# Patient Record
Sex: FEMALE | Race: WHITE | NOT HISPANIC OR LATINO | Employment: OTHER | URBAN - METROPOLITAN AREA
[De-identification: names, ages, dates, MRNs, and addresses within clinical notes are randomized per-mention and may not be internally consistent; named-entity substitution may affect disease eponyms.]

---

## 2023-08-20 PROBLEM — R26.9 ABNORMALITY OF GAIT: Status: ACTIVE | Noted: 2023-08-20

## 2023-08-20 PROBLEM — K59.00 CONSTIPATION: Status: ACTIVE | Noted: 2023-08-20

## 2023-08-20 PROBLEM — R29.898 LEG WEAKNESS: Status: ACTIVE | Noted: 2023-08-20

## 2023-08-20 PROBLEM — E78.5 HLD (HYPERLIPIDEMIA): Status: ACTIVE | Noted: 2023-08-20

## 2023-08-20 PROBLEM — I10 HTN (HYPERTENSION): Status: ACTIVE | Noted: 2023-08-20

## 2023-08-20 PROBLEM — S32.010A COMPRESSION FRACTURE OF L1 VERTEBRA (MULTI): Status: ACTIVE | Noted: 2023-08-20

## 2023-08-20 PROBLEM — M54.50 LOWER BACK PAIN: Status: ACTIVE | Noted: 2023-08-20

## 2023-08-20 PROBLEM — M25.69 BACK STIFFNESS: Status: ACTIVE | Noted: 2023-08-20

## 2023-08-20 PROBLEM — G20.A1 PARKINSON DISEASE (MULTI): Status: ACTIVE | Noted: 2023-08-20

## 2023-08-20 PROBLEM — R00.2 PALPITATIONS: Status: ACTIVE | Noted: 2023-08-20

## 2023-08-20 RX ORDER — METHOCARBAMOL 500 MG/1
TABLET, FILM COATED ORAL
COMMUNITY
Start: 2022-08-26

## 2023-08-20 RX ORDER — ATORVASTATIN CALCIUM 20 MG/1
1 TABLET, FILM COATED ORAL NIGHTLY
COMMUNITY

## 2023-08-20 RX ORDER — VENLAFAXINE HYDROCHLORIDE 150 MG/1
CAPSULE, EXTENDED RELEASE ORAL
COMMUNITY

## 2023-08-20 RX ORDER — CARBIDOPA AND LEVODOPA 25; 100 MG/1; MG/1
TABLET ORAL
COMMUNITY
Start: 2017-08-08 | End: 2023-11-10 | Stop reason: SDUPTHER

## 2023-08-20 RX ORDER — VENLAFAXINE HYDROCHLORIDE 75 MG/1
75 CAPSULE, EXTENDED RELEASE ORAL
COMMUNITY
Start: 2021-11-02

## 2023-08-20 RX ORDER — LORAZEPAM 0.5 MG
1 TABLET ORAL DAILY
COMMUNITY
Start: 2021-07-27

## 2023-08-20 RX ORDER — AMLODIPINE BESYLATE 5 MG/1
1 TABLET ORAL DAILY
COMMUNITY

## 2023-08-20 RX ORDER — OXYBUTYNIN CHLORIDE 10 MG/1
1 TABLET, EXTENDED RELEASE ORAL DAILY
COMMUNITY
Start: 2021-04-27

## 2023-08-20 RX ORDER — POLYETHYLENE GLYCOL 3350 17 G/17G
POWDER, FOR SOLUTION ORAL
COMMUNITY

## 2023-08-20 RX ORDER — AMANTADINE HYDROCHLORIDE 100 MG/1
100 TABLET ORAL
COMMUNITY
Start: 2017-12-12 | End: 2024-01-10 | Stop reason: SDUPTHER

## 2023-08-20 RX ORDER — CARBIDOPA AND LEVODOPA 50; 200 MG/1; MG/1
TABLET, EXTENDED RELEASE ORAL
COMMUNITY
Start: 2022-08-03 | End: 2024-01-10 | Stop reason: SDUPTHER

## 2023-08-20 RX ORDER — HYDROCHLOROTHIAZIDE 25 MG/1
TABLET ORAL
COMMUNITY
Start: 2021-11-02

## 2023-08-20 RX ORDER — ACETAMINOPHEN, DEXTROMETHORPHAN HBR, DOXYLAMINE SUCCINATE, PHENYLEPHRINE HCL 650; 20; 12.5; 1 MG/30ML; MG/30ML; MG/30ML; MG/30ML
1 SOLUTION ORAL DAILY
COMMUNITY
Start: 2021-04-27

## 2023-10-02 ENCOUNTER — APPOINTMENT (OUTPATIENT)
Dept: NEUROLOGY | Facility: CLINIC | Age: 78
End: 2023-10-02
Payer: MEDICARE

## 2023-10-02 ENCOUNTER — TELEPHONE (OUTPATIENT)
Dept: NEUROLOGY | Facility: CLINIC | Age: 78
End: 2023-10-02
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
## 2023-10-23 ENCOUNTER — OFFICE VISIT (OUTPATIENT)
Dept: NEUROLOGY | Facility: CLINIC | Age: 78
End: 2023-10-23
Payer: MEDICARE

## 2023-10-23 VITALS
HEIGHT: 67 IN | DIASTOLIC BLOOD PRESSURE: 66 MMHG | HEART RATE: 61 BPM | WEIGHT: 180 LBS | SYSTOLIC BLOOD PRESSURE: 135 MMHG | BODY MASS INDEX: 28.25 KG/M2

## 2023-10-23 DIAGNOSIS — R26.9 ABNORMALITY OF GAIT: ICD-10-CM

## 2023-10-23 DIAGNOSIS — G20.A1 PARKINSON'S DISEASE WITHOUT DYSKINESIA OR FLUCTUATING MANIFESTATIONS (MULTI): Primary | ICD-10-CM

## 2023-10-23 DIAGNOSIS — F41.9 ANXIETY: ICD-10-CM

## 2023-10-23 DIAGNOSIS — R41.89 COGNITIVE IMPAIRMENT: ICD-10-CM

## 2023-10-23 PROCEDURE — 3075F SYST BP GE 130 - 139MM HG: CPT | Performed by: PSYCHIATRY & NEUROLOGY

## 2023-10-23 PROCEDURE — 1159F MED LIST DOCD IN RCRD: CPT | Performed by: PSYCHIATRY & NEUROLOGY

## 2023-10-23 PROCEDURE — 3078F DIAST BP <80 MM HG: CPT | Performed by: PSYCHIATRY & NEUROLOGY

## 2023-10-23 PROCEDURE — 99214 OFFICE O/P EST MOD 30 MIN: CPT | Performed by: PSYCHIATRY & NEUROLOGY

## 2023-10-23 PROCEDURE — 1160F RVW MEDS BY RX/DR IN RCRD: CPT | Performed by: PSYCHIATRY & NEUROLOGY

## 2023-10-23 PROCEDURE — 1036F TOBACCO NON-USER: CPT | Performed by: PSYCHIATRY & NEUROLOGY

## 2023-10-23 ASSESSMENT — UNIFIED PARKINSONS DISEASE RATING SCALE (UPDRS)
CHAIR_RISING_SCALE: 2
MINUTES_SINCE_LEVODOPA: 90 MINS
AMPLITUDE_LIP_JAW: 0
CONSTANCY_TREMOR_ATREST: 1
AMPLITUDE_RUE: 1
RIGIDITY_LUE: 0
LEG_AGILITY_RIGHT: 0
KINETIC_TREMOR_LEFTHAND: 0
LEVODOPA: YES
PRONATION_SUPINATION_RIGHT: 0
PARKINSONS_MEDS: YES
TOETAPPING_LEFT: 3
KINETIC_TREMOR_RIGHTHAND: 0
FINGER_TAPPING_LEFT: 2
POSTURAL_TREMOR_LEFTHAND: 0
PRONATION_SUPINATION_LEFT: 1
CLINICAL_STATE: ON
RIGIDITY_RUE: 1
RIGIDITY_NECK: 1
RIGIDITY_RLE: 0
HANDMOVEMENTS_RIGHT: 1
FREEZING_GAIT: 2
POSTURE: 2
AMPLITUDE_LLE: 1
RIGIDITY_LLE: 0
SPONTANEITY_OF_MOVEMENT: 1
POSTURAL_TREMOR_RIGHTHAND: 0
LEG_AGILITY_LEFT: 0
AMPLITUDE_RLE: 0
AMPLITUDE_LUE: 0
SPEECH: 2
POSTURAL_STABILITY: 0
FINGER_TAPPING_RIGHT: 0
DYSKINESIAS_PRESENT: NO
TOETAPPING_RIGHT: 0
FACIAL_EXPRESSION: 1

## 2023-10-23 ASSESSMENT — PATIENT HEALTH QUESTIONNAIRE - PHQ9
SUM OF ALL RESPONSES TO PHQ9 QUESTIONS 1 AND 2: 0
2. FEELING DOWN, DEPRESSED OR HOPELESS: NOT AT ALL
1. LITTLE INTEREST OR PLEASURE IN DOING THINGS: NOT AT ALL

## 2023-10-23 ASSESSMENT — ENCOUNTER SYMPTOMS
DEPRESSION: 0
LOSS OF SENSATION IN FEET: 0
OCCASIONAL FEELINGS OF UNSTEADINESS: 0

## 2023-10-23 NOTE — PATIENT INSTRUCTIONS
It was nice to see you today     Carbidopa/levodopa 25/100 two tabs four times a day  Carbidopa/levodopa 50/200 CR at bedtime  Amantadine 100 mg twice a day  Neuropsychology testing  Physical therapy for gait and balance  RTC in 6 months.    [de-identified] : Musculoskeletal:. Left knee exam shows no effusion, ROM is 0-1 20 degrees, no instability, there is a well-healed midline surgical incision without evidence of erythema drainage or discharge, there is no tenderness palpation of the proximal fibula there is full range of motion of the ankle without pain, nontender palpation over the medial lateral malleoli, no edema or ecchymosis, foot warm well perfused brisk cap refill\par 5/5 motor strength in bilateral lower extremities. Sensory: Intact in bilateral lower extremities. DTRs: Biceps, brachioradialis, triceps, patellar, ankle and plantar 2+ and symmetric bilaterally. Pulses: dorsalis pedis, posterior tibial, femoral, popliteal, and radial 2+ and symmetric bilaterally. \par Constitutional: Alert and in no acute distress, but well-appearing.  [de-identified] : 2 views of the left tib-fib obtained the office today show no acute fracture or dislocation.  There is evidence of healed left proximal fibular fracture with abundant callus formation increase from prior x-ray.\par \par 3 views left ankle obtained the office today show no acute fracture or dislocation.
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
## 2023-11-10 DIAGNOSIS — G20.A1 PARKINSON'S DISEASE WITHOUT DYSKINESIA OR FLUCTUATING MANIFESTATIONS (MULTI): Primary | ICD-10-CM

## 2023-11-10 RX ORDER — CARBIDOPA AND LEVODOPA 25; 100 MG/1; MG/1
2 TABLET ORAL 4 TIMES DAILY
Qty: 720 TABLET | Refills: 3 | Status: SHIPPED | OUTPATIENT
Start: 2023-11-10 | End: 2024-01-10 | Stop reason: SDUPTHER

## 2024-01-10 DIAGNOSIS — G20.A1 PARKINSON'S DISEASE, UNSPECIFIED WHETHER DYSKINESIA PRESENT, UNSPECIFIED WHETHER MANIFESTATIONS FLUCTUATE (MULTI): ICD-10-CM

## 2024-01-10 DIAGNOSIS — G20.A1 PARKINSON'S DISEASE WITHOUT DYSKINESIA OR FLUCTUATING MANIFESTATIONS (MULTI): ICD-10-CM

## 2024-01-11 RX ORDER — AMANTADINE HYDROCHLORIDE 100 MG/1
100 TABLET ORAL 2 TIMES DAILY
Qty: 180 TABLET | Refills: 3 | Status: SHIPPED | OUTPATIENT
Start: 2024-01-11 | End: 2025-01-10

## 2024-01-11 RX ORDER — CARBIDOPA AND LEVODOPA 25; 100 MG/1; MG/1
2 TABLET ORAL 4 TIMES DAILY
Qty: 720 TABLET | Refills: 3 | Status: SHIPPED | OUTPATIENT
Start: 2024-01-11 | End: 2025-01-10

## 2024-01-11 RX ORDER — CARBIDOPA AND LEVODOPA 50; 200 MG/1; MG/1
1 TABLET, EXTENDED RELEASE ORAL NIGHTLY
Qty: 90 TABLET | Refills: 3 | Status: SHIPPED | OUTPATIENT
Start: 2024-01-11 | End: 2025-01-10

## 2024-02-13 ENCOUNTER — OFFICE VISIT (OUTPATIENT)
Dept: PSYCHOLOGY | Facility: CLINIC | Age: 79
End: 2024-02-13
Payer: MEDICARE

## 2024-02-13 DIAGNOSIS — G20.A1 PARKINSON'S DISEASE WITHOUT DYSKINESIA OR FLUCTUATING MANIFESTATIONS (MULTI): ICD-10-CM

## 2024-02-13 DIAGNOSIS — R41.89 COGNITIVE IMPAIRMENT: ICD-10-CM

## 2024-02-13 PROCEDURE — 99211NT NEUROPYSCH TESTING PENDING FINAL BILLING: Performed by: PSYCHOLOGIST

## 2024-02-13 NOTE — PROGRESS NOTES
Neuropsychology Evaluation    Name: Jennifer Murrell  : 1945  MRN: [id number]  Referring Provider: Sandi Coppola MD    Date of Service: 2024      Reason for Visit: Ms. Murrell was referred for neuropsychological evaluation due to memory difficulty and cognitive complaints.    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 Language and learning/memory were relatively spared, demonstrating adequate consolidation and retrieval (with benefit from cues) of information. Emotionally, she denied any significant depression/anxiety on self-report measures, though she and her son described longstanding anxiety/worry that has been exacerbated by medical conditions and related difficulties.     DIAGNOSTIC IMPRESSIONS: History (which suggests a degree of cognitive impairment that has begun to interfere with the ability to perform IADLs) and present results indicate a dementia (F03.90), with deficits in attention, processing speed, and executive functioning. Etiology is likely multifactorial; this profile is consistent with frontal-subcortical dysfunction, characteristic of her history (with onset of cognitive difficulties following gait/tremor disturbance and visual disturbances) and current profile are most consistent with Parkinson's disease. While some contribution from her cerebrovascular risk factors (e.g., hypertension, hyperlipidemia), potentially uncontrolled  medical conditions due to medication non-adherence, anxiety, and unrestful are likely, these factors alone would not fully account for her current cognitive profile and serve as exacerbating factors that should be monitored more closely. Continued work up and management of Parkinson's and other contributing factors is warranted to optimize her care and well-being.     RECOMMENDATIONS & CONSIDERATIONS:    1.   Ms. Murrell should continue to follow up with Dr. Coppola for ongoing monitoring and treatment planning. Given concerns related to urinary incontinence noted during the current evaluation (which may be due to non-adherence to current medications or side-effects), the patient and her family are encouraged to discuss this with her current providers for more appropriate management.     Relatedly, close monitoring and management of cerebrovascular risk factors is important to reduce their impact on cognition over time. This includes adherence to a heart healthy diet, as medically advised.    2.   In light of cognitive impairment, increased oversight/assistance in IADLs is prudent, especially given concerns with medication noncompliance and personal hygiene. As feasible, her family may double check to ensure she has taken her medications as prescribed; alarms/reminders may also be set to aid in adherence. Checking in to ensure the patient has bathed or double checking finances may also be considered. She should not attend medical appointments alone, to ensure accurate symptom report and proper follow-up of recommendations.     Within the bounds of safety, Ms. Murrell is encouraged to continue engaging in IADLs that are low risk, including cooking light meals and light household chores to maintain her independence and cognitive activity.    3.   Ms. Murrell and her family should discuss future living arrangements that ensure the patient is safe and receives the care needed, especially if her condition progresses. They  family might benefit from a multidisciplinary consultation with our geriatric team at [facility] in [place] ([address]; [phone]), who can provide further input on treatment options, assess patient and family needs, identify resources in the community, and design a comprehensive management plan.    4.   Given the patient's report of increased anxiety (despite minimal symptoms endorsed on face-valid screeners), she may consider consultation with Psychiatry for more targeted management of mood/anxiety in the context of a concurrent neurological disorder (i.e., Parkinson's disease with dementia); Dr. Otto Brandon is highly recommended in this regard.     5.   Ms. Murrell and her family are encouraged to continue using helpful organizational strategies to compensate for cognitive changes (e.g., using reminder notes, pill packs, calendar for appointments). She may also benefit from the following suggestions:             a.  Establish a structured and consistent routine. For instance, meals and bedtime should be at a regular time each day, and frequently used items (e.g., phone, wallet/purse, keys, glasses) should always be kept in - and consistently returned to - designated locations.            b.  Keeping a single list of tasks, listed by priority, and marking them when complete.            c.  Placing reminders in places where they are easily noticed (e.g., a note on door).            d.  Using a mobile phone or electronic calendar to set reminder alerts for tasks and events (e.g., medications, appointments).            e.  Writing down important information (e.g., tasks, conversational details, list items) immediately, to strengthen encoding and for later reference. This will also help to free up cognitive resources to focus on the task at hand.    6.   Leading a physically, socially, and cognitively active lifestyle is important for healthy brain aging. We applaud Ms. Murrell  for engaging in exercise classes and she should continue to do this. She is encouraged to engage in activities she finds enjoyable and meaningful (volunteering at the [employer]), as well as exploring and pursuing new hobbies.     7.   Repeat neuropsychological evaluation in 1-2 years, or as clinically indicated, using present data as baseline, may be helpful in determining if there is further cognitive decline and to characterize Ms. Olivares's needs at that time.      Thank you for the opportunity to participate in Ms. Murrell's evaluation and care. If we can be of further assistance, please do not hesitate to contact us at [phone].      -- EXTENDED REPORT --      History of Presenting Illness: Ms. Murrell is a 78 y.o., right-handed,  female, currently followed by Dr. Coppola for neurological monitoring/management of Parkinson's disease (diagnosed in 2014). MoCA score from 4/4/2023 was 23/30 (-4 visuospatial/executive, -1 attention, -1 delayed recall) but was noted to be confounded by anxiety at that time. Records indicate difficulty attending a virtual PCP appointment on 10/2/2023, with report of cognitive/memory decline noted by the patient's sister. At her most recent follow-up visit with Dr. Coppola on 10/23/2023, episodes of confusion and forgetfulness of medications was noted, prompting referral for neuropsychological evaluation.     RELEVANT LABS/EXAMS:   - No neuroimaging available for review at the time of this evaluation.   - Labs from 10/09/2023 were within normal ranges for TSH, CBC, A1C, glucose, B12, VD, and lipid panel.     CLINICAL INTERVIEW: Ms. Murrell was accompanied by her son whom lives out in [place] and visits her monthly; other family members, including her son who is [facility] and lives nearby was not present for the current evaluation. Within this context, the patient reported forgetfulness of conversations details (need information repeated with some benefit from reminders/cues), names  "of acquaintances (no difficulties with people she knows well, confirmed by her son), and word finding difficulty (slowed response latency; eventually finds the right word)) which was notable throughout the clinical interview. She denied misplacing items and continues to keep her home organized without difficulty. Her son added Ms. Murrell has reduced cognitive processing speed and and difficulty learning new information without significant repetition/clarification. Some confusion regarding how to use her phone was noted but largely attributed to tremor-related difficulties (i.e., selecting the wrong button). Although changes in attention/distractibility, organization, or personality were denied, the patient demonstrated difficulty with impulsivity and disinhibition throughout the evaluation. Ms. Alva noted insidious onset of cognitive difficulties 2-3 years ago, which she attributed to reductions in various responsibilities and relocating 5 years ago.     Instrumental activities of daily living (IADLs):    - Bathing/self-care: Independent; noted she showers daily (though son mentioned he was uncertain if this was accurate, given her appearance/odor and the scent of urine in the patient's home).    - Medication management: Independent, with use of pill packs for the past month; there had been some concern regarding medication mismanagement (e.g., non-adherence due to forgetting; tendency to save forgotten medications \"just in case\"). During this appointment, Ms. Murrell required a reminder to take her carbidopa-levodopa as scheduled.     - Finances / bill paying: Independent, without reported problems/difficulty; she also has a .    - Scheduling / appointments: Sister has assumed responsibility for scheduling medical appointments and transportation for the past 2 years. The patient reported she was managing scheduling/tracking independently without difficulty prior to this. Her sister was also noted " "to schedule other appointments (e.g., hair, nails) which was attributed to the tendency to \"micromanage.\" Ms. Alva tracks appointments with the use of a calendar.    - Driving: Stopped driving in October 2023 following an instance of confusing the brake for the gas when leaving her home; a similar incident happened in the next few days, prompting the patient to voluntarily stop driving. Prior to this, accidents or incidents of getting lost were denied. She relies on family and transportation (typically arranged by her sister) services to attend appointments.   - Cooking / meal preparation: Independent, without reported problems/difficulty or safety concerns.  - Shopping: Independent, without reported problems/difficulty.  - Household tasks: Assistance from a cleaning service 2x/month due to physical limitations. Independent in light household chores (e.g., laundry, dishwashing) without reported problems/difficulty.    Relevant Medical Status & History: Ms. Alva was diagnosed with Parkinson's disease in 2013 following onset of right-handed tremor that progressed to bilateral hand and foot tremor. She was previously followed by Dr. Lorenzo Mcginnis or [facility] until establishing with Dr. Coppola since August 2018. Symptoms are currently well-managed with medications, including improvements in gait and balance and engagement in regular physical activity (2x/week) through [facility] exercise program. She denied difficulty ambulating without assistance or any recent falls. Ms. Murrell notes onset of visual illusions about 5 years ago (described as misperceiving a blanket over a chair for a child) typically occurring at night. Ms. Alva reported urinary incontinence (typically 2-3x/day); although she is prescribed oxybutynin, she noted she does not take this medications consistently due to feeling nauseas. She was informed to take medications with food to improve side-effects at the time of the current evaluation. " "The patient also experiences back pain; MRI of the spine from 08/29/2022 was read as showing \"advanced multilevel degenerative changes of the lumbar spine most prominent at L3-L4 and L4-L5, multilevel subarticular recess narrowing, chronic inferior endplate deformity L1 vertebral body, and diffuse osteopenia.\" She reported engaging in PT for several months last year with symptom improvement. Pain is typically 2-3/10 (with 10 being more severe); pain at the time of the current evaluation was 0/10. She denied any history of head injury/concussion, seizures, or stroke/TIA. Medical history is notable for hypertension and hyperlipidemia, managed with medications.     Sleep: Described as \"sporadic,\" noted falling asleep around 7:30pm (without difficulty initiating sleep) and waking 2-3 times per night due to nocturia. Some instances of difficulty returning back to sleep due to worry/rumination was noted. She typically wakes for her day around 4am, feeling generally well rested. She takes an hour long daytime nap. Vivid dreams, snoring, and other REM sleep behaviors were denied. She has never undergone a sleep study.   Appetite: Changed; noted she does not eat as much since living alone. She typically eats ready-made meals or chicken/salmon. Noted limited fruits and vegetables in her current diet. Noted losing 15lbs in the past two years due to eating less.   Hearing: Denied prior audiology evaluation; her reported the television is very loud in her home. Hearing was adequate during the current evaluation.   Vision: Underwent bilateral cataract surgery 2 years ago; has reading glasses but does not wear them.      Patient Active Problem List   Diagnosis    Abnormality of gait    Back stiffness    Compression fracture of L1 vertebra (CMS/HCC)    Constipation    HLD (hyperlipidemia)    HTN (hypertension)    Leg weakness    Lower back pain    Palpitations    Parkinson disease     Substance Use History: Ms. Murrell denied any " history of problematic drinking or other substance abuse. She currently drinks 1 glass of wine nearly every night (longstanding).     Psychiatric Status & History: Longstanding history of anxiety and worry, though this was noted to increase in the past few years. Specifically, she was noted worrying more about medically-related situations such as the results of her yearly mammogram or the current evaluation. She also worries about her family, daily tasks (e.g., did she turn off the stove, take her medications), and recent reductions in daily functioning (e.g., driving, relying on others). Although she noted feeling more calm with current prescribed medications (Effexor), she continues to experience worry (noted occurring 50% of her week)  which can disrupt her sleep (noted difficulty returning to sleep after awakening due to rumination/worry). The patient denied any significant depressive symptoms, suicidal/homicidal ideation, or paranoid beliefs.     Developmental/Educational/Psychosocial History:    - Early development: Reportedly normal; no known birth complications or developmental delays.   - Academic history: No history of attention difficulties, learning or behavioral problems, special services/academic accommodations, or repeated/skipped grades. Grades were generally “good” (As/Bs with an occasional C).   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 Ms. 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 She stopped engaging in these various activities 5 years ago when she relocated from [place] to [place].    - Social history: Ms. Alva is  ( passed in 2006); she has three adult children and 7  grandchildren.   Hobbies: Reading, exercise classes, cooking, watching television.   Social support: Good, strong social support through her family and classes.     Relevant Family History: Family history is notable for hemochromatosis type 1 (son, sister); otherwise, no known family history of memory or neurocognitive disorders.     Procedures/Tests:  Review of available records; Adult Neuropsychology History Form; Clinical interview with Ms. Murrell and her son conjointly;    Savage Naming Test-Second Edition (BNT-2)  Brief Visuospatial Memory Test-Revised (BVMT-R)  Jennyfer-Prater Executive Function System (D-KEFS) Verbal Fluency  Dot Counting Test (DCT)  Executive Clock Drawing Task (CLOX)  Finger Tapping Test  Generalized Anxiety Disorder-7-item Scale (ROBERT-7)  Geriatric Depression Scale (GDS)  Muñoz Verbal Learning Test-Revised (HVLT-R)  Independent Living Scales (ILS) Health & Safety  Modified Wisconsin Card Sorting Test (MWCST)  Stroop Color and Word Test (Stroop)  Trail Making Test (TMT)  Wechsler Adult Intelligence Scale-Fourth Edition (WAIS-IV) Digit Span  and Coding  subtests  Wechsler Memory Scale-Fourth Edition (WMS-IV) Logical Memory  Wechsler Test of Adult Reading (WTAR)    The purpose of this evaluation was reviewed, as were issues related to confidentiality and options for receiving feedback regarding results. All questions were answered; Ms. Murrell verbalized understanding and agreed to proceed with this evaluation.    Note: All testing was administered by a psychometrist; results were interpreted in the context of the appropriate normative data.     Behavioral Observations/Neurobehavioral Status Exam: Ms. Alva arrived on time. She presented as a fairly well-groomed  female, who appeared her stated age. She was oriented to person, place, and generally to time (off by one date). Gait was slow and unsteady. She exhibited bilateral tremors in hands and feet, which dramatically improved  following medication administration retirement through the evaluation. Hearing and vision were adequate for the evaluation. Conversational speech (normal in volume and prosody) was somewhat slow/effortful and dysfluent at times, secondary to notable word retrieval difficulty; prone to paraphasic errors. Expressed thoughts were generally logical and goal-directed, though some responses were impulsive (i.e., stated she was 69 then quickly self-corrected to her actual age during orientation screening). There was also a degree of reservation when discussing specific medical and cognitive difficulties, including urinary incontinence and medication adherence. Otherwise, there were no apparent difficulty providing details of autobiographical and clinical/medical history, concurred by her son. There was a general minimization of cognitive difficulties during the clinical exam; when the patient was not present, her son expressed greater concern for her well-being, including her incontinence and personal hygiene. Affect was generally flat with masked faces; brightening at times was noted. On testing, she demonstrated impulsive responding on tasks and error-prone, with attempts to self-correct at times. She also required significant clarification and repetition of task instructions, especially on tasks involving a greater cognitive demand (e.g., M-WCST, TMT Part B); source-memory and proactive inference was also apparent throughout. Overall, she was cooperative with evaluation procedures and appeared to give her best effort on tasks.     Results/Data:       Descriptor T-Score Standard Score Z-Score Scaled Score %ile Rank   Very Superior > 70 > 130 > 2.00  > 16 > 98   Superior 63-69 120-129 1.34 to 1.99 14-15 91-97   High Average 57-62 110-119 0.67 to 1.33 12-13 75-90   Average 43-56  -0.66 to 0.66 8-11 25-74   Low Average 37-42 80-89 -1.33 to -0.67 6-7 9-24   Borderline 33-36 75-79 -1.67 to -1.34 5 5-8   Mildly Deficient  30-32 70-74 -2.00 to -1.68 4 2-4   Moderately Deficient 25-29 62-69  -2.53 to -2.01 3 1   Severely Deficient <24 <61 <-2.54 1-2 <1      RESULTS/DATA:   Performance Validity: Stand-alone and embedded measures of performance validity indicated valid responding.     Premorbid Functioning: Based on a measure of sight-reading ability, as well as Ms. Alva educational history and demographic factors, premorbid intellectual ability was estimated to be in the high-average range (estimated FSIQ = 110).    Attention & Working Memory: Span of auditory attention/working memory, measured by repetition of digits, was low average overall, with low average digits forward and in sequence, with a relative strength (average range) on the backwards trial.     Processing Speed & Executive Abilities: Efficiency in matching and reproducing symbols associated with numbers was average. Speeded word reading and color naming were low average; color naming of incongruent color words (e.g., blue ink spelling the word “red”) was average with average range performance when speed was taken into account. Performance on a timed task that requires simultaneous visual scanning, number sequencing, and fine-motor coordination was mildly deficient, with 2 sequencing errors. When the task became more complex, requiring alternating attention to sequencing numbers and letters, she was unable to completing the task in the standardized time allotted due to difficulty maintaining cognitive-set completing 15 (out of 23) sequences; significant prompt and reminders of task instructions were necessary throughout the task.     On a concept-formation and problem-solving task that involves matching cards by shared fundamental attributes and using hypothesis testing (i.e., trial and error) to inform responses, Ms. Murrell had significant difficulty utilizing feedback to adapt her approach, stating she was unsure about the rules with her overall performance in the  moderately deficient range; she completed 2 (out of 6) sorting categories with an elevated number of perseverative errors (61%, mildly deficient).     Learning & Memory: Learning and recall of a 12-item word list over three trials was average, with modest benefit from repetition (trial scores: 7, 10, 9). After a 20-minute delay, recall was average (90% savings), with 9 list words recalled. Recognition was low average (recognized 10 of 10 list words initially learned), with 2 false positive errors.     Immediate recall of logically organized semantic information in the form of two stories was low average. After a 20-minute delay, recall was average, without needing semantic cues; however, she recalled both stories on initial and recall trials despite being told to tell one at a time. Delayed recognition of story detail was at least low average.     Immediate recall of an array of geometric designs was low average; 20-minute delayed recall was low average; recognition was at low average-average.     Language Functioning: Naming of common objects was in the average range, with benefit from phonemic cues/multiple-choice format. Letter fluency (i.e., rapid generation of words beginning with a given letter over three one-minute trials) was low average when compared to same-aged peers and borderline when also accounting for education level. Category fluency (i.e., rapid generation of words in a specific category) was low average compared to same-aged peers (moderately deficient when also accounting for education level). Category fluency while switching back and forth between two categories was moderately deficient, with severely deficient switching accuracy. Total number of set-loss errors across verbal fluency tasks was severely deficient (perseverations were within normal limits). There was also evidence of potential source-memory/proactive interference such that prior trial instructions were present on new tasks.      Visuospatial Functioning: Ability to copy a series of two-dimensional geometric designs of varying complexity was at least mildly deficient due to inattention to details. Discrimination of lines was moderately deficient and notable for difficulty maintaining task instructions across the measure. Spontaneous generation of a clock drawing was low average, with the major of clock components (e.g., circular, hands, numbers) though spacing and correct time was not accurate. Copy of a clock drawing was average.     Motor Functioning: Fine motor speed, as measured by finger tapping, was moderately deficient for her dominant (right) hand and severely deficient for the non-dominant (left) hand.       Daily Functioning: On a task assessing the patient's ability to respond appropriately everyday health and safety situations, she demonstrated understanding of basic responses to emergency situations (e.g., call 911, look both ways before crossing the street), but had difficulty understanding consequences of more complex situations. For instance, she noted she would celebrate if she unintentionally lost 10lbs. Her overall score was in the low average range.     Emotional Functioning: On face-valid self-report inventories assessing mood and emotional status, responses did not indicate clinically meaningful symptoms of anxiety or depression.       Electronically signed by Beatriz Hunt PsyD at 3/14/2024    4:09 PM      Attestation: I saw and evaluated the patient. I personally obtained the key and critical portions of the neuropsychological exam or was physically present for key and critical portions performed by the fellow. I consulted with the fellow, reviewed/revised all documentation, and agree with the diagnostic impressions and neuropsychological findings.        Time-based service: 39290 (60 minutes); 47203 (32 minutes); 10800 (60 minutes); 85304 (142 minutes); 63073 (30 minutes); 52312 (231 minutes)    No

## 2024-02-19 ENCOUNTER — APPOINTMENT (OUTPATIENT)
Dept: NEUROLOGY | Facility: CLINIC | Age: 79
End: 2024-02-19
Payer: MEDICARE

## 2024-02-22 ENCOUNTER — APPOINTMENT (OUTPATIENT)
Dept: PSYCHOLOGY | Facility: CLINIC | Age: 79
End: 2024-02-22
Payer: MEDICARE

## 2024-03-04 ENCOUNTER — TELEMEDICINE CLINICAL SUPPORT (OUTPATIENT)
Dept: PSYCHOLOGY | Facility: CLINIC | Age: 79
End: 2024-03-04
Payer: MEDICARE

## 2024-03-04 DIAGNOSIS — F41.9 ANXIETY: ICD-10-CM

## 2024-03-04 DIAGNOSIS — R41.3 MEMORY DIFFICULTY: ICD-10-CM

## 2024-03-04 DIAGNOSIS — F03.A4 MILD DEMENTIA WITH ANXIETY, UNSPECIFIED DEMENTIA TYPE (MULTI): ICD-10-CM

## 2024-03-04 DIAGNOSIS — R41.89 COGNITIVE DECLINE: Primary | ICD-10-CM

## 2024-03-04 PROCEDURE — 96132 NRPSYC TST EVAL PHYS/QHP 1ST: CPT | Performed by: PSYCHOLOGIST

## 2024-03-04 PROCEDURE — 96121 NUBHVL XM PHY/QHP EA ADDL HR: CPT | Performed by: PSYCHOLOGIST

## 2024-03-04 PROCEDURE — 96138 PSYCL/NRPSYC TECH 1ST: CPT | Performed by: PSYCHOLOGIST

## 2024-03-04 PROCEDURE — 96139 PSYCL/NRPSYC TST TECH EA: CPT | Performed by: PSYCHOLOGIST

## 2024-03-04 PROCEDURE — 96116 NUBHVL XM PHYS/QHP 1ST HR: CPT | Performed by: PSYCHOLOGIST

## 2024-03-04 PROCEDURE — 96133 NRPSYC TST EVAL PHYS/QHP EA: CPT | Performed by: PSYCHOLOGIST

## 2024-03-04 NOTE — PROGRESS NOTES
Neuropsychology Note    Name: Jennifer Murrell  : 1945  MRN: [id number]      Date of Service: 2024     Reason For Visit: Ms. Murrell underwent neuropsychological evaluation on 2024, due to memory difficulty and concern regarding cognitive decline. The full report can be found in the note for that visit.     This was a telehealth appointment for feedback regarding evaluation results, held via an interactive audio and video telecommunication system that permits real-time communications between patient (at home) and provider (in office).     Summary: Ms. Murrell was seen today to discuss findings from her neuropsychological evaluation on 2024; she was accompanied by her  daughter-in-law .    Results were reviewed and explained, with a thorough discussion of clinical impressions, including diagnosis of dementia.     Psychoeducation was provided; recommendations were discussed, and questions were addressed.      Ms. Murrell was appropriately engaged in the appointment. She and her  daughter-in-law  verbalized understanding of findings, impressions, and recommendations.          Time-based service:    - Evaluation: 75540 (60 minutes); 65754 (32 minutes); 86946 (60 minutes); 18871 (142 minutes); 01871 (30 minutes); 01900 (231 minutes)   - Feedback via telehealth: 48819 (51 minutes)

## 2024-04-15 ENCOUNTER — TELEMEDICINE (OUTPATIENT)
Dept: NEUROLOGY | Facility: CLINIC | Age: 79
End: 2024-04-15
Payer: MEDICARE

## 2024-04-15 DIAGNOSIS — R25.1 TREMOR: ICD-10-CM

## 2024-04-15 DIAGNOSIS — R41.89 COGNITIVE DECLINE: Primary | ICD-10-CM

## 2024-04-15 DIAGNOSIS — G20.A1 PARKINSON'S DISEASE WITHOUT DYSKINESIA OR FLUCTUATING MANIFESTATIONS (MULTI): ICD-10-CM

## 2024-04-15 PROCEDURE — 1036F TOBACCO NON-USER: CPT | Performed by: PSYCHIATRY & NEUROLOGY

## 2024-04-15 PROCEDURE — 99213 OFFICE O/P EST LOW 20 MIN: CPT | Performed by: PSYCHIATRY & NEUROLOGY

## 2024-04-15 PROCEDURE — 1159F MED LIST DOCD IN RCRD: CPT | Performed by: PSYCHIATRY & NEUROLOGY

## 2024-04-15 NOTE — PROGRESS NOTES
Subjective     Jennifer Murrell is a right handed  78 y.o. year old female who presents with No chief complaint on file..  Visit type: follow up visit     HPI  She is seen for virtual follow up. She feels that she is doing well.     Review of motor symptoms:  Tremor:  Location-both hands.                   With rest, and also when writing or opening small packaging. If she is anxious the tremors get worse.   Stiffness/rigidity: denies  Slowness: yes.   Trouble walking: some wobbliness when first gets up. She does shuffle at times.   Freezing of gait: this occurs w change of direction, not so much, seems manageable most of times.   Balance problems: yes. Feels that changing.   Falls: has fallen two times since last visit- one was backing up in laundry room, foot got caught on broom, one cannot recall, no injury from either, fell on bottom each times.   Changes in speech: denies  Swallowing difficulties:denies  Handwriting: shaky.   Activities of daily living (buttoning clothes, bathing, cutting food, etc): independent. Lives alone she does shopping, Stopped driving.   Gets lift from family members to grocery store. She manages her own finances Has somebody that takes care of the estate and taxes. Her sons are very attentive    Review of nonmotor symptoms:  Cognition:  Memory-feels that pretty good.          Hallucinations- no VH.          Obsessive/compulsive behaviors- feels that she is a little obsessive, has asked questions many times.   Mood: gets anxious about certain things. No depression.   Sleep disturbances including REM behavior disorder: trouble staying asleep, sometimes sees someone sitting on chair when she first wakes up. No RBD   Constipation: miralax controls it  Urinary retention or frequency: denies  Dizziness upon standing: occasionally if stands up to quickly.   Excessive saliva/drooling: denies, unless asleep.   Fatigue: denies     Goes to  [employer] classes at least 3 hours per week.  "    MoCA April 2023 was 23/30   Neuropsych testing February 2024:  \"In the context of presumed high average premorbid intellectual functioning, present results demonstrated below expected performances on measures of basic auditory attention, processing speed (motor and cognitive), visuospatial perception/organization, and nearly all aspects of executive functioning (e.g., problem-solving, set-shifting, cognitive flexibility, maintaining cognitive set); qualitative evidence of impulsivity, difficulty following complex tasks/instructions, and proactive interference evident across most tasks. Language and learning/memory were relatively spared, demonstrating adequate consolidation and retrieval (with benefit from cues) on information .\"  ...\"Taken together, the current cognitive profile, including deficits in attention, processing speed, and executive function, combined with reported declines in ability to perform IADLs (not due to physical difficulties) are consistent with a dementia level of decline (major neurocognitive disorder F03.90). Etiologically, her history (with onset of cognitive difficulties following gait/tremor disturbance and visual disturbances) and current profile are most consistent with Parkinson's disease. While some contribution from her cerebrovascular risk factors (e.g., hypertension, hyperlipidemia), \"...    Current meds:  Carbidopa-levodopa 25/100mg 2 tabs at [id number]  Carbidopa-levodopa CR 50/200 mg 1 tab qhs   Amantadine 100 mg bid 8am and noon    When she wears off, she gets a feeling of hyperventilation and she feels shaky.   Meds kick in ~20 mins,   wearing off ~ 4 hours or more.       Mild dyskinesias of the trunk.       Sometimes misses a dose,if she is out and about.                   Review of Systems  All other system have been reviewed and are negative for complaint.  Objective   Virtual appointment.     Neurological Exam    Movement disorder specific examination:  Limited " normal... virtual exam.   Face symmetric, speech fluent.   Moves all extremities symmetrically.                              Assessment/Plan   There are no diagnoses linked to this encounter.    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 She arrives for follow up. Main issues appear to be related to cognitive concerns amongst family members, as well as some FOG with turns. Had MoCA done in office in April 2023- scored 23/30, however recent neuropsych testing showed more concerning findings.     Plan:    Carbidopa/levodopa 25/100 two tabs four times a day  Carbidopa/levodopa 50/200 CR at bedtime  Amantadine 100 mg twice a day  B12, folate and TSH check  MRI brain w/o contrast given degree of cognitive concerns on Neuropsych placing her in Dementia category  RTC in 6 months.

## 2024-04-29 ENCOUNTER — APPOINTMENT (OUTPATIENT)
Dept: NEUROLOGY | Facility: CLINIC | Age: 79
End: 2024-04-29
Payer: MEDICARE

## 2024-05-14 ENCOUNTER — HOSPITAL ENCOUNTER (OUTPATIENT)
Dept: RADIOLOGY | Facility: CLINIC | Age: 79
Discharge: HOME | End: 2024-05-14
Payer: MEDICARE

## 2024-05-14 DIAGNOSIS — R41.89 COGNITIVE DECLINE: ICD-10-CM

## 2024-05-14 PROCEDURE — 70551 MRI BRAIN STEM W/O DYE: CPT | Performed by: RADIOLOGY

## 2024-05-14 PROCEDURE — 70551 MRI BRAIN STEM W/O DYE: CPT

## 2024-05-15 ENCOUNTER — TELEPHONE (OUTPATIENT)
Dept: NEUROLOGY | Facility: HOSPITAL | Age: 79
End: 2024-05-15
Payer: MEDICARE

## 2024-05-17 ENCOUNTER — TELEPHONE (OUTPATIENT)
Dept: NEUROLOGY | Facility: CLINIC | Age: 79
End: 2024-05-17
Payer: MEDICARE

## 2024-06-24 ENCOUNTER — TELEPHONE (OUTPATIENT)
Dept: NEUROLOGY | Facility: CLINIC | Age: 79
End: 2024-06-24
Payer: MEDICARE

## 2024-06-24 NOTE — TELEPHONE ENCOUNTER
Jennifer Russell's son called. They are going on vacation with mom this July and would like to request a handicapped placard. Please mail to [phone] [facility] [id number]'

## 2024-06-27 DIAGNOSIS — G20.A1 PARKINSON'S DISEASE, UNSPECIFIED WHETHER DYSKINESIA PRESENT, UNSPECIFIED WHETHER MANIFESTATIONS FLUCTUATE (MULTI): Primary | ICD-10-CM

## 2024-08-16 ENCOUNTER — TELEPHONE (OUTPATIENT)
Dept: NEUROLOGY | Facility: CLINIC | Age: 79
End: 2024-08-16
Payer: MEDICARE

## 2024-08-16 NOTE — TELEPHONE ENCOUNTER
----- Message from Wesley RIVERA sent at 8/16/2024 12:38 PM EDT -----  Son called stating mom is becoming more forgetful... Not taking medication like she should / was wondering if medication can be consolidated ... She is taking 6xs a day currently- Would like a call back   [place] [phone]

## 2024-08-16 NOTE — TELEPHONE ENCOUNTER
I spoke with patient's son    End of July was doing OK while on vacation with family and taking meds on time, fell a few times trying to get from walker to bed, was taking meds OK but now when back home she gets more confused in the evening, asking when sons are coming over though no one was supposed to come over, thinking she made a dentist apt and did not, Tupperware into oven  She lives alone, taking off life alert, cannot remember to put it back on. Easily confused. Doing her own meds.   Finding medicine in drawers or lying around. Forgetting why she called son over for a fall, then had him come and said it happened a few days ago, she did not know why he was there, the time line when things happen is off.   Does have dyskinesia/swaying.     PLAN  Rytary would be a good option (less frequent dosing, may help dyskinesia) to try, however, the swtich while alone could be dangerous, especially if confused on how to take meds    Discussed if she has sudden worsening in mental status would recommend workup with PCP for acute issues like infection/UTI    Her memory is concerning and does not sound safe for her to be alone/do meds and agree with AL and getting someone in the home in the meantime    They will f/u with me in Sept

## 2024-08-31 ENCOUNTER — APPOINTMENT (OUTPATIENT)
Dept: CARDIOLOGY | Facility: HOSPITAL | Age: 79
DRG: 698 | End: 2024-08-31
Payer: MEDICARE

## 2024-08-31 ENCOUNTER — APPOINTMENT (OUTPATIENT)
Dept: RADIOLOGY | Facility: HOSPITAL | Age: 79
DRG: 698 | End: 2024-08-31
Payer: MEDICARE

## 2024-08-31 ENCOUNTER — HOSPITAL ENCOUNTER (INPATIENT)
Facility: HOSPITAL | Age: 79
DRG: 698 | End: 2024-08-31
Attending: EMERGENCY MEDICINE | Admitting: INTERNAL MEDICINE
Payer: MEDICARE

## 2024-08-31 DIAGNOSIS — T83.511A URINARY TRACT INFECTION ASSOCIATED WITH CATHETERIZATION OF URINARY TRACT, UNSPECIFIED INDWELLING URINARY CATHETER TYPE, INITIAL ENCOUNTER (CMS-HCC): Primary | ICD-10-CM

## 2024-08-31 DIAGNOSIS — N39.0 URINARY TRACT INFECTION ASSOCIATED WITH CATHETERIZATION OF URINARY TRACT, UNSPECIFIED INDWELLING URINARY CATHETER TYPE, INITIAL ENCOUNTER (CMS-HCC): Primary | ICD-10-CM

## 2024-08-31 DIAGNOSIS — N17.9 AKI (ACUTE KIDNEY INJURY) (CMS-HCC): ICD-10-CM

## 2024-08-31 DIAGNOSIS — E86.0 DEHYDRATION: ICD-10-CM

## 2024-08-31 DIAGNOSIS — G93.41 ACUTE METABOLIC ENCEPHALOPATHY: ICD-10-CM

## 2024-08-31 LAB
ALBUMIN SERPL BCP-MCNC: 4 G/DL (ref 3.4–5)
ALP SERPL-CCNC: 76 U/L (ref 33–136)
ALT SERPL W P-5'-P-CCNC: <3 U/L (ref 7–45)
ANION GAP SERPL CALC-SCNC: 17 MMOL/L (ref 10–20)
AST SERPL W P-5'-P-CCNC: 5 U/L (ref 9–39)
BASOPHILS # BLD AUTO: 0.05 X10*3/UL (ref 0–0.1)
BASOPHILS NFR BLD AUTO: 0.7 %
BILIRUB SERPL-MCNC: 0.7 MG/DL (ref 0–1.2)
BUN SERPL-MCNC: 28 MG/DL (ref 6–23)
CALCIUM SERPL-MCNC: 9.3 MG/DL (ref 8.6–10.3)
CHLORIDE SERPL-SCNC: 103 MMOL/L (ref 98–107)
CO2 SERPL-SCNC: 19 MMOL/L (ref 21–32)
CREAT SERPL-MCNC: 1.36 MG/DL (ref 0.5–1.05)
EGFRCR SERPLBLD CKD-EPI 2021: 40 ML/MIN/1.73M*2
EOSINOPHIL # BLD AUTO: 0.03 X10*3/UL (ref 0–0.4)
EOSINOPHIL NFR BLD AUTO: 0.4 %
ERYTHROCYTE [DISTWIDTH] IN BLOOD BY AUTOMATED COUNT: 11.9 % (ref 11.5–14.5)
GLUCOSE BLD MANUAL STRIP-MCNC: 118 MG/DL (ref 74–99)
GLUCOSE SERPL-MCNC: 113 MG/DL (ref 74–99)
HCT VFR BLD AUTO: 36.4 % (ref 36–46)
HGB BLD-MCNC: 12.3 G/DL (ref 12–16)
IMM GRANULOCYTES # BLD AUTO: 0.03 X10*3/UL (ref 0–0.5)
IMM GRANULOCYTES NFR BLD AUTO: 0.4 % (ref 0–0.9)
LYMPHOCYTES # BLD AUTO: 1.17 X10*3/UL (ref 0.8–3)
LYMPHOCYTES NFR BLD AUTO: 16 %
MCH RBC QN AUTO: 31.7 PG (ref 26–34)
MCHC RBC AUTO-ENTMCNC: 33.8 G/DL (ref 32–36)
MCV RBC AUTO: 94 FL (ref 80–100)
MONOCYTES # BLD AUTO: 0.64 X10*3/UL (ref 0.05–0.8)
MONOCYTES NFR BLD AUTO: 8.7 %
NEUTROPHILS # BLD AUTO: 5.41 X10*3/UL (ref 1.6–5.5)
NEUTROPHILS NFR BLD AUTO: 73.8 %
NRBC BLD-RTO: 0 /100 WBCS (ref 0–0)
PLATELET # BLD AUTO: 216 X10*3/UL (ref 150–450)
POTASSIUM SERPL-SCNC: 3.5 MMOL/L (ref 3.5–5.3)
PROT SERPL-MCNC: 6.5 G/DL (ref 6.4–8.2)
RBC # BLD AUTO: 3.88 X10*6/UL (ref 4–5.2)
SODIUM SERPL-SCNC: 135 MMOL/L (ref 136–145)
WBC # BLD AUTO: 7.3 X10*3/UL (ref 4.4–11.3)

## 2024-08-31 PROCEDURE — 99285 EMERGENCY DEPT VISIT HI MDM: CPT

## 2024-08-31 PROCEDURE — 80053 COMPREHEN METABOLIC PANEL: CPT | Performed by: EMERGENCY MEDICINE

## 2024-08-31 PROCEDURE — 82947 ASSAY GLUCOSE BLOOD QUANT: CPT

## 2024-08-31 PROCEDURE — 71045 X-RAY EXAM CHEST 1 VIEW: CPT | Performed by: STUDENT IN AN ORGANIZED HEALTH CARE EDUCATION/TRAINING PROGRAM

## 2024-08-31 PROCEDURE — 93005 ELECTROCARDIOGRAM TRACING: CPT

## 2024-08-31 PROCEDURE — 36415 COLL VENOUS BLD VENIPUNCTURE: CPT | Performed by: EMERGENCY MEDICINE

## 2024-08-31 PROCEDURE — 85025 COMPLETE CBC W/AUTO DIFF WBC: CPT | Performed by: EMERGENCY MEDICINE

## 2024-08-31 PROCEDURE — 99285 EMERGENCY DEPT VISIT HI MDM: CPT | Performed by: EMERGENCY MEDICINE

## 2024-08-31 PROCEDURE — 71045 X-RAY EXAM CHEST 1 VIEW: CPT

## 2024-08-31 ASSESSMENT — COLUMBIA-SUICIDE SEVERITY RATING SCALE - C-SSRS
2. HAVE YOU ACTUALLY HAD ANY THOUGHTS OF KILLING YOURSELF?: NO
6. HAVE YOU EVER DONE ANYTHING, STARTED TO DO ANYTHING, OR PREPARED TO DO ANYTHING TO END YOUR LIFE?: NO
1. IN THE PAST MONTH, HAVE YOU WISHED YOU WERE DEAD OR WISHED YOU COULD GO TO SLEEP AND NOT WAKE UP?: NO

## 2024-08-31 ASSESSMENT — LIFESTYLE VARIABLES
HAVE PEOPLE ANNOYED YOU BY CRITICIZING YOUR DRINKING: NO
EVER FELT BAD OR GUILTY ABOUT YOUR DRINKING: NO
TOTAL SCORE: 0
HAVE YOU EVER FELT YOU SHOULD CUT DOWN ON YOUR DRINKING: NO
EVER HAD A DRINK FIRST THING IN THE MORNING TO STEADY YOUR NERVES TO GET RID OF A HANGOVER: NO

## 2024-08-31 ASSESSMENT — PAIN SCALES - GENERAL: PAINLEVEL_OUTOF10: 0 - NO PAIN

## 2024-08-31 ASSESSMENT — PAIN - FUNCTIONAL ASSESSMENT: PAIN_FUNCTIONAL_ASSESSMENT: 0-10

## 2024-09-01 ENCOUNTER — APPOINTMENT (OUTPATIENT)
Dept: RADIOLOGY | Facility: HOSPITAL | Age: 79
DRG: 698 | End: 2024-09-01
Payer: MEDICARE

## 2024-09-01 VITALS
RESPIRATION RATE: 22 BRPM | WEIGHT: 149.1 LBS | HEIGHT: 67 IN | OXYGEN SATURATION: 98 % | HEART RATE: 60 BPM | SYSTOLIC BLOOD PRESSURE: 145 MMHG | DIASTOLIC BLOOD PRESSURE: 75 MMHG | BODY MASS INDEX: 23.4 KG/M2 | TEMPERATURE: 97.5 F

## 2024-09-01 PROBLEM — G93.41 ACUTE METABOLIC ENCEPHALOPATHY: Status: ACTIVE | Noted: 2024-09-01

## 2024-09-01 LAB
ALBUMIN SERPL BCP-MCNC: 3.6 G/DL (ref 3.4–5)
ALP SERPL-CCNC: 69 U/L (ref 33–136)
ALT SERPL W P-5'-P-CCNC: <3 U/L (ref 7–45)
ANION GAP SERPL CALC-SCNC: 12 MMOL/L (ref 10–20)
APPEARANCE UR: ABNORMAL
AST SERPL W P-5'-P-CCNC: 5 U/L (ref 9–39)
BACTERIA #/AREA URNS AUTO: ABNORMAL /HPF
BILIRUB SERPL-MCNC: 0.5 MG/DL (ref 0–1.2)
BILIRUB UR STRIP.AUTO-MCNC: NEGATIVE MG/DL
BUN SERPL-MCNC: 24 MG/DL (ref 6–23)
CALCIUM SERPL-MCNC: 8.6 MG/DL (ref 8.6–10.3)
CHLORIDE SERPL-SCNC: 107 MMOL/L (ref 98–107)
CO2 SERPL-SCNC: 25 MMOL/L (ref 21–32)
COLOR UR: YELLOW
CREAT SERPL-MCNC: 1.07 MG/DL (ref 0.5–1.05)
EGFRCR SERPLBLD CKD-EPI 2021: 54 ML/MIN/1.73M*2
ERYTHROCYTE [DISTWIDTH] IN BLOOD BY AUTOMATED COUNT: 11.9 % (ref 11.5–14.5)
GLUCOSE SERPL-MCNC: 94 MG/DL (ref 74–99)
GLUCOSE UR STRIP.AUTO-MCNC: NORMAL MG/DL
HCT VFR BLD AUTO: 34.5 % (ref 36–46)
HGB BLD-MCNC: 11.2 G/DL (ref 12–16)
HOLD SPECIMEN: NORMAL
HYALINE CASTS #/AREA URNS AUTO: ABNORMAL /LPF
KETONES UR STRIP.AUTO-MCNC: ABNORMAL MG/DL
LEUKOCYTE ESTERASE UR QL STRIP.AUTO: ABNORMAL
MCH RBC QN AUTO: 31.3 PG (ref 26–34)
MCHC RBC AUTO-ENTMCNC: 32.5 G/DL (ref 32–36)
MCV RBC AUTO: 96 FL (ref 80–100)
MUCOUS THREADS #/AREA URNS AUTO: ABNORMAL /LPF
NITRITE UR QL STRIP.AUTO: NEGATIVE
NRBC BLD-RTO: 0 /100 WBCS (ref 0–0)
PH UR STRIP.AUTO: 6.5 [PH]
PLATELET # BLD AUTO: 183 X10*3/UL (ref 150–450)
POTASSIUM SERPL-SCNC: 3.4 MMOL/L (ref 3.5–5.3)
PROT SERPL-MCNC: 5.8 G/DL (ref 6.4–8.2)
PROT UR STRIP.AUTO-MCNC: ABNORMAL MG/DL
RBC # BLD AUTO: 3.58 X10*6/UL (ref 4–5.2)
RBC # UR STRIP.AUTO: NEGATIVE /UL
RBC #/AREA URNS AUTO: ABNORMAL /HPF
SODIUM SERPL-SCNC: 141 MMOL/L (ref 136–145)
SP GR UR STRIP.AUTO: 1.01
SQUAMOUS #/AREA URNS AUTO: ABNORMAL /HPF
UROBILINOGEN UR STRIP.AUTO-MCNC: NORMAL MG/DL
WBC # BLD AUTO: 6.7 X10*3/UL (ref 4.4–11.3)
WBC #/AREA URNS AUTO: ABNORMAL /HPF

## 2024-09-01 PROCEDURE — 99232 SBSQ HOSP IP/OBS MODERATE 35: CPT | Performed by: INTERNAL MEDICINE

## 2024-09-01 PROCEDURE — 99222 1ST HOSP IP/OBS MODERATE 55: CPT | Performed by: PSYCHIATRY & NEUROLOGY

## 2024-09-01 PROCEDURE — 2500000004 HC RX 250 GENERAL PHARMACY W/ HCPCS (ALT 636 FOR OP/ED): Performed by: EMERGENCY MEDICINE

## 2024-09-01 PROCEDURE — 85027 COMPLETE CBC AUTOMATED: CPT | Performed by: INTERNAL MEDICINE

## 2024-09-01 PROCEDURE — 2500000002 HC RX 250 W HCPCS SELF ADMINISTERED DRUGS (ALT 637 FOR MEDICARE OP, ALT 636 FOR OP/ED): Performed by: PSYCHIATRY & NEUROLOGY

## 2024-09-01 PROCEDURE — 2500000001 HC RX 250 WO HCPCS SELF ADMINISTERED DRUGS (ALT 637 FOR MEDICARE OP): Performed by: INTERNAL MEDICINE

## 2024-09-01 PROCEDURE — 2500000004 HC RX 250 GENERAL PHARMACY W/ HCPCS (ALT 636 FOR OP/ED): Performed by: INTERNAL MEDICINE

## 2024-09-01 PROCEDURE — 81003 URINALYSIS AUTO W/O SCOPE: CPT | Performed by: EMERGENCY MEDICINE

## 2024-09-01 PROCEDURE — 70450 CT HEAD/BRAIN W/O DYE: CPT

## 2024-09-01 PROCEDURE — 80053 COMPREHEN METABOLIC PANEL: CPT | Performed by: INTERNAL MEDICINE

## 2024-09-01 PROCEDURE — 96361 HYDRATE IV INFUSION ADD-ON: CPT

## 2024-09-01 PROCEDURE — 1100000001 HC PRIVATE ROOM DAILY

## 2024-09-01 PROCEDURE — 87086 URINE CULTURE/COLONY COUNT: CPT | Mod: STJLAB | Performed by: EMERGENCY MEDICINE

## 2024-09-01 PROCEDURE — 99223 1ST HOSP IP/OBS HIGH 75: CPT | Performed by: INTERNAL MEDICINE

## 2024-09-01 PROCEDURE — 36415 COLL VENOUS BLD VENIPUNCTURE: CPT | Performed by: INTERNAL MEDICINE

## 2024-09-01 PROCEDURE — 70450 CT HEAD/BRAIN W/O DYE: CPT | Performed by: RADIOLOGY

## 2024-09-01 PROCEDURE — 96365 THER/PROPH/DIAG IV INF INIT: CPT

## 2024-09-01 RX ORDER — TALC
3 POWDER (GRAM) TOPICAL NIGHTLY PRN
Status: DISCONTINUED | OUTPATIENT
Start: 2024-09-01 | End: 2024-09-04 | Stop reason: HOSPADM

## 2024-09-01 RX ORDER — POTASSIUM CHLORIDE 1.5 G/1.58G
20 POWDER, FOR SOLUTION ORAL ONCE
Status: COMPLETED | OUTPATIENT
Start: 2024-09-01 | End: 2024-09-01

## 2024-09-01 RX ORDER — ONDANSETRON HYDROCHLORIDE 2 MG/ML
4 INJECTION, SOLUTION INTRAVENOUS EVERY 8 HOURS PRN
Status: DISCONTINUED | OUTPATIENT
Start: 2024-09-01 | End: 2024-09-04 | Stop reason: HOSPADM

## 2024-09-01 RX ORDER — PANTOPRAZOLE SODIUM 40 MG/10ML
40 INJECTION, POWDER, LYOPHILIZED, FOR SOLUTION INTRAVENOUS
Status: DISCONTINUED | OUTPATIENT
Start: 2024-09-01 | End: 2024-09-04 | Stop reason: HOSPADM

## 2024-09-01 RX ORDER — VENLAFAXINE HYDROCHLORIDE 75 MG/1
150 CAPSULE, EXTENDED RELEASE ORAL
Status: DISCONTINUED | OUTPATIENT
Start: 2024-09-02 | End: 2024-09-04 | Stop reason: HOSPADM

## 2024-09-01 RX ORDER — CARBIDOPA AND LEVODOPA 25; 100 MG/1; MG/1
2 TABLET ORAL 4 TIMES DAILY
Status: DISCONTINUED | OUTPATIENT
Start: 2024-09-01 | End: 2024-09-04 | Stop reason: HOSPADM

## 2024-09-01 RX ORDER — POLYETHYLENE GLYCOL 3350 17 G/17G
17 POWDER, FOR SOLUTION ORAL DAILY
Status: DISCONTINUED | OUTPATIENT
Start: 2024-09-01 | End: 2024-09-04 | Stop reason: HOSPADM

## 2024-09-01 RX ORDER — ACETAMINOPHEN 160 MG/5ML
650 SOLUTION ORAL EVERY 4 HOURS PRN
Status: DISCONTINUED | OUTPATIENT
Start: 2024-09-01 | End: 2024-09-01

## 2024-09-01 RX ORDER — QUETIAPINE FUMARATE 25 MG/1
25 TABLET, FILM COATED ORAL 2 TIMES DAILY PRN
Status: DISCONTINUED | OUTPATIENT
Start: 2024-09-01 | End: 2024-09-04 | Stop reason: HOSPADM

## 2024-09-01 RX ORDER — QUETIAPINE FUMARATE 25 MG/1
25 TABLET, FILM COATED ORAL NIGHTLY
Status: DISCONTINUED | OUTPATIENT
Start: 2024-09-01 | End: 2024-09-04 | Stop reason: HOSPADM

## 2024-09-01 RX ORDER — CARBIDOPA AND LEVODOPA 25; 100 MG/1; MG/1
2 TABLET, EXTENDED RELEASE ORAL NIGHTLY
Status: DISCONTINUED | OUTPATIENT
Start: 2024-09-01 | End: 2024-09-04 | Stop reason: HOSPADM

## 2024-09-01 RX ORDER — ENOXAPARIN SODIUM 100 MG/ML
40 INJECTION SUBCUTANEOUS EVERY 24 HOURS
Status: DISCONTINUED | OUTPATIENT
Start: 2024-09-01 | End: 2024-09-04 | Stop reason: HOSPADM

## 2024-09-01 RX ORDER — ONDANSETRON 4 MG/1
4 TABLET, ORALLY DISINTEGRATING ORAL EVERY 8 HOURS PRN
Status: DISCONTINUED | OUTPATIENT
Start: 2024-09-01 | End: 2024-09-01

## 2024-09-01 RX ORDER — ACETAMINOPHEN 650 MG/1
650 SUPPOSITORY RECTAL EVERY 4 HOURS PRN
Status: DISCONTINUED | OUTPATIENT
Start: 2024-09-01 | End: 2024-09-01

## 2024-09-01 RX ORDER — CEFTRIAXONE 1 G/50ML
1 INJECTION, SOLUTION INTRAVENOUS ONCE
Status: DISCONTINUED | OUTPATIENT
Start: 2024-09-01 | End: 2024-09-01

## 2024-09-01 RX ORDER — AMANTADINE HYDROCHLORIDE 100 MG/1
100 CAPSULE, GELATIN COATED ORAL 2 TIMES DAILY
Status: DISCONTINUED | OUTPATIENT
Start: 2024-09-01 | End: 2024-09-04 | Stop reason: HOSPADM

## 2024-09-01 RX ORDER — POLYETHYLENE GLYCOL 3350 17 G/17G
17 POWDER, FOR SOLUTION ORAL DAILY PRN
Status: DISCONTINUED | OUTPATIENT
Start: 2024-09-01 | End: 2024-09-04 | Stop reason: HOSPADM

## 2024-09-01 RX ORDER — PANTOPRAZOLE SODIUM 40 MG/1
40 TABLET, DELAYED RELEASE ORAL
Status: DISCONTINUED | OUTPATIENT
Start: 2024-09-01 | End: 2024-09-04 | Stop reason: HOSPADM

## 2024-09-01 RX ORDER — CEFTRIAXONE 1 G/50ML
1 INJECTION, SOLUTION INTRAVENOUS ONCE
Status: COMPLETED | OUTPATIENT
Start: 2024-09-01 | End: 2024-09-01

## 2024-09-01 RX ORDER — CEFTRIAXONE 1 G/50ML
1 INJECTION, SOLUTION INTRAVENOUS EVERY 24 HOURS
Status: DISCONTINUED | OUTPATIENT
Start: 2024-09-02 | End: 2024-09-04 | Stop reason: HOSPADM

## 2024-09-01 RX ORDER — ACETAMINOPHEN 325 MG/1
650 TABLET ORAL EVERY 4 HOURS PRN
Status: DISCONTINUED | OUTPATIENT
Start: 2024-09-01 | End: 2024-09-01

## 2024-09-01 RX ORDER — SODIUM CHLORIDE 9 MG/ML
75 INJECTION, SOLUTION INTRAVENOUS CONTINUOUS
Status: ACTIVE | OUTPATIENT
Start: 2024-09-01 | End: 2024-09-01

## 2024-09-01 RX ADMIN — ENOXAPARIN SODIUM 40 MG: 40 INJECTION SUBCUTANEOUS at 12:12

## 2024-09-01 RX ADMIN — POTASSIUM CHLORIDE 20 MEQ: 1.5 POWDER, FOR SOLUTION ORAL at 08:46

## 2024-09-01 RX ADMIN — CARBIDOPA AND LEVODOPA 2 TABLET: 25; 100 TABLET ORAL at 14:17

## 2024-09-01 RX ADMIN — CARBIDOPA AND LEVODOPA 2 TABLET: 25; 100 TABLET ORAL at 17:13

## 2024-09-01 RX ADMIN — SODIUM CHLORIDE 75 ML/HR: 9 INJECTION, SOLUTION INTRAVENOUS at 04:32

## 2024-09-01 RX ADMIN — QUETIAPINE FUMARATE 25 MG: 25 TABLET ORAL at 20:57

## 2024-09-01 RX ADMIN — SODIUM CHLORIDE, POTASSIUM CHLORIDE, SODIUM LACTATE AND CALCIUM CHLORIDE 1000 ML: 600; 310; 30; 20 INJECTION, SOLUTION INTRAVENOUS at 00:40

## 2024-09-01 RX ADMIN — CARBIDOPA AND LEVODOPA 2 TABLET: 25; 100 TABLET, EXTENDED RELEASE ORAL at 20:58

## 2024-09-01 RX ADMIN — AMANTADINE 100 MG: 100 CAPSULE ORAL at 20:58

## 2024-09-01 RX ADMIN — PANTOPRAZOLE SODIUM 40 MG: 40 INJECTION, POWDER, FOR SOLUTION INTRAVENOUS at 06:11

## 2024-09-01 RX ADMIN — CEFTRIAXONE SODIUM 1 G: 1 INJECTION, SOLUTION INTRAVENOUS at 01:46

## 2024-09-01 RX ADMIN — POLYETHYLENE GLYCOL 3350 17 G: 17 POWDER, FOR SOLUTION ORAL at 14:17

## 2024-09-01 SDOH — SOCIAL STABILITY: SOCIAL INSECURITY: HAS ANYONE EVER THREATENED TO HURT YOUR FAMILY OR YOUR PETS?: NO

## 2024-09-01 SDOH — SOCIAL STABILITY: SOCIAL INSECURITY: DOES ANYONE TRY TO KEEP YOU FROM HAVING/CONTACTING OTHER FRIENDS OR DOING THINGS OUTSIDE YOUR HOME?: NO

## 2024-09-01 SDOH — SOCIAL STABILITY: SOCIAL INSECURITY: HAVE YOU HAD THOUGHTS OF HARMING ANYONE ELSE?: NO

## 2024-09-01 SDOH — SOCIAL STABILITY: SOCIAL INSECURITY: ABUSE: ADULT

## 2024-09-01 SDOH — SOCIAL STABILITY: SOCIAL INSECURITY: DO YOU FEEL UNSAFE GOING BACK TO THE PLACE WHERE YOU ARE LIVING?: NO

## 2024-09-01 SDOH — SOCIAL STABILITY: SOCIAL INSECURITY: ARE YOU OR HAVE YOU BEEN THREATENED OR ABUSED PHYSICALLY, EMOTIONALLY, OR SEXUALLY BY ANYONE?: NO

## 2024-09-01 SDOH — SOCIAL STABILITY: SOCIAL INSECURITY: ARE THERE ANY APPARENT SIGNS OF INJURIES/BEHAVIORS THAT COULD BE RELATED TO ABUSE/NEGLECT?: NO

## 2024-09-01 SDOH — SOCIAL STABILITY: SOCIAL INSECURITY: DO YOU FEEL ANYONE HAS EXPLOITED OR TAKEN ADVANTAGE OF YOU FINANCIALLY OR OF YOUR PERSONAL PROPERTY?: NO

## 2024-09-01 SDOH — SOCIAL STABILITY: SOCIAL INSECURITY: HAVE YOU HAD ANY THOUGHTS OF HARMING ANYONE ELSE?: NO

## 2024-09-01 SDOH — SOCIAL STABILITY: SOCIAL INSECURITY: WERE YOU ABLE TO COMPLETE ALL THE BEHAVIORAL HEALTH SCREENINGS?: YES

## 2024-09-01 ASSESSMENT — COGNITIVE AND FUNCTIONAL STATUS - GENERAL
STANDING UP FROM CHAIR USING ARMS: A LITTLE
DAILY ACTIVITIY SCORE: 21
DAILY ACTIVITIY SCORE: 20
MOVING TO AND FROM BED TO CHAIR: A LITTLE
MOBILITY SCORE: 17
MOBILITY SCORE: 18
TURNING FROM BACK TO SIDE WHILE IN FLAT BAD: A LITTLE
HELP NEEDED FOR BATHING: A LITTLE
CLIMB 3 TO 5 STEPS WITH RAILING: A LITTLE
STANDING UP FROM CHAIR USING ARMS: A LITTLE
WALKING IN HOSPITAL ROOM: A LITTLE
MOBILITY SCORE: 17
PATIENT BASELINE BEDBOUND: NO
MOVING FROM LYING ON BACK TO SITTING ON SIDE OF FLAT BED WITH BEDRAILS: A LITTLE
MOVING FROM LYING ON BACK TO SITTING ON SIDE OF FLAT BED WITH BEDRAILS: A LITTLE
MOVING TO AND FROM BED TO CHAIR: A LITTLE
DRESSING REGULAR LOWER BODY CLOTHING: A LITTLE
WALKING IN HOSPITAL ROOM: A LITTLE
TOILETING: A LITTLE
MOVING FROM LYING ON BACK TO SITTING ON SIDE OF FLAT BED WITH BEDRAILS: A LITTLE
TURNING FROM BACK TO SIDE WHILE IN FLAT BAD: A LITTLE
HELP NEEDED FOR BATHING: A LITTLE
WALKING IN HOSPITAL ROOM: A LITTLE
DAILY ACTIVITIY SCORE: 21
CLIMB 3 TO 5 STEPS WITH RAILING: A LOT
MOVING TO AND FROM BED TO CHAIR: A LITTLE
DRESSING REGULAR UPPER BODY CLOTHING: A LITTLE
STANDING UP FROM CHAIR USING ARMS: A LITTLE
DRESSING REGULAR LOWER BODY CLOTHING: A LITTLE
DRESSING REGULAR LOWER BODY CLOTHING: A LITTLE
CLIMB 3 TO 5 STEPS WITH RAILING: A LOT
TOILETING: A LITTLE
HELP NEEDED FOR BATHING: A LITTLE
TURNING FROM BACK TO SIDE WHILE IN FLAT BAD: A LITTLE
TOILETING: A LITTLE

## 2024-09-01 ASSESSMENT — ACTIVITIES OF DAILY LIVING (ADL)
WALKS IN HOME: NEEDS ASSISTANCE
TOILETING: NEEDS ASSISTANCE
PATIENT'S MEMORY ADEQUATE TO SAFELY COMPLETE DAILY ACTIVITIES?: NO
GROOMING: NEEDS ASSISTANCE
FEEDING YOURSELF: NEEDS ASSISTANCE
BATHING: NEEDS ASSISTANCE
HEARING - RIGHT EAR: FUNCTIONAL
HEARING - LEFT EAR: FUNCTIONAL
ADEQUATE_TO_COMPLETE_ADL: YES
DRESSING YOURSELF: NEEDS ASSISTANCE
JUDGMENT_ADEQUATE_SAFELY_COMPLETE_DAILY_ACTIVITIES: NO
LACK_OF_TRANSPORTATION: NO

## 2024-09-01 ASSESSMENT — PATIENT HEALTH QUESTIONNAIRE - PHQ9
1. LITTLE INTEREST OR PLEASURE IN DOING THINGS: NOT AT ALL
2. FEELING DOWN, DEPRESSED OR HOPELESS: NOT AT ALL
SUM OF ALL RESPONSES TO PHQ9 QUESTIONS 1 & 2: 0

## 2024-09-01 ASSESSMENT — PAIN - FUNCTIONAL ASSESSMENT
PAIN_FUNCTIONAL_ASSESSMENT: 0-10
PAIN_FUNCTIONAL_ASSESSMENT: 0-10

## 2024-09-01 ASSESSMENT — PAIN SCALES - GENERAL
PAINLEVEL_OUTOF10: 0 - NO PAIN

## 2024-09-01 ASSESSMENT — LIFESTYLE VARIABLES
AUDIT-C TOTAL SCORE: 1
SKIP TO QUESTIONS 9-10: 1
HOW OFTEN DO YOU HAVE 6 OR MORE DRINKS ON ONE OCCASION: NEVER
HOW OFTEN DO YOU HAVE A DRINK CONTAINING ALCOHOL: MONTHLY OR LESS
HOW MANY STANDARD DRINKS CONTAINING ALCOHOL DO YOU HAVE ON A TYPICAL DAY: 1 OR 2
AUDIT-C TOTAL SCORE: 1

## 2024-09-01 NOTE — PROGRESS NOTES
"Jennifer Murrell is a 78 y.o. female on day 0 of admission presenting with Acute metabolic encephalopathy.    Subjective   Patient admitted overnight, this morning, alert and oriented to self and place, understands she was admitted for likely UTI, feeling better today.  No fevers or chills reported.  Did endorse having ongoing hallucinations throughout the past few weeks, did not endorse having any at the time of interview and examination.    Objective     Physical Exam  Vitals reviewed.   Constitutional:       Appearance: Normal appearance.   HENT:      Head: Normocephalic and atraumatic.      Nose: Nose normal.      Mouth/Throat:      Mouth: Mucous membranes are moist.   Eyes:      Extraocular Movements: Extraocular movements intact.      Conjunctiva/sclera: Conjunctivae normal.      Pupils: Pupils are equal, round, and reactive to light.   Cardiovascular:      Rate and Rhythm: Normal rate and regular rhythm.      Pulses: Normal pulses.      Heart sounds: Normal heart sounds.   Pulmonary:      Effort: Pulmonary effort is normal.      Breath sounds: Normal breath sounds.   Abdominal:      General: Bowel sounds are normal.      Palpations: Abdomen is soft.   Musculoskeletal:         General: Normal range of motion.      Cervical back: Normal range of motion and neck supple.   Skin:     General: Skin is warm and dry.   Neurological:      General: No focal deficit present. Pill orlling tremor noted in      Mental Status: She is alert. Mental status is at baseline.   Psychiatric:         Mood and Affect: Mood normal.         Behavior: Behavior normal.     Last Recorded Vitals  Blood pressure 172/90, pulse 61, temperature 36.4 °C (97.5 °F), resp. rate 18, height 1.702 m (5' 7\"), weight 67.6 kg (149 lb 1.6 oz), SpO2 99%.  Intake/Output last 3 Shifts:  No intake/output data recorded.    Relevant Results  Scheduled medications  [START ON 9/2/2024] cefTRIAXone, 1 g, intravenous, q24h  enoxaparin, 40 mg, subcutaneous, " q24h  pantoprazole, 40 mg, oral, Daily before breakfast   Or  pantoprazole, 40 mg, intravenous, Daily before breakfast  QUEtiapine, 25 mg, oral, Nightly      Continuous medications  sodium chloride 0.9%, 75 mL/hr, Last Rate: 75 mL/hr (09/01/24 0432)      PRN medications  PRN medications: melatonin, [DISCONTINUED] ondansetron ODT **OR** ondansetron, polyethylene glycol, QUEtiapine    Results for orders placed or performed during the hospital encounter of 08/31/24 (from the past 24 hour(s))   CBC with Differential   Result Value Ref Range    WBC 7.3 4.4 - 11.3 x10*3/uL    nRBC 0.0 0.0 - 0.0 /100 WBCs    RBC 3.88 (L) 4.00 - 5.20 x10*6/uL    Hemoglobin 12.3 12.0 - 16.0 g/dL    Hematocrit 36.4 36.0 - 46.0 %    MCV 94 80 - 100 fL    MCH 31.7 26.0 - 34.0 pg    MCHC 33.8 32.0 - 36.0 g/dL    RDW 11.9 11.5 - 14.5 %    Platelets 216 150 - 450 x10*3/uL    Neutrophils % 73.8 40.0 - 80.0 %    Immature Granulocytes %, Automated 0.4 0.0 - 0.9 %    Lymphocytes % 16.0 13.0 - 44.0 %    Monocytes % 8.7 2.0 - 10.0 %    Eosinophils % 0.4 0.0 - 6.0 %    Basophils % 0.7 0.0 - 2.0 %    Neutrophils Absolute 5.41 1.60 - 5.50 x10*3/uL    Immature Granulocytes Absolute, Automated 0.03 0.00 - 0.50 x10*3/uL    Lymphocytes Absolute 1.17 0.80 - 3.00 x10*3/uL    Monocytes Absolute 0.64 0.05 - 0.80 x10*3/uL    Eosinophils Absolute 0.03 0.00 - 0.40 x10*3/uL    Basophils Absolute 0.05 0.00 - 0.10 x10*3/uL   Comprehensive Metabolic Panel   Result Value Ref Range    Glucose 113 (H) 74 - 99 mg/dL    Sodium 135 (L) 136 - 145 mmol/L    Potassium 3.5 3.5 - 5.3 mmol/L    Chloride 103 98 - 107 mmol/L    Bicarbonate 19 (L) 21 - 32 mmol/L    Anion Gap 17 10 - 20 mmol/L    Urea Nitrogen 28 (H) 6 - 23 mg/dL    Creatinine 1.36 (H) 0.50 - 1.05 mg/dL    eGFR 40 (L) >60 mL/min/1.73m*2    Calcium 9.3 8.6 - 10.3 mg/dL    Albumin 4.0 3.4 - 5.0 g/dL    Alkaline Phosphatase 76 33 - 136 U/L    Total Protein 6.5 6.4 - 8.2 g/dL    AST 5 (L) 9 - 39 U/L    Bilirubin, Total 0.7  0.0 - 1.2 mg/dL    ALT <3 (L) 7 - 45 U/L   POCT GLUCOSE   Result Value Ref Range    POCT Glucose 118 (H) 74 - 99 mg/dL   Urinalysis with Reflex Culture and Microscopic   Result Value Ref Range    Color, Urine Yellow Light-Yellow, Yellow, Dark-Yellow    Appearance, Urine Turbid (N) Clear    Specific Gravity, Urine 1.009 1.005 - 1.035    pH, Urine 6.5 5.0, 5.5, 6.0, 6.5, 7.0, 7.5, 8.0    Protein, Urine 20 (TRACE) NEGATIVE, 10 (TRACE), 20 (TRACE) mg/dL    Glucose, Urine Normal Normal mg/dL    Blood, Urine NEGATIVE NEGATIVE    Ketones, Urine TRACE (A) NEGATIVE mg/dL    Bilirubin, Urine NEGATIVE NEGATIVE    Urobilinogen, Urine Normal Normal mg/dL    Nitrite, Urine NEGATIVE NEGATIVE    Leukocyte Esterase, Urine 250 Mohan/µL (A) NEGATIVE   Extra Urine Gray Tube   Result Value Ref Range    Extra Tube Hold for add-ons.    Microscopic Only, Urine   Result Value Ref Range    WBC, Urine 6-10 (A) 1-5, NONE /HPF    RBC, Urine 1-2 NONE, 1-2, 3-5 /HPF    Squamous Epithelial Cells, Urine 10-25 (FEW) Reference range not established. /HPF    Bacteria, Urine 1+ (A) NONE SEEN /HPF    Mucus, Urine FEW Reference range not established. /LPF    Hyaline Casts, Urine 1+ (A) NONE /LPF   CBC   Result Value Ref Range    WBC 6.7 4.4 - 11.3 x10*3/uL    nRBC 0.0 0.0 - 0.0 /100 WBCs    RBC 3.58 (L) 4.00 - 5.20 x10*6/uL    Hemoglobin 11.2 (L) 12.0 - 16.0 g/dL    Hematocrit 34.5 (L) 36.0 - 46.0 %    MCV 96 80 - 100 fL    MCH 31.3 26.0 - 34.0 pg    MCHC 32.5 32.0 - 36.0 g/dL    RDW 11.9 11.5 - 14.5 %    Platelets 183 150 - 450 x10*3/uL   Comprehensive metabolic panel   Result Value Ref Range    Glucose 94 74 - 99 mg/dL    Sodium 141 136 - 145 mmol/L    Potassium 3.4 (L) 3.5 - 5.3 mmol/L    Chloride 107 98 - 107 mmol/L    Bicarbonate 25 21 - 32 mmol/L    Anion Gap 12 10 - 20 mmol/L    Urea Nitrogen 24 (H) 6 - 23 mg/dL    Creatinine 1.07 (H) 0.50 - 1.05 mg/dL    eGFR 54 (L) >60 mL/min/1.73m*2    Calcium 8.6 8.6 - 10.3 mg/dL    Albumin 3.6 3.4 - 5.0 g/dL     Alkaline Phosphatase 69 33 - 136 U/L    Total Protein 5.8 (L) 6.4 - 8.2 g/dL    AST 5 (L) 9 - 39 U/L    Bilirubin, Total 0.5 0.0 - 1.2 mg/dL    ALT <3 (L) 7 - 45 U/L     CT head wo IV contrast    Result Date: 9/1/2024  Interpreted By:  Grecia Mcqueen, STUDY: CT HEAD WO IV CONTRAST;  9/1/2024 1:06 am   INDICATION: Signs/Symptoms:increased hallucinations.   COMPARISON: None.   ACCESSION NUMBER(S): [id number]   ORDERING CLINICIAN: DEANNE MALONE   TECHNIQUE: Axial noncontrast CT images of the head.   FINDINGS: BRAIN PARENCHYMA: Mild deep and periventricular white matter hypodensities are nonspecific, but favored to represent chronic small vessel ischemic changes.  Gray-white matter interfaces are preserved. No mass effect or midline shift.   HEMORRHAGE: No acute intracranial hemorrhage. VENTRICLES and EXTRA-AXIAL SPACES: The ventricles and sulci are within normal limits in size for brain volume. No abnormal extraaxial fluid collection. EXTRACRANIAL SOFT TISSUES: Within normal limits. PARANASAL SINUSES/MASTOIDS:  The visualized paranasal sinuses and mastoid air cells are aerated. CALVARIUM: No depressed skull fracture. No destructive osseous lesion.   OTHER FINDINGS: None.       No acute intracranial abnormality.     MACRO: None   Signed by: Grecia Mcqueen 9/1/2024 1:35 AM Dictation workstation:   [id number]    XR chest 1 view    Result Date: 9/1/2024  Interpreted By:  Shin Rocha, STUDY: XR CHEST 1 VIEW;  8/31/2024 11:50 pm   INDICATION: Signs/Symptoms:ams.   COMPARISON: None.   ACCESSION NUMBER(S): [id number]   ORDERING CLINICIAN: DEANNE MALONE   FINDINGS:     The cardiomediastinal silhouette and pulmonary vasculature are within normal limits. There is mild prominence of peribronchovascular interstitium. No consolidation, pleural effusion or pneumothorax.         Mild prominence of the peribronchovascular interstitium that could relate to small airways disease such as asthma or bronchitis.   Report   MACRO:  None.   Signed by: Shin Rocha 9/1/2024 12:38 AM Dictation workstation:   [id number]         Assessment/Plan   Assessment & Plan  Acute metabolic encephalopathy    Parkinson disease (Multi)    HTN (hypertension)    HLD (hyperlipidemia)    Plan:  - stable to remain at current level of care  - continue to treat UTI, pending culture results  - appreciate psychiatry consultation  - will reconcile medications once review completed with pharmacy  - low dose seroquel at night added by psychiatry    Diet: regular  VTE ppx: enoxparin  Code: FULL           I spent 45 minutes in the professional and overall care of this patient.    Chet John MD

## 2024-09-01 NOTE — NURSING NOTE
EOS- No acute changes throughout the shift. Patient hallucinating throughout the shift and talking to people that aren't there. Family at bedside this afternoon. Patient up in the chair throughout the shift. IV fluids stopped. Safety maintained and call light within reach.

## 2024-09-01 NOTE — H&P
History Of Present Illness  Jennifer Murrell is a 78 y.o. female presenting with what was described as altered mental status.  Patient's son was initially at bedside in the emergency department but not at the time of my assessment.  He reports that patient has had increased visual hallucinations reporting seeing people that she understands are not there.  Patient understands that these are hallucinations and is able to separate these manifestations from physical reality.  She endorses decreased appetite and family states there is been overall strong evidence of worsening baseline cognition.  She does follow in the outpatient setting with neurology.  Last documented encounter noted to be 8/16/2024.  At that time, patient was described as requiring increasing support to complete basic ADLs with the idea that it may no longer be safe for her to live independently.  At the time of admission, workup to date had been overall reassuring and unremarkable.  UA shows elevated leukocyte esterase, WBCs, 1+ bacteria, but nitrate negative.  Patient was started on IV Rocephin.  Admitted for ongoing management.     Past Medical History  History reviewed. No pertinent past medical history.    Surgical History  History reviewed. No pertinent surgical history.     Social History  She reports that she has never smoked. She has never used smokeless tobacco. She reports that she does not drink alcohol and does not use drugs.    Family History  No family history on file.     Allergies  Sulfamethoxazole-trimethoprim and Sulfa (sulfonamide antibiotics)    Review of Systems   All other systems reviewed and are negative.       Physical Exam  Vitals reviewed.   Constitutional:       Appearance: Normal appearance.   HENT:      Head: Normocephalic and atraumatic.      Nose: Nose normal.      Mouth/Throat:      Mouth: Mucous membranes are moist.   Eyes:      Extraocular Movements: Extraocular movements intact.      Conjunctiva/sclera:  "Conjunctivae normal.      Pupils: Pupils are equal, round, and reactive to light.   Cardiovascular:      Rate and Rhythm: Normal rate and regular rhythm.      Pulses: Normal pulses.      Heart sounds: Normal heart sounds.   Pulmonary:      Effort: Pulmonary effort is normal.      Breath sounds: Normal breath sounds.   Abdominal:      General: Bowel sounds are normal.      Palpations: Abdomen is soft.   Musculoskeletal:         General: Normal range of motion.      Cervical back: Normal range of motion and neck supple.   Skin:     General: Skin is warm and dry.   Neurological:      General: No focal deficit present.      Mental Status: She is alert. Mental status is at baseline.   Psychiatric:         Mood and Affect: Mood normal.         Behavior: Behavior normal.          Last Recorded Vitals  Blood pressure 152/70, pulse 60, temperature 36.9 °C (98.4 °F), temperature source Temporal, resp. rate 20, height 1.702 m (5' 7\"), weight 67.1 kg (148 lb), SpO2 96%.    Relevant Results  Scheduled medications  pantoprazole, 40 mg, oral, Daily before breakfast   Or  pantoprazole, 40 mg, intravenous, Daily before breakfast      Continuous medications  sodium chloride 0.9%, 75 mL/hr, Last Rate: 75 mL/hr (09/01/24 0432)      PRN medications  PRN medications: acetaminophen **OR** acetaminophen **OR** acetaminophen, acetaminophen **OR** acetaminophen **OR** acetaminophen, melatonin, ondansetron ODT **OR** ondansetron, polyethylene glycol  CT head wo IV contrast    Result Date: 9/1/2024  Interpreted By:  Grecia Mcqueen, STUDY: CT HEAD WO IV CONTRAST;  9/1/2024 1:06 am   INDICATION: Signs/Symptoms:increased hallucinations.   COMPARISON: None.   ACCESSION NUMBER(S): [id number]   ORDERING CLINICIAN: DEANNE MALONE   TECHNIQUE: Axial noncontrast CT images of the head.   FINDINGS: BRAIN PARENCHYMA: Mild deep and periventricular white matter hypodensities are nonspecific, but favored to represent chronic small vessel ischemic changes.  " Gray-white matter interfaces are preserved. No mass effect or midline shift.   HEMORRHAGE: No acute intracranial hemorrhage. VENTRICLES and EXTRA-AXIAL SPACES: The ventricles and sulci are within normal limits in size for brain volume. No abnormal extraaxial fluid collection. EXTRACRANIAL SOFT TISSUES: Within normal limits. PARANASAL SINUSES/MASTOIDS:  The visualized paranasal sinuses and mastoid air cells are aerated. CALVARIUM: No depressed skull fracture. No destructive osseous lesion.   OTHER FINDINGS: None.       No acute intracranial abnormality.     MACRO: None   Signed by: Grecia Mcqueen 9/1/2024 1:35 AM Dictation workstation:   [id number]    XR chest 1 view    Result Date: 9/1/2024  Interpreted By:  Shin Rocha, STUDY: XR CHEST 1 VIEW;  8/31/2024 11:50 pm   INDICATION: Signs/Symptoms:ams.   COMPARISON: None.   ACCESSION NUMBER(S): [id number]   ORDERING CLINICIAN: DEANNE MALONE   FINDINGS:     The cardiomediastinal silhouette and pulmonary vasculature are within normal limits. There is mild prominence of peribronchovascular interstitium. No consolidation, pleural effusion or pneumothorax.         Mild prominence of the peribronchovascular interstitium that could relate to small airways disease such as asthma or bronchitis.   Report   MACRO: None.   Signed by: Shin Rocha 9/1/2024 12:38 AM Dictation workstation:   [id number]   Results for orders placed or performed during the hospital encounter of 08/31/24 (from the past 24 hour(s))   CBC with Differential   Result Value Ref Range    WBC 7.3 4.4 - 11.3 x10*3/uL    nRBC 0.0 0.0 - 0.0 /100 WBCs    RBC 3.88 (L) 4.00 - 5.20 x10*6/uL    Hemoglobin 12.3 12.0 - 16.0 g/dL    Hematocrit 36.4 36.0 - 46.0 %    MCV 94 80 - 100 fL    MCH 31.7 26.0 - 34.0 pg    MCHC 33.8 32.0 - 36.0 g/dL    RDW 11.9 11.5 - 14.5 %    Platelets 216 150 - 450 x10*3/uL    Neutrophils % 73.8 40.0 - 80.0 %    Immature Granulocytes %, Automated 0.4 0.0 - 0.9 %    Lymphocytes % 16.0  13.0 - 44.0 %    Monocytes % 8.7 2.0 - 10.0 %    Eosinophils % 0.4 0.0 - 6.0 %    Basophils % 0.7 0.0 - 2.0 %    Neutrophils Absolute 5.41 1.60 - 5.50 x10*3/uL    Immature Granulocytes Absolute, Automated 0.03 0.00 - 0.50 x10*3/uL    Lymphocytes Absolute 1.17 0.80 - 3.00 x10*3/uL    Monocytes Absolute 0.64 0.05 - 0.80 x10*3/uL    Eosinophils Absolute 0.03 0.00 - 0.40 x10*3/uL    Basophils Absolute 0.05 0.00 - 0.10 x10*3/uL   Comprehensive Metabolic Panel   Result Value Ref Range    Glucose 113 (H) 74 - 99 mg/dL    Sodium 135 (L) 136 - 145 mmol/L    Potassium 3.5 3.5 - 5.3 mmol/L    Chloride 103 98 - 107 mmol/L    Bicarbonate 19 (L) 21 - 32 mmol/L    Anion Gap 17 10 - 20 mmol/L    Urea Nitrogen 28 (H) 6 - 23 mg/dL    Creatinine 1.36 (H) 0.50 - 1.05 mg/dL    eGFR 40 (L) >60 mL/min/1.73m*2    Calcium 9.3 8.6 - 10.3 mg/dL    Albumin 4.0 3.4 - 5.0 g/dL    Alkaline Phosphatase 76 33 - 136 U/L    Total Protein 6.5 6.4 - 8.2 g/dL    AST 5 (L) 9 - 39 U/L    Bilirubin, Total 0.7 0.0 - 1.2 mg/dL    ALT <3 (L) 7 - 45 U/L   POCT GLUCOSE   Result Value Ref Range    POCT Glucose 118 (H) 74 - 99 mg/dL   Urinalysis with Reflex Culture and Microscopic   Result Value Ref Range    Color, Urine Yellow Light-Yellow, Yellow, Dark-Yellow    Appearance, Urine Turbid (N) Clear    Specific Gravity, Urine 1.009 1.005 - 1.035    pH, Urine 6.5 5.0, 5.5, 6.0, 6.5, 7.0, 7.5, 8.0    Protein, Urine 20 (TRACE) NEGATIVE, 10 (TRACE), 20 (TRACE) mg/dL    Glucose, Urine Normal Normal mg/dL    Blood, Urine NEGATIVE NEGATIVE    Ketones, Urine TRACE (A) NEGATIVE mg/dL    Bilirubin, Urine NEGATIVE NEGATIVE    Urobilinogen, Urine Normal Normal mg/dL    Nitrite, Urine NEGATIVE NEGATIVE    Leukocyte Esterase, Urine 250 Mohan/µL (A) NEGATIVE   Microscopic Only, Urine   Result Value Ref Range    WBC, Urine 6-10 (A) 1-5, NONE /HPF    RBC, Urine 1-2 NONE, 1-2, 3-5 /HPF    Squamous Epithelial Cells, Urine 10-25 (FEW) Reference range not established. /HPF    Bacteria,  Urine 1+ (A) NONE SEEN /HPF    Mucus, Urine FEW Reference range not established. /LPF    Hyaline Casts, Urine 1+ (A) NONE /LPF       Assessment/Plan   Assessment & Plan  Acute metabolic encephalopathy  Patient is resting comfortably and in no acute distress.  At the time of my assessment she is ANO x 3 but does demonstrate periods of confusion.  She endorses hallucinations which are variable but states that she is aware that she should not be seeing these things and does not necessarily find them concerning.  No recent medication changes.  No new medications.  She does follow closely with neurology in the outpatient setting.  Dr. Sandi Coppola is her primary neurologist.  Previous documentation notes instances of psychomotor agitation however symptoms have been well-controlled for some time.  Will place consultation for psychiatry service for formal recommendations and inpatient management.  Parkinson disease (Multi)  Home medication regimen to be reviewed and resumed.  Fall precautions to be maintained.  PT/OT assessment  HTN (hypertension)  Hypertensive upon arrival to the floor.  Home antihypertensive regimen to be resumed.  As needed hydralazine for systolic blood pressures >180 x 1 dose.  HLD (hyperlipidemia)  Continue home statin    Diet: Cardiac  Fluids: Normal saline at 75 cc/h x 1 L  DVT prophylaxis: SCDs.  High fall risk  Telemetry: Not Indicated    Home Medications: Pending review     I spent >75 minutes in the professional and overall care of this patient.      Farooq Brand,

## 2024-09-01 NOTE — NURSING NOTE
0350 Patient arrived on unit from [facility] in a wheelchair, patient is A&Ox3 but forgetful with history of dementia. Patient is up with SBA. Patient safety maintained, call light within reach

## 2024-09-01 NOTE — CONSULTS
Reason for consult:  Hallucinations    History Of Present Illness  Jennifer Murrell is a 78 y.o. female presenting with visual hallucination.  Patient lives alone and she has a history of Parkinson's disease on amantadine.  Patient was seen with her son at the bedside.    For the last 3 weeks she has been declining with worsening of her hallucinations.  She believes that her children playing at the house and she also believes that people have broken into the house.  She has called the  through the neighbors.  Patient send had noticed her getting more confused in the nighttime.  She has been rearranging things at home.  There has also been significant cognitive decline with impairment of short-term memory.  Patient is able to give a coherent account of all the hallucinatory phenomena.  He went during the interview I saw her talking to the visual images asking the person to leave the room.    Patient never had symptoms like this before.  She denies any depression hopeless or worthless feeling.  She denies any suicidal or homicidal thoughts.  She denies any paranoid thoughts.    No alcohol or drug use    Past Medical History  Parkinson's disease patient seen neurologist    Surgical History  She has no past surgical history on file.     Social History  She reports that she has never smoked. She has never used smokeless tobacco. She reports that she does not drink alcohol and does not use drugs.     Allergies  Sulfamethoxazole-trimethoprim and Sulfa (sulfonamide antibiotics)        Mental Status Exam  Alert and oriented x 3 cooperative maintain eye contact normal psychomotor activity.  Speech is normal in rate and rhythm coherent.  Patient described her mood to be anxious but not depressed.  She does not have any formal thought disorder.  Patient has concrete thinking.  She has visual hallucinations.  No auditory hallucination.  Cognition is impaired.  Judgment and insight is impaired.    Psychiatric Risk  "Assessment  High risk of acting out on the visual hallucinations    Last Recorded Vitals  Blood pressure 172/90, pulse 61, temperature 36.4 °C (97.5 °F), resp. rate 18, height 1.702 m (5' 7\"), weight 67.6 kg (149 lb 1.6 oz), SpO2 99%.    Relevant Results  Results for orders placed or performed during the hospital encounter of 08/31/24 (from the past 96 hour(s))   CBC with Differential   Result Value Ref Range    WBC 7.3 4.4 - 11.3 x10*3/uL    nRBC 0.0 0.0 - 0.0 /100 WBCs    RBC 3.88 (L) 4.00 - 5.20 x10*6/uL    Hemoglobin 12.3 12.0 - 16.0 g/dL    Hematocrit 36.4 36.0 - 46.0 %    MCV 94 80 - 100 fL    MCH 31.7 26.0 - 34.0 pg    MCHC 33.8 32.0 - 36.0 g/dL    RDW 11.9 11.5 - 14.5 %    Platelets 216 150 - 450 x10*3/uL    Neutrophils % 73.8 40.0 - 80.0 %    Immature Granulocytes %, Automated 0.4 0.0 - 0.9 %    Lymphocytes % 16.0 13.0 - 44.0 %    Monocytes % 8.7 2.0 - 10.0 %    Eosinophils % 0.4 0.0 - 6.0 %    Basophils % 0.7 0.0 - 2.0 %    Neutrophils Absolute 5.41 1.60 - 5.50 x10*3/uL    Immature Granulocytes Absolute, Automated 0.03 0.00 - 0.50 x10*3/uL    Lymphocytes Absolute 1.17 0.80 - 3.00 x10*3/uL    Monocytes Absolute 0.64 0.05 - 0.80 x10*3/uL    Eosinophils Absolute 0.03 0.00 - 0.40 x10*3/uL    Basophils Absolute 0.05 0.00 - 0.10 x10*3/uL   Comprehensive Metabolic Panel   Result Value Ref Range    Glucose 113 (H) 74 - 99 mg/dL    Sodium 135 (L) 136 - 145 mmol/L    Potassium 3.5 3.5 - 5.3 mmol/L    Chloride 103 98 - 107 mmol/L    Bicarbonate 19 (L) 21 - 32 mmol/L    Anion Gap 17 10 - 20 mmol/L    Urea Nitrogen 28 (H) 6 - 23 mg/dL    Creatinine 1.36 (H) 0.50 - 1.05 mg/dL    eGFR 40 (L) >60 mL/min/1.73m*2    Calcium 9.3 8.6 - 10.3 mg/dL    Albumin 4.0 3.4 - 5.0 g/dL    Alkaline Phosphatase 76 33 - 136 U/L    Total Protein 6.5 6.4 - 8.2 g/dL    AST 5 (L) 9 - 39 U/L    Bilirubin, Total 0.7 0.0 - 1.2 mg/dL    ALT <3 (L) 7 - 45 U/L   POCT GLUCOSE   Result Value Ref Range    POCT Glucose 118 (H) 74 - 99 mg/dL "   Urinalysis with Reflex Culture and Microscopic   Result Value Ref Range    Color, Urine Yellow Light-Yellow, Yellow, Dark-Yellow    Appearance, Urine Turbid (N) Clear    Specific Gravity, Urine 1.009 1.005 - 1.035    pH, Urine 6.5 5.0, 5.5, 6.0, 6.5, 7.0, 7.5, 8.0    Protein, Urine 20 (TRACE) NEGATIVE, 10 (TRACE), 20 (TRACE) mg/dL    Glucose, Urine Normal Normal mg/dL    Blood, Urine NEGATIVE NEGATIVE    Ketones, Urine TRACE (A) NEGATIVE mg/dL    Bilirubin, Urine NEGATIVE NEGATIVE    Urobilinogen, Urine Normal Normal mg/dL    Nitrite, Urine NEGATIVE NEGATIVE    Leukocyte Esterase, Urine 250 Mohan/µL (A) NEGATIVE   Microscopic Only, Urine   Result Value Ref Range    WBC, Urine 6-10 (A) 1-5, NONE /HPF    RBC, Urine 1-2 NONE, 1-2, 3-5 /HPF    Squamous Epithelial Cells, Urine 10-25 (FEW) Reference range not established. /HPF    Bacteria, Urine 1+ (A) NONE SEEN /HPF    Mucus, Urine FEW Reference range not established. /LPF    Hyaline Casts, Urine 1+ (A) NONE /LPF   CBC   Result Value Ref Range    WBC 6.7 4.4 - 11.3 x10*3/uL    nRBC 0.0 0.0 - 0.0 /100 WBCs    RBC 3.58 (L) 4.00 - 5.20 x10*6/uL    Hemoglobin 11.2 (L) 12.0 - 16.0 g/dL    Hematocrit 34.5 (L) 36.0 - 46.0 %    MCV 96 80 - 100 fL    MCH 31.3 26.0 - 34.0 pg    MCHC 32.5 32.0 - 36.0 g/dL    RDW 11.9 11.5 - 14.5 %    Platelets 183 150 - 450 x10*3/uL   Comprehensive metabolic panel   Result Value Ref Range    Glucose 94 74 - 99 mg/dL    Sodium 141 136 - 145 mmol/L    Potassium 3.4 (L) 3.5 - 5.3 mmol/L    Chloride 107 98 - 107 mmol/L    Bicarbonate 25 21 - 32 mmol/L    Anion Gap 12 10 - 20 mmol/L    Urea Nitrogen 24 (H) 6 - 23 mg/dL    Creatinine 1.07 (H) 0.50 - 1.05 mg/dL    eGFR 54 (L) >60 mL/min/1.73m*2    Calcium 8.6 8.6 - 10.3 mg/dL    Albumin 3.6 3.4 - 5.0 g/dL    Alkaline Phosphatase 69 33 - 136 U/L    Total Protein 5.8 (L) 6.4 - 8.2 g/dL    AST 5 (L) 9 - 39 U/L    Bilirubin, Total 0.5 0.0 - 1.2 mg/dL    ALT <3 (L) 7 - 45 U/L     CT head wo IV  contrast    Result Date: 9/1/2024  Interpreted By:  Grecia Mcqueen, STUDY: CT HEAD WO IV CONTRAST;  9/1/2024 1:06 am   INDICATION: Signs/Symptoms:increased hallucinations.   COMPARISON: None.   ACCESSION NUMBER(S): [id number]   ORDERING CLINICIAN: DEANNE MALONE   TECHNIQUE: Axial noncontrast CT images of the head.   FINDINGS: BRAIN PARENCHYMA: Mild deep and periventricular white matter hypodensities are nonspecific, but favored to represent chronic small vessel ischemic changes.  Gray-white matter interfaces are preserved. No mass effect or midline shift.   HEMORRHAGE: No acute intracranial hemorrhage. VENTRICLES and EXTRA-AXIAL SPACES: The ventricles and sulci are within normal limits in size for brain volume. No abnormal extraaxial fluid collection. EXTRACRANIAL SOFT TISSUES: Within normal limits. PARANASAL SINUSES/MASTOIDS:  The visualized paranasal sinuses and mastoid air cells are aerated. CALVARIUM: No depressed skull fracture. No destructive osseous lesion.   OTHER FINDINGS: None.       No acute intracranial abnormality.     MACRO: None   Signed by: Grecia Mcqueen 9/1/2024 1:35 AM Dictation workstation:   [id number]    XR chest 1 view    Result Date: 9/1/2024  Interpreted By:  Shin Rocha, STUDY: XR CHEST 1 VIEW;  8/31/2024 11:50 pm   INDICATION: Signs/Symptoms:ams.   COMPARISON: None.   ACCESSION NUMBER(S): [id number]   ORDERING CLINICIAN: DEANNE MALONE   FINDINGS:     The cardiomediastinal silhouette and pulmonary vasculature are within normal limits. There is mild prominence of peribronchovascular interstitium. No consolidation, pleural effusion or pneumothorax.         Mild prominence of the peribronchovascular interstitium that could relate to small airways disease such as asthma or bronchitis.   Report   MACRO: None.   Signed by: Shin Rocha 9/1/2024 12:38 AM Dictation workstation:   [id number]       Assessment/Plan   Assessment & Plan  Acute metabolic encephalopathy    HLD  (hyperlipidemia)    HTN (hypertension)    Parkinson disease (Multi)      Parkinson psychosis    Please rule out any organic cause of confusion which could cause hallucinatory phenomena  Parkinson per se can cause visual hallucination.  Moreover amantadine that patient has been taking can also cause hallucinations  I would recommend a low-dose of Seroquel to be started in the nighttime so that she sleeps better and it works for Parkinson's hallucinations  Discussed with the family about the treatment plan and is agreeable  continue to monitor           Ba Moscoso MD

## 2024-09-01 NOTE — ASSESSMENT & PLAN NOTE
Home medication regimen to be reviewed and resumed.  Fall precautions to be maintained.  PT/OT assessment

## 2024-09-01 NOTE — ASSESSMENT & PLAN NOTE
Patient is resting comfortably and in no acute distress.  At the time of my assessment she is ANO x 3 but does demonstrate periods of confusion.  She endorses hallucinations which are variable but states that she is aware that she should not be seeing these things and does not necessarily find them concerning.  No recent medication changes.  No new medications.  She does follow closely with neurology in the outpatient setting.  Dr. Sandi Coppola is her primary neurologist.  Previous documentation notes instances of psychomotor agitation however symptoms have been well-controlled for some time.  Will place consultation for psychiatry service for formal recommendations and inpatient management.

## 2024-09-01 NOTE — ASSESSMENT & PLAN NOTE
Hypertensive upon arrival to the floor.  Home antihypertensive regimen to be resumed.  As needed hydralazine for systolic blood pressures >180 x 1 dose.

## 2024-09-01 NOTE — ED PROVIDER NOTES
"EMERGENCY DEPARTMENT ENCOUNTER      Pt Name: Jennifer Murrell  MRN: [id number]  Birthdate 1945  Date of evaluation: 8/31/2024  Provider: Cristal Henning DO    CHIEF COMPLAINT       Chief Complaint   Patient presents with    Altered Mental Status     Per pt's son, pt has a hx of dementia and parkingson's and has been acting different today. Pt's son states that she is hallucinating and he is concerned that she has a uti. Pt denies any complaints or concerns.          HISTORY OF PRESENT ILLNESS    78-year-old female with past medical history of Parkinson's disease and dementia, who presented with altered mental status.  Per son at bedside, the patient experiences increased visual hallucinations (she is seeing \"ugly people\"), increased confusion, and increased forgetfulness over the past 2 weeks.  Patient states that she has loss of appetite and she has not been drinking a lot of water lately.  She says she felt lightheaded when she stands up too quickly.  She also feel constipated.  She denies any fever, chills, headaches, nausea, vomiting, diarrhea, difficulty urinating, dysuria, or hematuria.  Son notes that she has fallen a few x 2 weeks ago on her knees. No recent head injury upon the fall reported.      History provided by:  Patient and relative  History limited by:  Dementia and mental status change   used: No      Nursing Notes were reviewed.    PAST MEDICAL HISTORY   History reviewed. No pertinent past medical history.      SURGICAL HISTORY     History reviewed. No pertinent surgical history.      CURRENT MEDICATIONS       Previous Medications    AMANTADINE (SYMMETREL) 100 MG TABLET    Take 1 tablet (100 mg) by mouth 2 times a day. Twice a day at 8a and noon    AMLODIPINE (NORVASC) 5 MG TABLET    Take 1 tablet (5 mg) by mouth once daily.    ATORVASTATIN (LIPITOR) 20 MG TABLET    Take 1 tablet (20 mg) by mouth once daily at bedtime.    CARBIDOPA-LEVODOPA (SINEMET CR)  MG ER TABLET   "  Take 1 tablet by mouth once daily at bedtime.    CARBIDOPA-LEVODOPA (SINEMET)  MG TABLET    Take 2 tablets by mouth 4 times a day. At 8a, 11a, 2p & 5p    CYANOCOBALAMIN, VITAMIN B-12, (VITAMIN B-12) 1,000 MCG TABLET EXTENDED RELEASE    Take 1 tablet (1,000 mcg) by mouth once daily.    HYDROCHLOROTHIAZIDE (HYDRODIURIL) 25 MG TABLET    Take by mouth.    METHOCARBAMOL (ROBAXIN) 500 MG TABLET    Take by mouth.    OXYBUTYNIN XL (DITROPAN-XL) 10 MG 24 HR TABLET    Take 1 tablet (10 mg) by mouth once daily.    POLYETHYLENE GLYCOL (GLYCOLAX, MIRALAX) 17 GRAM/DOSE POWDER    Take by mouth once daily.    VENLAFAXINE XR (EFFEXOR XR) 150 MG 24 HR CAPSULE    Take by mouth once daily.    VENLAFAXINE XR (EFFEXOR-XR) 75 MG 24 HR CAPSULE    Take 1 capsule (75 mg) by mouth.    VIT C-S.CHERRY-CELERY-GRAPE SD (TART CHERRY) -13-75-20 MG CAPSULE    Take 1 capsule by mouth once daily.       ALLERGIES     Sulfamethoxazole-trimethoprim and Sulfa (sulfonamide antibiotics)    FAMILY HISTORY     No family history on file.       SOCIAL HISTORY       Social History     Socioeconomic History    Marital status:    Tobacco Use    Smoking status: Never    Smokeless tobacco: Never   Substance and Sexual Activity    Alcohol use: Never    Drug use: Never     Social Determinants of Health     Financial Resource Strain: Low Risk  (8/21/2024)    Received from [facility]    Overall Financial Resource Strain (CARDIA)     Difficulty of Paying Living Expenses: Not hard at all   Food Insecurity: No Food Insecurity (8/21/2024)    Received from [facility]    Hunger Vital Sign     Worried About Running Out of Food in the Last Year: Never true     Ran Out of Food in the Last Year: Never true   Transportation Needs: No Transportation Needs (8/21/2024)    Received from [facility]    PRAPARE - Transportation     Lack of Transportation (Medical): No     Lack of Transportation (Non-Medical): No   Physical Activity: Inactive (8/21/2024)    Received  from [facility]    Exercise Vital Sign     Days of Exercise per Week: 1 day     Minutes of Exercise per Session: 0 min   Stress: Stress Concern Present (8/21/2024)    Received from [facility]    [employer] - Occupational Stress Questionnaire     Feeling of Stress : To some extent   Social Connections: Moderately Integrated (8/21/2024)    Received from [facility]    Social Connection and Isolation Panel [NHANES]     Frequency of Communication with Friends and Family: More than three times a week     Frequency of Social Gatherings with Friends and Family: More than three times a week     Attends [employer] Services: 1 to 4 times per year     Active Member of Clubs or Organizations: Yes     Attends Club or Organization Meetings: More than 4 times per year     Marital Status:    Intimate Partner Violence: Not At Risk (8/21/2024)    Received from [facility]    Humiliation, Afraid, Rape, and Kick questionnaire     Fear of Current or Ex-Partner: No     Emotionally Abused: No     Physically Abused: No     Sexually Abused: No   Housing Stability: Unknown (8/21/2024)    Received from [facility]    Housing Stability Vital Sign     Unable to Pay for Housing in the Last Year: No     Homeless in the Last Year: No         PHYSICAL EXAM    (up to 7 for level 4, 8 or more for level 5)     ED Triage Vitals [08/31/24 2302]   Temperature Heart Rate Respirations BP   36.9 °C (98.4 °F) 74 18 (!) 135/94      Pulse Ox Temp Source Heart Rate Source Patient Position   98 % Temporal Monitor Sitting      BP Location FiO2 (%)     Right arm --       Physical Exam  Vitals and nursing note reviewed.   Constitutional:       General: She is not in acute distress.     Appearance: She is ill-appearing (chronically-ill).   HENT:      Head: Normocephalic and atraumatic.      Mouth/Throat:      Mouth: Mucous membranes are dry.   Eyes:      General: No scleral icterus.     Extraocular Movements: Extraocular  movements intact.      Conjunctiva/sclera: Conjunctivae normal.   Cardiovascular:      Rate and Rhythm: Normal rate and regular rhythm.      Pulses: Normal pulses.      Heart sounds: Normal heart sounds. No murmur heard.  Pulmonary:      Effort: Pulmonary effort is normal. No respiratory distress.      Breath sounds: Normal breath sounds. No wheezing, rhonchi or rales.   Abdominal:      General: Bowel sounds are normal. There is no distension.      Palpations: Abdomen is soft.      Tenderness: There is no abdominal tenderness. There is no right CVA tenderness or left CVA tenderness.   Musculoskeletal:         General: No tenderness.      Cervical back: Neck supple.      Right lower leg: No edema.      Left lower leg: No edema.   Skin:     General: Skin is warm and dry.   Neurological:      Mental Status: She is alert and oriented to person, place, and time.      Cranial Nerves: Cranial nerves 2-12 are intact.      Sensory: Sensation is intact.      Motor: Motor function is intact.      Coordination: Finger-Nose-Finger Test and Heel to Shin Test normal.      Comments: NIHSS 0   Psychiatric:         Mood and Affect: Mood normal.         Behavior: Behavior normal.        DIAGNOSTIC RESULTS     LABS:  Labs Reviewed   CBC WITH AUTO DIFFERENTIAL - Abnormal       Result Value    WBC 7.3      nRBC 0.0      RBC 3.88 (*)     Hemoglobin 12.3      Hematocrit 36.4      MCV 94      MCH 31.7      MCHC 33.8      RDW 11.9      Platelets 216      Neutrophils % 73.8      Immature Granulocytes %, Automated 0.4      Lymphocytes % 16.0      Monocytes % 8.7      Eosinophils % 0.4      Basophils % 0.7      Neutrophils Absolute 5.41      Immature Granulocytes Absolute, Automated 0.03      Lymphocytes Absolute 1.17      Monocytes Absolute 0.64      Eosinophils Absolute 0.03      Basophils Absolute 0.05     COMPREHENSIVE METABOLIC PANEL - Abnormal    Glucose 113 (*)     Sodium 135 (*)     Potassium 3.5      Chloride 103      Bicarbonate 19  (*)     Anion Gap 17      Urea Nitrogen 28 (*)     Creatinine 1.36 (*)     eGFR 40 (*)     Calcium 9.3      Albumin 4.0      Alkaline Phosphatase 76      Total Protein 6.5      AST 5 (*)     Bilirubin, Total 0.7      ALT <3 (*)    URINALYSIS WITH REFLEX CULTURE AND MICROSCOPIC - Abnormal    Color, Urine Yellow      Appearance, Urine Turbid (*)     Specific Gravity, Urine 1.009      pH, Urine 6.5      Protein, Urine 20 (TRACE)      Glucose, Urine Normal      Blood, Urine NEGATIVE      Ketones, Urine TRACE (*)     Bilirubin, Urine NEGATIVE      Urobilinogen, Urine Normal      Nitrite, Urine NEGATIVE      Leukocyte Esterase, Urine 250 Mohan/µL (*)    MICROSCOPIC ONLY, URINE - Abnormal    WBC, Urine 6-10 (*)     RBC, Urine 1-2      Squamous Epithelial Cells, Urine 10-25 (FEW)      Bacteria, Urine 1+ (*)     Mucus, Urine FEW      Hyaline Casts, Urine 1+ (*)    POCT GLUCOSE - Abnormal    POCT Glucose 118 (*)    URINE CULTURE   URINALYSIS WITH REFLEX CULTURE AND MICROSCOPIC    Narrative:     The following orders were created for panel order Urinalysis with Reflex Culture and Microscopic.  Procedure                               Abnormality         Status                     ---------                               -----------         ------                     Urinalysis with Reflex C...[[id number]]  Abnormal            Final result               Extra Urine Gray Tube[[id number]]                            In process                   Please view results for these tests on the individual orders.   EXTRA URINE GRAY TUBE   POCT GLUCOSE METER       All other labs were within normal range or not returned as of this dictation.    Imaging  CT head wo IV contrast   Final Result   No acute intracranial abnormality.             MACRO:   None        Signed by: Grecia Mcqueen 9/1/2024 1:35 AM   Dictation workstation:   [id number]      XR chest 1 view   Final Result   Mild prominence of the peribronchovascular interstitium that could    relate to small airways disease such as asthma or bronchitis.        Report        MACRO:   None.        Signed by: Shin Rocha 9/1/2024 12:38 AM   Dictation workstation:   [id number]           Procedures  Procedures     EMERGENCY DEPARTMENT COURSE/MDM:     Diagnoses as of 09/01/24 0250   Acute metabolic encephalopathy   Urinary tract infection associated with catheterization of urinary tract, unspecified indwelling urinary catheter type, initial encounter (CMS-HCC)   EULALIA (acute kidney injury) (CMS-HCC)   Dehydration        Medical Decision Making  This is a 78-year-old female with past medical history of Parkinson's disease and dementia, who presented with 2 weeks of increased visual hallucinations, confusion, and forgetfulness.  On exam, she has dry mucous membranes.  Patient states that she has been having loss of appetite and has not been drinking a lot of water lately.  She, however, has no urinary symptoms.  NIHSS 0.  Abdomen is benign on exam. CBC showed no leukocytosis and anemia.  CMP demonstrated elevated BUN 28 and creatinine 1.36 (baseline Cr ~0.80) concerning for an EULALIA likely prerenal given her appearing dehydrated on my exam.  We started her on 1 L of lactated Ringer's.  Noncontrast CT head did not show any acute intracranial abnormality.  Chest x-ray revealed no evidence of pneumonia.  Urinalysis was positive for leukocyte esterase 250 and bacteria 1+.  Urine culture is pending.  She likely has a metabolic encephalopathy secondary to a UTI and dehydration causing prerenal EULALIA. We started her empirically on ceftriaxone 1 g IV.    Patient is handed off to night team provider Dr. Victoria, who who will admit the patient to general medicine for metabolic encephalopathy secondary to UTI and EULALIA secondary to dehydration.    Amount and/or Complexity of Data Reviewed  Labs: ordered.  Radiology: ordered.      Patient and or family in agreement and understanding of treatment plan.  All questions answered.       I reviewed the case with the attending ED physician. The attending ED physician agrees with the plan. Patient and/or patient´s representative was counseled regarding labs, imaging, likely diagnosis, and plan. All questions were answered.    ED Medications administered this visit:    Medications   lactated Ringer's bolus 1,000 mL (0 mL intravenous Stopped 9/1/24 0249)   cefTRIAXone (Rocephin) 1 g in dextrose (iso) IV 50 mL (0 g intravenous Stopped 9/1/24 0249)       New Prescriptions from this visit:    New Prescriptions    No medications on file       Follow-up:  No follow-up provider specified.      Final Impression:   1. Acute metabolic encephalopathy    2. Urinary tract infection associated with catheterization of urinary tract, unspecified indwelling urinary catheter type, initial encounter (CMS-[facility])    3. EULALIA (acute kidney injury) (CMS-[facility])    4. Dehydration          (Please note that portions of this note were completed with a voice recognition program.  Efforts were made to edit the dictations but occasionally words are mis-transcribed.)        Cristal Henning, DO  Resident  09/01/24 0255

## 2024-09-02 LAB
ANION GAP SERPL CALC-SCNC: 8 MMOL/L (ref 10–20)
BACTERIA UR CULT: NORMAL
BUN SERPL-MCNC: 20 MG/DL (ref 6–23)
CALCIUM SERPL-MCNC: 8.4 MG/DL (ref 8.6–10.3)
CHLORIDE SERPL-SCNC: 109 MMOL/L (ref 98–107)
CO2 SERPL-SCNC: 29 MMOL/L (ref 21–32)
CREAT SERPL-MCNC: 0.89 MG/DL (ref 0.5–1.05)
EGFRCR SERPLBLD CKD-EPI 2021: 67 ML/MIN/1.73M*2
ERYTHROCYTE [DISTWIDTH] IN BLOOD BY AUTOMATED COUNT: 12.1 % (ref 11.5–14.5)
GLUCOSE SERPL-MCNC: 79 MG/DL (ref 74–99)
HCT VFR BLD AUTO: 34.6 % (ref 36–46)
HGB BLD-MCNC: 11 G/DL (ref 12–16)
MAGNESIUM SERPL-MCNC: 2.12 MG/DL (ref 1.6–2.4)
MCH RBC QN AUTO: 31.2 PG (ref 26–34)
MCHC RBC AUTO-ENTMCNC: 31.8 G/DL (ref 32–36)
MCV RBC AUTO: 98 FL (ref 80–100)
NRBC BLD-RTO: 0 /100 WBCS (ref 0–0)
PLATELET # BLD AUTO: 178 X10*3/UL (ref 150–450)
POTASSIUM SERPL-SCNC: 3.4 MMOL/L (ref 3.5–5.3)
RBC # BLD AUTO: 3.53 X10*6/UL (ref 4–5.2)
SODIUM SERPL-SCNC: 143 MMOL/L (ref 136–145)
WBC # BLD AUTO: 4.7 X10*3/UL (ref 4.4–11.3)

## 2024-09-02 PROCEDURE — 2500000002 HC RX 250 W HCPCS SELF ADMINISTERED DRUGS (ALT 637 FOR MEDICARE OP, ALT 636 FOR OP/ED): Performed by: PSYCHIATRY & NEUROLOGY

## 2024-09-02 PROCEDURE — 85027 COMPLETE CBC AUTOMATED: CPT | Performed by: INTERNAL MEDICINE

## 2024-09-02 PROCEDURE — 1100000001 HC PRIVATE ROOM DAILY

## 2024-09-02 PROCEDURE — 80048 BASIC METABOLIC PNL TOTAL CA: CPT | Performed by: INTERNAL MEDICINE

## 2024-09-02 PROCEDURE — 2500000004 HC RX 250 GENERAL PHARMACY W/ HCPCS (ALT 636 FOR OP/ED): Performed by: INTERNAL MEDICINE

## 2024-09-02 PROCEDURE — 2500000001 HC RX 250 WO HCPCS SELF ADMINISTERED DRUGS (ALT 637 FOR MEDICARE OP): Performed by: INTERNAL MEDICINE

## 2024-09-02 PROCEDURE — 2500000002 HC RX 250 W HCPCS SELF ADMINISTERED DRUGS (ALT 637 FOR MEDICARE OP, ALT 636 FOR OP/ED): Performed by: INTERNAL MEDICINE

## 2024-09-02 PROCEDURE — 99232 SBSQ HOSP IP/OBS MODERATE 35: CPT | Performed by: INTERNAL MEDICINE

## 2024-09-02 PROCEDURE — 36415 COLL VENOUS BLD VENIPUNCTURE: CPT | Performed by: INTERNAL MEDICINE

## 2024-09-02 PROCEDURE — 99231 SBSQ HOSP IP/OBS SF/LOW 25: CPT | Performed by: PSYCHIATRY & NEUROLOGY

## 2024-09-02 PROCEDURE — 83735 ASSAY OF MAGNESIUM: CPT | Performed by: INTERNAL MEDICINE

## 2024-09-02 RX ORDER — POTASSIUM CHLORIDE 750 MG/1
10 TABLET, FILM COATED, EXTENDED RELEASE ORAL ONCE
Status: COMPLETED | OUTPATIENT
Start: 2024-09-02 | End: 2024-09-02

## 2024-09-02 RX ADMIN — PANTOPRAZOLE SODIUM 40 MG: 40 TABLET, DELAYED RELEASE ORAL at 06:39

## 2024-09-02 RX ADMIN — AMANTADINE 100 MG: 100 CAPSULE ORAL at 08:46

## 2024-09-02 RX ADMIN — AMANTADINE 100 MG: 100 CAPSULE ORAL at 20:55

## 2024-09-02 RX ADMIN — CARBIDOPA AND LEVODOPA 2 TABLET: 25; 100 TABLET ORAL at 08:46

## 2024-09-02 RX ADMIN — CEFTRIAXONE SODIUM 1 G: 1 INJECTION, SOLUTION INTRAVENOUS at 06:39

## 2024-09-02 RX ADMIN — POTASSIUM CHLORIDE 10 MEQ: 750 TABLET, EXTENDED RELEASE ORAL at 08:45

## 2024-09-02 RX ADMIN — VENLAFAXINE HYDROCHLORIDE 150 MG: 75 CAPSULE, EXTENDED RELEASE ORAL at 06:39

## 2024-09-02 RX ADMIN — CARBIDOPA AND LEVODOPA 2 TABLET: 25; 100 TABLET, EXTENDED RELEASE ORAL at 20:55

## 2024-09-02 RX ADMIN — ENOXAPARIN SODIUM 40 MG: 40 INJECTION SUBCUTANEOUS at 11:02

## 2024-09-02 RX ADMIN — QUETIAPINE FUMARATE 25 MG: 25 TABLET ORAL at 20:55

## 2024-09-02 RX ADMIN — CARBIDOPA AND LEVODOPA 2 TABLET: 25; 100 TABLET ORAL at 13:56

## 2024-09-02 RX ADMIN — CARBIDOPA AND LEVODOPA 2 TABLET: 25; 100 TABLET ORAL at 11:02

## 2024-09-02 RX ADMIN — CARBIDOPA AND LEVODOPA 2 TABLET: 25; 100 TABLET ORAL at 17:00

## 2024-09-02 RX ADMIN — POLYETHYLENE GLYCOL 3350 17 G: 17 POWDER, FOR SOLUTION ORAL at 08:46

## 2024-09-02 ASSESSMENT — COGNITIVE AND FUNCTIONAL STATUS - GENERAL
DRESSING REGULAR LOWER BODY CLOTHING: A LITTLE
WALKING IN HOSPITAL ROOM: A LITTLE
CLIMB 3 TO 5 STEPS WITH RAILING: A LOT
HELP NEEDED FOR BATHING: A LITTLE
TOILETING: A LITTLE
MOVING FROM LYING ON BACK TO SITTING ON SIDE OF FLAT BED WITH BEDRAILS: A LITTLE
DAILY ACTIVITIY SCORE: 21
STANDING UP FROM CHAIR USING ARMS: A LITTLE
DAILY ACTIVITIY SCORE: 21
TURNING FROM BACK TO SIDE WHILE IN FLAT BAD: A LITTLE
MOBILITY SCORE: 20
MOBILITY SCORE: 17
WALKING IN HOSPITAL ROOM: A LITTLE
STANDING UP FROM CHAIR USING ARMS: A LITTLE
TOILETING: A LITTLE
DRESSING REGULAR LOWER BODY CLOTHING: A LITTLE
CLIMB 3 TO 5 STEPS WITH RAILING: A LOT
MOVING TO AND FROM BED TO CHAIR: A LITTLE
HELP NEEDED FOR BATHING: A LITTLE

## 2024-09-02 ASSESSMENT — PAIN SCALES - GENERAL
PAINLEVEL_OUTOF10: 0 - NO PAIN
PAINLEVEL_OUTOF10: 0 - NO PAIN

## 2024-09-02 NOTE — PROGRESS NOTES
"Jennifer Murrell is a 78 y.o. female on day 1 of admission presenting with Acute metabolic encephalopathy.    Subjective       Objective     Physical Exam  Vitals reviewed.   Constitutional:       Appearance: Normal appearance.   HENT:      Head: Normocephalic and atraumatic.      Nose: Nose normal.      Mouth/Throat:      Mouth: Mucous membranes are moist.   Eyes:      Extraocular Movements: Extraocular movements intact.      Conjunctiva/sclera: Conjunctivae normal.      Pupils: Pupils are equal, round, and reactive to light.   Cardiovascular:      Rate and Rhythm: Normal rate and regular rhythm.      Pulses: Normal pulses.      Heart sounds: Normal heart sounds.   Pulmonary:      Effort: Pulmonary effort is normal.      Breath sounds: Normal breath sounds.   Abdominal:      General: Bowel sounds are normal.      Palpations: Abdomen is soft.   Musculoskeletal:         General: Normal range of motion.      Cervical back: Normal range of motion and neck supple.   Skin:     General: Skin is warm and dry.   Neurological:      General: No focal deficit present. Pill orlling tremor noted in      Mental Status: She is alert. Mental status is at baseline.   Psychiatric:         Mood and Affect: Mood normal.         Behavior: Behavior normal.     Last Recorded Vitals  Blood pressure 101/60, pulse 65, temperature 36.1 °C (97 °F), temperature source Temporal, resp. rate 16, height 1.702 m (5' 7\"), weight 67.6 kg (149 lb 1.6 oz), SpO2 97%.  Intake/Output last 3 Shifts:  I/O last 3 completed shifts:  In: 1572.5 (23.3 mL/kg) [P.O.:600; I.V.:972.5 (14.4 mL/kg)]  Out: - (0 mL/kg)   Weight: 67.6 kg     Relevant Results  Scheduled medications  amantadine, 100 mg, oral, BID  carbidopa-levodopa, 2 tablet, oral, Nightly  carbidopa-levodopa, 2 tablet, oral, 4x daily  cefTRIAXone, 1 g, intravenous, q24h  enoxaparin, 40 mg, subcutaneous, q24h  pantoprazole, 40 mg, oral, Daily before breakfast   Or  pantoprazole, 40 mg, intravenous, Daily " before breakfast  polyethylene glycol, 17 g, oral, Daily  QUEtiapine, 25 mg, oral, Nightly  venlafaxine XR, 150 mg, oral, Daily      Continuous medications     PRN medications  PRN medications: melatonin, [DISCONTINUED] ondansetron ODT **OR** ondansetron, polyethylene glycol, QUEtiapine    Results for orders placed or performed during the hospital encounter of 08/31/24 (from the past 24 hour(s))   CBC   Result Value Ref Range    WBC 4.7 4.4 - 11.3 x10*3/uL    nRBC 0.0 0.0 - 0.0 /100 WBCs    RBC 3.53 (L) 4.00 - 5.20 x10*6/uL    Hemoglobin 11.0 (L) 12.0 - 16.0 g/dL    Hematocrit 34.6 (L) 36.0 - 46.0 %    MCV 98 80 - 100 fL    MCH 31.2 26.0 - 34.0 pg    MCHC 31.8 (L) 32.0 - 36.0 g/dL    RDW 12.1 11.5 - 14.5 %    Platelets 178 150 - 450 x10*3/uL   Magnesium   Result Value Ref Range    Magnesium 2.12 1.60 - 2.40 mg/dL   Basic Metabolic Panel   Result Value Ref Range    Glucose 79 74 - 99 mg/dL    Sodium 143 136 - 145 mmol/L    Potassium 3.4 (L) 3.5 - 5.3 mmol/L    Chloride 109 (H) 98 - 107 mmol/L    Bicarbonate 29 21 - 32 mmol/L    Anion Gap 8 (L) 10 - 20 mmol/L    Urea Nitrogen 20 6 - 23 mg/dL    Creatinine 0.89 0.50 - 1.05 mg/dL    eGFR 67 >60 mL/min/1.73m*2    Calcium 8.4 (L) 8.6 - 10.3 mg/dL     ECG 12 lead    Result Date: 9/2/2024  Normal sinus rhythm Nonspecific ST abnormality Abnormal ECG When compared with ECG of 27-APR-2021 12:29, Nonspecific T wave abnormality now evident in Lateral leads    CT head wo IV contrast    Result Date: 9/1/2024  Interpreted By:  Grecia Mcqueen, STUDY: CT HEAD WO IV CONTRAST;  9/1/2024 1:06 am   INDICATION: Signs/Symptoms:increased hallucinations.   COMPARISON: None.   ACCESSION NUMBER(S): [id number]   ORDERING CLINICIAN: DEANNE MALONE   TECHNIQUE: Axial noncontrast CT images of the head.   FINDINGS: BRAIN PARENCHYMA: Mild deep and periventricular white matter hypodensities are nonspecific, but favored to represent chronic small vessel ischemic changes.  Gray-white matter interfaces  are preserved. No mass effect or midline shift.   HEMORRHAGE: No acute intracranial hemorrhage. VENTRICLES and EXTRA-AXIAL SPACES: The ventricles and sulci are within normal limits in size for brain volume. No abnormal extraaxial fluid collection. EXTRACRANIAL SOFT TISSUES: Within normal limits. PARANASAL SINUSES/MASTOIDS:  The visualized paranasal sinuses and mastoid air cells are aerated. CALVARIUM: No depressed skull fracture. No destructive osseous lesion.   OTHER FINDINGS: None.       No acute intracranial abnormality.     MACRO: None   Signed by: Grecia Mcqueen 9/1/2024 1:35 AM Dictation workstation:   [id number]    XR chest 1 view    Result Date: 9/1/2024  Interpreted By:  Shin Rocha, STUDY: XR CHEST 1 VIEW;  8/31/2024 11:50 pm   INDICATION: Signs/Symptoms:ams.   COMPARISON: None.   ACCESSION NUMBER(S): [id number]   ORDERING CLINICIAN: DEANNE MALONE   FINDINGS:     The cardiomediastinal silhouette and pulmonary vasculature are within normal limits. There is mild prominence of peribronchovascular interstitium. No consolidation, pleural effusion or pneumothorax.         Mild prominence of the peribronchovascular interstitium that could relate to small airways disease such as asthma or bronchitis.   Report   MACRO: None.   Signed by: Shin Rocha 9/1/2024 12:38 AM Dictation workstation:   [id number]         Assessment/Plan   Assessment & Plan  Acute metabolic encephalopathy    Parkinson disease (Multi)    HTN (hypertension)    HLD (hyperlipidemia)      Plan:  - stable to remain at current level of care  - continue to treat UTI will have last day of ceftriaxone today, can change to keflex  - appreciate psychiatry consultation  - low dose seroquel at night added by psychiatry  - patient agreeable to SNF on DC, anticipate dc tomorrow AM  - family updated at bedside today     Diet: regular  VTE ppx: enoxparin  Code: FULL         I spent 45 minutes in the professional and overall care of this  patient.    Chet John MD

## 2024-09-02 NOTE — NURSING NOTE
EOS:  Slept well through the night.  Continues to have hallucinations at times but is aware that she is hallucinating.  Using call light appropriately when needing to go to the bathroom, weak but steady with standby assist.  Tolerating p.o. well.  VSS.

## 2024-09-02 NOTE — NURSING NOTE
Patient stable this shift. Continues to be a&ox2, patient forgetful at time. No hallucinations this shift. Patient continues on abx for uti, no adverse effects noted. Patient up with x 1 assist. Currently up in chair with chair alarm on and call light with in reach.

## 2024-09-02 NOTE — PROGRESS NOTES
"Jennifer Murrell is a 78 y.o. female on day 1 of admission presenting with Acute metabolic encephalopathy.    Subjective   Patient had visual hallucinations symptoms last night but she described it more like a dream of seeing children in the room.  This morning she denies any visual hallucinations.  She has been acting talking with her son and other family members.  Patient denies any depressive symptoms or suicidal thoughts.  No paranoid thoughts.  Patient is tolerating the Seroquel well       Objective     Physical Exam    Last Recorded Vitals  Blood pressure 144/75, pulse 54, temperature 35.7 °C (96.3 °F), temperature source Temporal, resp. rate 16, height 1.702 m (5' 7\"), weight 67.6 kg (149 lb 1.6 oz), SpO2 98%.  Intake/Output last 3 Shifts:  I/O last 3 completed shifts:  In: 1572.5 (23.3 mL/kg) [P.O.:600; I.V.:972.5 (14.4 mL/kg)]  Out: - (0 mL/kg)   Weight: 67.6 kg     Relevant Results                              Assessment/Plan   Assessment & Plan  Acute metabolic encephalopathy    HLD (hyperlipidemia)    HTN (hypertension)    Parkinson disease (Multi)    Continue Seroquel as ordered as needed for any breakthrough symptoms  Will continue to follow         Ba Moscoso MD      "

## 2024-09-02 NOTE — CARE PLAN
Problem: Pain - Adult  Goal: Verbalizes/displays adequate comfort level or baseline comfort level  Outcome: Met     Problem: Safety - Adult  Goal: Free from fall injury  Outcome: Met     Problem: Fall/Injury  Goal: Not fall by end of shift  Outcome: Met  Goal: Be free from injury by end of the shift  Outcome: Met  Goal: Use assistive devices by end of the shift  Outcome: Met   The patient's goals for the shift include      The clinical goals for the shift include careplan    Over the shift, the patient did make progress toward the following goals.

## 2024-09-03 ENCOUNTER — TELEPHONE (OUTPATIENT)
Dept: NEUROLOGY | Facility: CLINIC | Age: 79
End: 2024-09-03
Payer: MEDICARE

## 2024-09-03 LAB
ANION GAP SERPL CALC-SCNC: 10 MMOL/L (ref 10–20)
BUN SERPL-MCNC: 23 MG/DL (ref 6–23)
CALCIUM SERPL-MCNC: 8.4 MG/DL (ref 8.6–10.3)
CHLORIDE SERPL-SCNC: 108 MMOL/L (ref 98–107)
CO2 SERPL-SCNC: 26 MMOL/L (ref 21–32)
CREAT SERPL-MCNC: 0.84 MG/DL (ref 0.5–1.05)
EGFRCR SERPLBLD CKD-EPI 2021: 72 ML/MIN/1.73M*2
ERYTHROCYTE [DISTWIDTH] IN BLOOD BY AUTOMATED COUNT: 12.2 % (ref 11.5–14.5)
GLUCOSE SERPL-MCNC: 87 MG/DL (ref 74–99)
HCT VFR BLD AUTO: 35.1 % (ref 36–46)
HGB BLD-MCNC: 11 G/DL (ref 12–16)
MAGNESIUM SERPL-MCNC: 2.1 MG/DL (ref 1.6–2.4)
MCH RBC QN AUTO: 31.3 PG (ref 26–34)
MCHC RBC AUTO-ENTMCNC: 31.3 G/DL (ref 32–36)
MCV RBC AUTO: 100 FL (ref 80–100)
NRBC BLD-RTO: 0 /100 WBCS (ref 0–0)
PLATELET # BLD AUTO: 182 X10*3/UL (ref 150–450)
POTASSIUM SERPL-SCNC: 3.8 MMOL/L (ref 3.5–5.3)
RBC # BLD AUTO: 3.51 X10*6/UL (ref 4–5.2)
SODIUM SERPL-SCNC: 140 MMOL/L (ref 136–145)
WBC # BLD AUTO: 4.7 X10*3/UL (ref 4.4–11.3)

## 2024-09-03 PROCEDURE — 85027 COMPLETE CBC AUTOMATED: CPT | Performed by: INTERNAL MEDICINE

## 2024-09-03 PROCEDURE — 2500000001 HC RX 250 WO HCPCS SELF ADMINISTERED DRUGS (ALT 637 FOR MEDICARE OP): Performed by: INTERNAL MEDICINE

## 2024-09-03 PROCEDURE — 2500000004 HC RX 250 GENERAL PHARMACY W/ HCPCS (ALT 636 FOR OP/ED): Performed by: INTERNAL MEDICINE

## 2024-09-03 PROCEDURE — 2500000002 HC RX 250 W HCPCS SELF ADMINISTERED DRUGS (ALT 637 FOR MEDICARE OP, ALT 636 FOR OP/ED): Performed by: INTERNAL MEDICINE

## 2024-09-03 PROCEDURE — 97161 PT EVAL LOW COMPLEX 20 MIN: CPT | Mod: GP

## 2024-09-03 PROCEDURE — 80048 BASIC METABOLIC PNL TOTAL CA: CPT | Performed by: INTERNAL MEDICINE

## 2024-09-03 PROCEDURE — 99239 HOSP IP/OBS DSCHRG MGMT >30: CPT | Performed by: INTERNAL MEDICINE

## 2024-09-03 PROCEDURE — 83735 ASSAY OF MAGNESIUM: CPT | Performed by: INTERNAL MEDICINE

## 2024-09-03 PROCEDURE — 1100000001 HC PRIVATE ROOM DAILY

## 2024-09-03 PROCEDURE — 36415 COLL VENOUS BLD VENIPUNCTURE: CPT | Performed by: INTERNAL MEDICINE

## 2024-09-03 PROCEDURE — 97165 OT EVAL LOW COMPLEX 30 MIN: CPT | Mod: GO | Performed by: OCCUPATIONAL THERAPIST

## 2024-09-03 PROCEDURE — 2500000002 HC RX 250 W HCPCS SELF ADMINISTERED DRUGS (ALT 637 FOR MEDICARE OP, ALT 636 FOR OP/ED): Performed by: PSYCHIATRY & NEUROLOGY

## 2024-09-03 RX ORDER — QUETIAPINE FUMARATE 25 MG/1
25 TABLET, FILM COATED ORAL NIGHTLY
Status: ON HOLD
Start: 2024-09-03 | End: 2024-10-03

## 2024-09-03 RX ORDER — CEPHALEXIN 250 MG/1
500 CAPSULE ORAL 3 TIMES DAILY
Status: ON HOLD
Start: 2024-09-03 | End: 2024-09-07

## 2024-09-03 RX ADMIN — AMANTADINE 100 MG: 100 CAPSULE ORAL at 22:07

## 2024-09-03 RX ADMIN — CARBIDOPA AND LEVODOPA 2 TABLET: 25; 100 TABLET, EXTENDED RELEASE ORAL at 22:07

## 2024-09-03 RX ADMIN — CARBIDOPA AND LEVODOPA 2 TABLET: 25; 100 TABLET ORAL at 17:30

## 2024-09-03 RX ADMIN — POLYETHYLENE GLYCOL 3350 17 G: 17 POWDER, FOR SOLUTION ORAL at 08:25

## 2024-09-03 RX ADMIN — VENLAFAXINE HYDROCHLORIDE 150 MG: 75 CAPSULE, EXTENDED RELEASE ORAL at 06:49

## 2024-09-03 RX ADMIN — AMANTADINE 100 MG: 100 CAPSULE ORAL at 08:25

## 2024-09-03 RX ADMIN — ENOXAPARIN SODIUM 40 MG: 40 INJECTION SUBCUTANEOUS at 12:03

## 2024-09-03 RX ADMIN — PANTOPRAZOLE SODIUM 40 MG: 40 TABLET, DELAYED RELEASE ORAL at 06:49

## 2024-09-03 RX ADMIN — CEFTRIAXONE SODIUM 1 G: 1 INJECTION, SOLUTION INTRAVENOUS at 06:49

## 2024-09-03 RX ADMIN — QUETIAPINE FUMARATE 25 MG: 25 TABLET ORAL at 22:07

## 2024-09-03 RX ADMIN — CARBIDOPA AND LEVODOPA 2 TABLET: 25; 100 TABLET ORAL at 14:47

## 2024-09-03 RX ADMIN — CARBIDOPA AND LEVODOPA 2 TABLET: 25; 100 TABLET ORAL at 12:03

## 2024-09-03 RX ADMIN — CARBIDOPA AND LEVODOPA 2 TABLET: 25; 100 TABLET ORAL at 08:25

## 2024-09-03 ASSESSMENT — COGNITIVE AND FUNCTIONAL STATUS - GENERAL
MOBILITY SCORE: 16
WALKING IN HOSPITAL ROOM: A LITTLE
HELP NEEDED FOR BATHING: A LITTLE
WALKING IN HOSPITAL ROOM: A LITTLE
DRESSING REGULAR LOWER BODY CLOTHING: A LITTLE
DAILY ACTIVITIY SCORE: 21
MOVING TO AND FROM BED TO CHAIR: A LITTLE
TOILETING: A LITTLE
STANDING UP FROM CHAIR USING ARMS: A LITTLE
TURNING FROM BACK TO SIDE WHILE IN FLAT BAD: A LITTLE
MOBILITY SCORE: 20
CLIMB 3 TO 5 STEPS WITH RAILING: TOTAL
CLIMB 3 TO 5 STEPS WITH RAILING: A LOT
STANDING UP FROM CHAIR USING ARMS: A LITTLE
DAILY ACTIVITIY SCORE: 21
HELP NEEDED FOR BATHING: A LITTLE
MOVING FROM LYING ON BACK TO SITTING ON SIDE OF FLAT BED WITH BEDRAILS: A LITTLE
DRESSING REGULAR LOWER BODY CLOTHING: A LITTLE
TOILETING: A LITTLE

## 2024-09-03 ASSESSMENT — ACTIVITIES OF DAILY LIVING (ADL): ADL_ASSISTANCE: INDEPENDENT

## 2024-09-03 ASSESSMENT — PAIN SCALES - GENERAL
PAINLEVEL_OUTOF10: 0 - NO PAIN

## 2024-09-03 ASSESSMENT — PAIN - FUNCTIONAL ASSESSMENT
PAIN_FUNCTIONAL_ASSESSMENT: 0-10

## 2024-09-03 NOTE — DISCHARGE SUMMARY
Discharge Diagnosis  Acute metabolic encephalopathy    Issues Requiring Follow-Up  It was a pleasure to meet you in the hospital  You were diagnosed as having possible urinary tract infection  This was likely affecting the underlying visual hallucinations that you were reportedly have been having  You were seen while you are in the hospital by the psychiatry service who recommended a low-dose of a medication called Seroquel to be taken at night  This seems to be helping relieve some of these visual hallucinations  Despite the potential small despite the small risk of your medication amantadine, that you are taking for your Parkinson's, being a cause of hallucinations, it was not determined that this was a likely cause and this medication was continued without change  You should keep your follow-up appointment with Dr. Coppola, your Parkinson's doctor, for later on this week    Test Results Pending At Discharge  Pending Labs       No current pending labs.            Hospital Course  Jennifer Murrell is a very pleasant 78 year old lady with known Parkinson's Disease presenting with what was described as altered mental status.  Patient was ultimately diagnosed with likely UTI given UA positive for leukocyte estrace, negative nitrites, culture was negative, but she did improve.  She was also seen in conjunction with psychiatry with plan to start Seroquel 25 mg nightly; she had apparently been experiencing visual hallucinations for some time; she will be discharged to finish 4 days of keflex, maintain on nightly Seroquel, will go to SNF and follow-up with neurology as mentioned above.    Greater than 30 minutes was spent facilitating this patients discharge from the hospital which included examining the patient, reconciling medications, and making arrangements for future care.    Chet John MD  [facility]  Internal Medicine    This document was generated in whole or in part using the [employer]  voice recognition software and there may be some incorrect words/wording, spelling, or punctuation errors that were not corrected prior to finalization in the medical record.    Pertinent Physical Exam At Time of Discharge  Physical Exam  Vitals reviewed.   Constitutional:       Appearance: Normal appearance.   HENT:      Head: Normocephalic and atraumatic.      Nose: Nose normal.      Mouth/Throat:      Mouth: Mucous membranes are moist.   Eyes:      Extraocular Movements: Extraocular movements intact.      Conjunctiva/sclera: Conjunctivae normal.      Pupils: Pupils are equal, round, and reactive to light.   Cardiovascular:      Rate and Rhythm: Normal rate and regular rhythm.      Pulses: Normal pulses.      Heart sounds: Normal heart sounds.   Pulmonary:      Effort: Pulmonary effort is normal.      Breath sounds: Normal breath sounds.   Abdominal:      General: Bowel sounds are normal.      Palpations: Abdomen is soft.   Musculoskeletal:         General: Normal range of motion.      Cervical back: Normal range of motion and neck supple.   Skin:     General: Skin is warm and dry.   Neurological:      General: No focal deficit present. Pill orlling tremor noted in      Mental Status: She is alert. Mental status is at baseline.   Psychiatric:         Mood and Affect: Mood normal.         Behavior: Behavior normal.     Home Medications     Medication List      ASK your doctor about these medications     amantadine 100 mg tablet; Commonly known as: Symmetrel; Take 1 tablet   (100 mg) by mouth 2 times a day. Twice a day at 8a and noon   * carbidopa-levodopa  mg ER tablet; Commonly known as: Sinemet CR;   Take 1 tablet by mouth once daily at bedtime.   * carbidopa-levodopa  mg tablet; Commonly known as: Sinemet; Take   2 tablets by mouth 4 times a day. At 8a, 11a, 2p & 5p   cyanocobalamin (vitamin B-12) 1,000 mcg tablet extended release;   Commonly known as: Vitamin B-12   * Effexor  mg 24 hr  capsule; Generic drug: venlafaxine XR   * venlafaxine XR 75 mg 24 hr capsule; Commonly known as: Effexor-XR   oxybutynin XL 10 mg 24 hr tablet; Commonly known as: Ditropan-XL   polyethylene glycol 17 gram/dose powder; Commonly known as: Glycolax,   Miralax  * This list has 4 medication(s) that are the same as other medications   prescribed for you. Read the directions carefully, and ask your doctor or   other care provider to review them with you.       Outpatient Follow-Up  Future Appointments   Date Time Provider Department Center   9/9/2024  3:00 PM GARETH [facility] [id number] [facility]       Chet John MD    Addendum 9/4/24: No changes to discharge plan as mentioned above, patient will physically leave hospital on today's date.

## 2024-09-03 NOTE — PROGRESS NOTES
09/03/24 1800   Discharge Planning   Home or Post Acute Services Post acute facilities (Rehab/SNF/etc)   Type of Post Acute Facility Services Skilled nursing   Expected Discharge Disposition SNF     1600 Met briefly with the pt and advised her that [facility] accepted, [place] does not have a bed and the [place] has not responded.

## 2024-09-03 NOTE — CARE PLAN
The patient's goals for the shift include      The clinical goals for the shift include see care plan      Problem: Discharge Planning  Goal: Discharge to home or other facility with appropriate resources  Outcome: Progressing     Problem: Chronic Conditions and Co-morbidities  Goal: Patient's chronic conditions and co-morbidity symptoms are monitored and maintained or improved  Outcome: Progressing     Problem: Fall/Injury  Goal: Verbalize understanding of personal risk factors for fall in the hospital  Outcome: Progressing  Goal: Verbalize understanding of risk factor reduction measures to prevent injury from fall in the home  Outcome: Progressing  Goal: Pace activities to prevent fatigue by end of the shift  Outcome: Progressing     EOS note: Pt oriented x4 and very pleasant this shift. Mild hallucination reported this shift but patient was very redirectable. Ambulated to bathroom using the walker. VSS. Worked with therapy and ambulated in hallway. Dr. John had a long discussion with family at bedside. Awaiting acceptance for SNF's. Took all meds whole. Skin intact.    Pt resting at this time. Bed is in lowest position and locked with alarm on. Call light and personal possesions are within reach.

## 2024-09-03 NOTE — PROGRESS NOTES
Occupational Therapy  Evaluation    Patient Name: Jennifer Murrell  MRN: [id number]  Today's Date: 9/3/2024  Time Calculation  Start Time: 1052  Stop Time: 1105  Time Calculation (min): 13 min  3023/3023-A    Assessment  IP OT Assessment  OT Assessment: Patient demonstrates decreased independence with self care and mobility and decreased balance.  Patient will benefit from skilled OT to address deficits.  Anticipate patient will be safe to discharge to prior level of living with low intensity therapy and continued assist from family.  Prognosis: Good  Barriers to Discharge: None  Evaluation/Treatment Tolerance: Patient tolerated treatment well  Medical Staff Made Aware: Yes  End of Session Communication: Bedside nurse  End of Session Patient Position: Up in chair, Alarm on    Plan:  Treatment Interventions: ADL retraining, Functional transfer training, UE strengthening/ROM, Endurance training, Cognitive reorientation, Patient/family training  OT Frequency: 4 times per week  OT Discharge Recommendations: Low intensity level of continued care  Equipment Recommended upon Discharge: Wheeled walker  OT Recommended Transfer Status: Stand by assist, Minimal assist, Assist of 1  OT - OK to Discharge: Yes (to next level of care when medically cleared by physician)    Subjective     Current Problem:  1. Acute metabolic encephalopathy        2. Urinary tract infection associated with catheterization of urinary tract, unspecified indwelling urinary catheter type, initial encounter (CMS-HCC)        3. EULALIA (acute kidney injury) (CMS-HCC)        4. Dehydration            General:  General  Reason for Referral: impaired self care  Referred By: Dr. John  Past Medical History Relevant to Rehab: Parkinsons, dementia,HLD, HTN  Co-Treatment: PT  Co-Treatment Reason: discharge planning  Prior to Session Communication: Bedside nurse  Patient Position Received: Up in chair, Alarm on  Preferred Learning Style: verbal, visual  General  Comment: Patient agreeable to therapy.    Precautions:  Medical Precautions: Fall precautions      Pain:  Pain Assessment  Pain Assessment: 0-10  0-10 (Numeric) Pain Score: 0 - No pain    Objective     Cognition:  Orientation Level: Oriented X4        Home Living:  Type of Home: House  Lives With: Alone  Home Adaptive Equipment: Walker rolling or standard  Home Layout: Able to live on main level with bedroom/bathroom  Home Access: Stairs to enter with rails  Entrance Stairs-Number of Steps: 3-4  Bathroom Shower/Tub: Walk-in shower     Prior Function:  Level of [place]: Independent with ADLs and functional transfers  Receives Help From: Family (as needed)  Prior Function Comments: patient uses a 4WW, but reports furniture walking at home      ADL:  Toileting Assistance with Device: Stand by    Activity Tolerance:  Endurance: Tolerates 10 - 20 min exercise with multiple rests    Bed Mobility/Transfers:   Bed Mobility  Bed Mobility: No  Transfers  Transfer: Yes (sit to stand with SBA, transferred out of chair and back to chair with SBA with cues for safety with FWW.)    Ambulation/Gait Training:  Functional Mobility  Functional Mobility Performed: Yes (Patient ambulated in hallway with FWW with SBA. Cues for safety with walker)         Extremities: RUE   RUE : Within Functional Limits and LUE   LUE: Within Functional Limits    Outcome Measures: [facility] Daily Activity  Putting on and taking off regular lower body clothing: A little  Bathing (including washing, rinsing, drying): A little  Putting on and taking off regular upper body clothing: None  Toileting, which includes using toilet, bedpan or urinal: A little  Taking care of personal grooming such as brushing teeth: None  Eating Meals: None  Daily Activity - Total Score: 21           EDUCATION:  Education  Individual(s) Educated: Patient  Education Provided: Fall precautons, Risk and benefits of OT discussed with patient or other  Patient/Caregiver Demonstrated  Understanding: yes  Plan of Care Discussed and Agreed Upon: yes  Patient Response to Education: Patient/Caregiver Verbalized Understanding of Information      Goals:   Encounter Problems       Encounter Problems (Active)       OT Goals       Patient will complete functional transfers with mod I. (Progressing)       Start:  09/03/24    Expected End:  09/17/24            Patient will complete toileting with mod I. (Progressing)       Start:  09/03/24    Expected End:  09/17/24            Patient will complete LE dressing with mod I. (Progressing)       Start:  09/03/24    Expected End:  09/17/24

## 2024-09-03 NOTE — PROGRESS NOTES
Physical Therapy    Physical Therapy Evaluation    Patient Name: Jennifer Murrell  MRN: [id number]  Today's Date: 9/3/2024   Time Calculation  Start Time: 1052  Stop Time: 1102  Time Calculation (min): 10 min    Assessment/Plan   PT Assessment  PT Assessment Results: Decreased strength, Decreased range of motion, Decreased endurance, Impaired balance, Decreased mobility, Decreased safety awareness, Decreased cognition  Rehab Prognosis: Good  Evaluation/Treatment Tolerance: Patient tolerated treatment well  Medical Staff Made Aware: Yes  End of Session Communication: Bedside nurse  Assessment Comment: Pt is a 79 y/o female admitted for AMS and visual hallucinations. Pt presents with decreased strength, endurance and balance. Pt able to tolerate transfers and gait training this date, although will benefit from further strength, gait and stair training during stay in this facility. Upon discharge pt will benefit from low intensity therapy with 24 hr assist for continued improvement in functional mobility.     End of Session Patient Position: Up in chair, Alarm on (call light within reach and family present)  IP OR SWING BED PT PLAN  Inpatient or Swing Bed: Inpatient  PT Plan  Treatment/Interventions: Bed mobility, Transfer training, Gait training, Stair training, Balance training, Neuromuscular re-education, Endurance training, Strengthening, Range of motion, Therapeutic exercise, Therapeutic activity, Home exercise program, Positioning, Postural re-education  PT Plan: Ongoing PT  PT Frequency: 4 times per week  PT Discharge Recommendations: Low intensity level of continued care (with 24 hr assist)  Equipment Recommended upon Discharge: Wheeled walker  PT Recommended Transfer Status: Assist x1, Assistive device, Contact guard  PT - OK to Discharge: Yes (Once medically cleared)    Subjective   General Visit Information:  General  Reason for Referral: P/w AMS. Per pt's son she has had increased visual hallucinations  "reporting seeing people that she understands are not there.  Referred By: Dr. John  Past Medical History Relevant to Rehab: Dementia, Parkinson's disease on amantadine, HTN, HLD  Family/Caregiver Present: Yes  Caregiver Feedback: Family present who were helpful and supportive.  Co-Treatment: OT  Co-Treatment Reason: To maximize pt safety with functional mobility and discharge planning  Prior to Session Communication: Bedside nurse  Patient Position Received: Up in chair, Alarm on  Preferred Learning Style: verbal  General Comment: Pt pleasant and agreeable to work with therapy.    Home Living:  Home Living  Type of Home: House  Lives With: Alone  Home Adaptive Equipment: Walker rolling or standard (RW)  Home Layout: One level  Home Access: Stairs to enter with rails  Entrance Stairs-Number of Steps: ~3-4  Bathroom Shower/Tub: Walk-in shower  Bathroom Equipment: None  Home Living Comments: Pt reports that her three kids live in the area and help her out as needed    Prior Level of Function:  Prior Function Per Pt/Caregiver Report  Level of [place]: Independent with ADLs and functional transfers, Independent with homemaking with ambulation  Receives Help From: Family  ADL Assistance: Independent  Homemaking Assistance: Independent  Ambulatory Assistance: Independent (Mod I with RW which pt reports she only uses \"when her kids are around\" pt also furniture walks in home)  Leisure: Yoga  Prior Function Comments: (-) drives, family provides transporation, pt participates in exercise class held by  for patients with parkinson's that she completes 2-3x/week, pt reports x1 fall within the last 3 months    Precautions:  Precautions  Medical Precautions: Fall precautions    Objective   Pain:  Pain Assessment  Pain Assessment: 0-10  0-10 (Numeric) Pain Score: 0 - No pain    Cognition:  Cognition  Overall Cognitive Status: Within Functional Limits  Orientation Level: Oriented X4    General Assessments:    Activity " Tolerance  Endurance: Tolerates 10 - 20 min exercise with multiple rests    Sensation  Light Touch: No apparent deficits  Sensation Comment: Pt denies any n/t.    Static Sitting Balance  Static Sitting-Balance Support: Bilateral upper extremity supported, Feet supported  Static Sitting-Level of Assistance: Contact guard    Static Standing Balance  Static Standing-Balance Support: Bilateral upper extremity supported  Static Standing-Level of Assistance: Contact guard  Dynamic Standing Balance  Dynamic Standing-Balance Support: Bilateral upper extremity supported  Dynamic Standing-Level of Assistance: Contact guard    Functional Assessments:     Bed Mobility  Bed Mobility: No (Pt received and returned to chair)    Transfers  Transfer: Yes  Transfer 1  Transfer From 1: Stand to, Chair with arms to  Transfer to 1: Chair with arms, Stand  Technique 1: Sit to stand, Stand to sit  Transfer Device 1: Walker, Gait belt  Transfer Level of Assistance 1: Contact guard, Moderate verbal cues  Trials/Comments 1: VCs for proper hand placement, body mechanics and safety awareness with FWW.    Ambulation/Gait Training  Ambulation/Gait Training Performed: Yes  Ambulation/Gait Training 1  Surface 1: Level tile  Device 1: Rolling walker  Gait Support Devices: Gait belt  Assistance 1: Contact guard, Minimal verbal cues  Quality of Gait 1: Narrow base of support, Diminished heel strike, Inconsistent stride length, Decreased step length, Forward flexed posture, Antalgic, Scissoring (decreased jose, significant NBOS with pt almost scissoring feet at times, ER feet BL R > L, demos some instability 2/2 baseline PD)  Comments/Distance (ft) 1: ~250 ft with reciprocal gait pattern (VCs for safety awareness and gait mechanics.)    Stairs  Stairs: No (Pt will benefit from stair training.)    Extremity/Trunk Assessments:     RUE   RUE : Within Functional Limits  LUE   LUE: Within Functional Limits     Outcome Measures:  [facility] Basic  Mobility  Turning from your back to your side while in a flat bed without using bedrails: A little  Moving from lying on your back to sitting on the side of a flat bed without using bedrails: A little  Moving to and from bed to chair (including a wheelchair): A little  Standing up from a chair using your arms (e.g. wheelchair or bedside chair): A little  To walk in hospital room: A little  Climbing 3-5 steps with railing: Total  Basic Mobility - Total Score: 16    Encounter Problems       Encounter Problems (Active)       Balance       Pt will demonstrate static/dynamic standing balance for >/= 5 min SBA without evidence of instability or LOB with functional mobility tasks.         Start:  09/03/24    Expected End:  09/17/24               Mobility       Pt will be able to ambulate >/= 250 ft with LRD SBA with good safety awareness.        Start:  09/03/24    Expected End:  09/17/24            Pt will be able to negotiate ascending/descending 4 MAXWELL with use of unilateral HR SBA with sufficient foot clearance and good safety awareness for safe home going.         Start:  09/03/24    Expected End:  09/17/24            Pt will complete supine, seated and standing exercises to maintain/improve overall strength with minimal verbal cues.         Start:  09/03/24    Expected End:  09/17/24               PT Transfers       Pt will be able to complete all bed mobility tasks independently from flat HOB.        Start:  09/03/24    Expected End:  09/17/24            Pt will be able to complete all transfers with LRD mod I demonstrating good safety awareness and proper body mechanics.        Start:  09/03/24    Expected End:  09/17/24                 Education Documentation  Precautions, taught by Falguni Vides, PT at 9/3/2024  3:02 PM.  Learner: Family, Patient  Readiness: Acceptance  Method: Explanation  Response: Verbalizes Understanding, Demonstrated Understanding, Needs Reinforcement    Body Mechanics, taught by  Falguni Vides PT at 9/3/2024  3:02 PM.  Learner: Family, Patient  Readiness: Acceptance  Method: Explanation  Response: Verbalizes Understanding, Demonstrated Understanding, Needs Reinforcement    Home Exercise Program, taught by Falguni Vides PT at 9/3/2024  3:02 PM.  Learner: Family, Patient  Readiness: Acceptance  Method: Explanation  Response: Verbalizes Understanding, Demonstrated Understanding, Needs Reinforcement    Mobility Training, taught by Falguni Vides PT at 9/3/2024  3:02 PM.  Learner: Family, Patient  Readiness: Acceptance  Method: Explanation  Response: Verbalizes Understanding, Demonstrated Understanding, Needs Reinforcement    Education Comments  No comments found.

## 2024-09-03 NOTE — TELEPHONE ENCOUNTER
I called the son, reviewed his mother's admission and they expect she will be discharged to a rehab facility soon. Discussed importance of recovering from UTI, electrolyte imbalance, dehydration, constipation and decreased appetitite. Recomended FUV in person w our NP Denisa and FUV w PCP in a few weeks to follow up on UTI and electrolytes. Patient son would like to get her in to see DINESH Crawley this week if Sep 5 is still available. I let him know I'd checkwith the  or he could call [employer] to arrange.

## 2024-09-03 NOTE — CARE PLAN
The patient's goals for the shift include      The clinical goals for the shift include careplan      Problem: Discharge Planning  Goal: Discharge to home or other facility with appropriate resources  Outcome: Progressing     Problem: Chronic Conditions and Co-morbidities  Goal: Patient's chronic conditions and co-morbidity symptoms are monitored and maintained or improved  Outcome: Progressing     Problem: Fall/Injury  Goal: Verbalize understanding of personal risk factors for fall in the hospital  Outcome: Progressing  Goal: Verbalize understanding of risk factor reduction measures to prevent injury from fall in the home  Outcome: Progressing  Goal: Pace activities to prevent fatigue by end of the shift  Outcome: Progressing

## 2024-09-03 NOTE — PROGRESS NOTES
09/03/24 1645   Discharge Planning   Living Arrangements Alone   Support Systems Family members;Friends/neighbors  (lives by herself. [address]   Assistance Needed family drives her to appointments and provide her graceries. Getsprepackaged medications.   Who is requesting discharge planning? Provider   Expected Discharge Disposition SNF   Patient Choice   Provider Choice list and CMS website (https://medicare.gov/care-compare#search) for post-acute Quality and Resource Measure Data were provided and reviewed with: Patient     Introduced myself to patient . She has large amount of family support Nothing in place for 24 hours assistance.  Private hire list given to her.  SNF list reviewed with her. FOC is 1. [facility]  2 [facility] 3. [facility]. DSC to send referrals.  Discussed need for additional help in the future.

## 2024-09-03 NOTE — NURSING NOTE
No  hallucinations this shift. She has her bed alarm on for safety because she does forget to call for assist oob. She has a slightly unsteady gait,using a walker with a standby assist. She can be forgetful but reorients easily. Her call bell is in reach and safety is maintained.

## 2024-09-03 NOTE — TELEPHONE ENCOUNTER
Dallas (son) called. Suzi was admitted to hospital with UTI. Hallucinations, confusion are the major concerns. Police were called to intervene prior to hospitalization. I asked Dallas to call the office when she is discharged & UTI is resolved, we will have her see Prosper Crawley CNP for follow up.

## 2024-09-03 NOTE — TELEPHONE ENCOUNTER
Son Olegario called again. He would like to speak to you about her hospitalization. He'd like to know if she will need rehab, Assisted Living, medication changes. He would also like to know who they can call at [facility] that is a Parkinson's specialist to communicate Dr Downs medication change recommendations. I have already discussed this with him at length but he doesn't want to hear from the . She was admitted Sunday with a UTI. Please call [phone]

## 2024-09-04 VITALS
HEART RATE: 64 BPM | RESPIRATION RATE: 20 BRPM | OXYGEN SATURATION: 98 % | SYSTOLIC BLOOD PRESSURE: 140 MMHG | TEMPERATURE: 97.5 F | DIASTOLIC BLOOD PRESSURE: 86 MMHG | HEIGHT: 67 IN | WEIGHT: 149.1 LBS | BODY MASS INDEX: 23.4 KG/M2

## 2024-09-04 LAB
ANION GAP SERPL CALC-SCNC: 9 MMOL/L (ref 10–20)
BUN SERPL-MCNC: 20 MG/DL (ref 6–23)
CALCIUM SERPL-MCNC: 8.7 MG/DL (ref 8.6–10.3)
CHLORIDE SERPL-SCNC: 108 MMOL/L (ref 98–107)
CO2 SERPL-SCNC: 28 MMOL/L (ref 21–32)
CREAT SERPL-MCNC: 0.93 MG/DL (ref 0.5–1.05)
EGFRCR SERPLBLD CKD-EPI 2021: 64 ML/MIN/1.73M*2
ERYTHROCYTE [DISTWIDTH] IN BLOOD BY AUTOMATED COUNT: 12 % (ref 11.5–14.5)
GLUCOSE SERPL-MCNC: 87 MG/DL (ref 74–99)
HCT VFR BLD AUTO: 36 % (ref 36–46)
HGB BLD-MCNC: 11.2 G/DL (ref 12–16)
MAGNESIUM SERPL-MCNC: 2.03 MG/DL (ref 1.6–2.4)
MCH RBC QN AUTO: 30.7 PG (ref 26–34)
MCHC RBC AUTO-ENTMCNC: 31.1 G/DL (ref 32–36)
MCV RBC AUTO: 99 FL (ref 80–100)
NRBC BLD-RTO: 0 /100 WBCS (ref 0–0)
PLATELET # BLD AUTO: 190 X10*3/UL (ref 150–450)
POTASSIUM SERPL-SCNC: 3.5 MMOL/L (ref 3.5–5.3)
RBC # BLD AUTO: 3.65 X10*6/UL (ref 4–5.2)
SODIUM SERPL-SCNC: 141 MMOL/L (ref 136–145)
WBC # BLD AUTO: 5.3 X10*3/UL (ref 4.4–11.3)

## 2024-09-04 PROCEDURE — 97116 GAIT TRAINING THERAPY: CPT | Mod: GP,CQ

## 2024-09-04 PROCEDURE — 36415 COLL VENOUS BLD VENIPUNCTURE: CPT | Performed by: INTERNAL MEDICINE

## 2024-09-04 PROCEDURE — 2500000004 HC RX 250 GENERAL PHARMACY W/ HCPCS (ALT 636 FOR OP/ED): Performed by: INTERNAL MEDICINE

## 2024-09-04 PROCEDURE — 97110 THERAPEUTIC EXERCISES: CPT | Mod: GP,CQ

## 2024-09-04 PROCEDURE — 80048 BASIC METABOLIC PNL TOTAL CA: CPT | Performed by: INTERNAL MEDICINE

## 2024-09-04 PROCEDURE — 85027 COMPLETE CBC AUTOMATED: CPT | Performed by: INTERNAL MEDICINE

## 2024-09-04 PROCEDURE — 83735 ASSAY OF MAGNESIUM: CPT | Performed by: INTERNAL MEDICINE

## 2024-09-04 PROCEDURE — 2500000001 HC RX 250 WO HCPCS SELF ADMINISTERED DRUGS (ALT 637 FOR MEDICARE OP): Performed by: INTERNAL MEDICINE

## 2024-09-04 PROCEDURE — 2500000002 HC RX 250 W HCPCS SELF ADMINISTERED DRUGS (ALT 637 FOR MEDICARE OP, ALT 636 FOR OP/ED): Performed by: INTERNAL MEDICINE

## 2024-09-04 PROCEDURE — 97535 SELF CARE MNGMENT TRAINING: CPT | Mod: GO,CO

## 2024-09-04 RX ADMIN — CARBIDOPA AND LEVODOPA 2 TABLET: 25; 100 TABLET ORAL at 09:06

## 2024-09-04 RX ADMIN — ENOXAPARIN SODIUM 40 MG: 40 INJECTION SUBCUTANEOUS at 11:15

## 2024-09-04 RX ADMIN — CEFTRIAXONE SODIUM 1 G: 1 INJECTION, SOLUTION INTRAVENOUS at 06:17

## 2024-09-04 RX ADMIN — CARBIDOPA AND LEVODOPA 2 TABLET: 25; 100 TABLET ORAL at 11:15

## 2024-09-04 RX ADMIN — AMANTADINE 100 MG: 100 CAPSULE ORAL at 09:06

## 2024-09-04 RX ADMIN — PANTOPRAZOLE SODIUM 40 MG: 40 INJECTION, POWDER, FOR SOLUTION INTRAVENOUS at 05:25

## 2024-09-04 RX ADMIN — VENLAFAXINE HYDROCHLORIDE 150 MG: 75 CAPSULE, EXTENDED RELEASE ORAL at 06:17

## 2024-09-04 ASSESSMENT — COGNITIVE AND FUNCTIONAL STATUS - GENERAL
MOVING TO AND FROM BED TO CHAIR: A LITTLE
STANDING UP FROM CHAIR USING ARMS: A LITTLE
TURNING FROM BACK TO SIDE WHILE IN FLAT BAD: A LITTLE
CLIMB 3 TO 5 STEPS WITH RAILING: A LOT
DRESSING REGULAR LOWER BODY CLOTHING: A LITTLE
DRESSING REGULAR UPPER BODY CLOTHING: A LITTLE
PERSONAL GROOMING: A LITTLE
DAILY ACTIVITIY SCORE: 19
MOBILITY SCORE: 17
MOVING FROM LYING ON BACK TO SITTING ON SIDE OF FLAT BED WITH BEDRAILS: A LITTLE
HELP NEEDED FOR BATHING: A LITTLE
TOILETING: A LITTLE
WALKING IN HOSPITAL ROOM: A LITTLE

## 2024-09-04 ASSESSMENT — ACTIVITIES OF DAILY LIVING (ADL): HOME_MANAGEMENT_TIME_ENTRY: 27

## 2024-09-04 ASSESSMENT — PAIN - FUNCTIONAL ASSESSMENT
PAIN_FUNCTIONAL_ASSESSMENT: 0-10
PAIN_FUNCTIONAL_ASSESSMENT: 0-10

## 2024-09-04 ASSESSMENT — PAIN SCALES - GENERAL
PAINLEVEL_OUTOF10: 0 - NO PAIN
PAINLEVEL_OUTOF10: 0 - NO PAIN

## 2024-09-04 NOTE — PROGRESS NOTES
Physical Therapy    Physical Therapy Treatment    Patient Name: Jennifer Murrell  MRN: [id number]  Today's Date: 9/4/2024  Time Calculation  Start Time: 0930  Stop Time: 1000  Time Calculation (min): 30 min     3023/3023-A       09/04/24 0930   PT  Visit   PT Received On 09/04/24   Response to Previous Treatment Patient with no complaints from previous session.   General   Reason for Referral impaired self care   Referred By Dr. John   Past Medical History Relevant to Rehab Parkinsons, dementia,HLD, HTN   Prior to Session Communication Bedside nurse   Patient Position Received Bed, 3 rail up;Alarm on   Preferred Learning Style verbal;visual   Precautions   Medical Precautions Fall precautions   Pain Assessment   Pain Assessment 0-10   0-10 (Numeric) Pain Score 0 - No pain   Cognition   Overall Cognitive Status Impaired   Coordination   Movements are Fluid and Coordinated No   Postural Control   Postural Control WFL   Therapeutic Exercise   Therapeutic Exercise Performed Yes   Therapeutic Exercise Activity 1 PF/DF x15   Therapeutic Exercise Activity 2 marches x15   Therapeutic Exercise Activity 3 LAQ x15   Therapeutic Exercise Activity 4 pillow squeeze xx15   Therapeutic Exercise Activity 5 resisted hip ABD x15   Ambulation/Gait Training   Ambulation/Gait Training Performed Yes   Ambulation/Gait Training 1   Surface 1 Level tile   Device 1 Rolling walker   Gait Support Devices Gait belt   Assistance 1 Contact guard   Quality of Gait 1 NBOS;Decreased step length   Comments/Distance (ft) 1 40' x2,  scissoring gait, varied pace.   Transfers   Transfer Yes   Transfer 1   Transfer From 1 Chair with arms to   Transfer to 1 Stand   Technique 1 Sit to stand;Stand to sit   Transfer Device 1 Gait belt;Walker   Transfer Level of Assistance 1 Contact guard   Trials/Comments 1 cues for hand placement and walker placement   PT Assessment   PT Assessment Results Decreased strength;Decreased endurance;Impaired balance;Decreased  mobility   Rehab Prognosis Good   Evaluation/Treatment Tolerance Patient tolerated treatment well   End of Session Communication Bedside nurse   End of Session Patient Position Up in chair;Alarm on   Outpatient Education   Individual(s) Educated Patient   Education Provided Fall Risk;Body Mechanics;POC   Patient/Caregiver Demonstrated Understanding yes   Plan of Care Discussed and Agreed Upon yes   Patient Response to Education Patient/Caregiver Verbalized Understanding of Information   PT Plan   Inpatient/Swing Bed or Outpatient Inpatient   PT Plan   Treatment/Interventions Transfer training;Gait training;Therapeutic exercise   PT Plan Ongoing PT   PT Frequency 4 times per week   PT Discharge Recommendations Low intensity level of continued care   Equipment Recommended upon Discharge Wheeled walker   PT Recommended Transfer Status Contact guard         Outcome Measures:  [facility] Basic Mobility  Turning from your back to your side while in a flat bed without using bedrails: A little  Moving from lying on your back to sitting on the side of a flat bed without using bedrails: A little  Moving to and from bed to chair (including a wheelchair): A little  Standing up from a chair using your arms (e.g. wheelchair or bedside chair): A little  To walk in hospital room: A little  Climbing 3-5 steps with railing: A lot  Basic Mobility - Total Score: 17                 EDUCATION:  Outpatient Education  Individual(s) Educated: Patient  Education Provided: Fall Risk, Body Mechanics, POC  Patient/Caregiver Demonstrated Understanding: yes  Plan of Care Discussed and Agreed Upon: yes  Patient Response to Education: Patient/Caregiver Verbalized Understanding of Information      GOALS:  Encounter Problems       Encounter Problems (Active)       Balance       Pt will demonstrate static/dynamic standing balance for >/= 5 min SBA without evidence of instability or LOB with functional mobility tasks.         Start:  09/03/24    Expected  End:  09/17/24               Mobility       Pt will be able to ambulate >/= 250 ft with LRD SBA with good safety awareness.        Start:  09/03/24    Expected End:  09/17/24            Pt will be able to negotiate ascending/descending 4 MAXWELL with use of unilateral HR SBA with sufficient foot clearance and good safety awareness for safe home going.         Start:  09/03/24    Expected End:  09/17/24            Pt will complete supine, seated and standing exercises to maintain/improve overall strength with minimal verbal cues.         Start:  09/03/24    Expected End:  09/17/24               PT Transfers       Pt will be able to complete all bed mobility tasks independently from flat HOB.        Start:  09/03/24    Expected End:  09/17/24            Pt will be able to complete all transfers with LRD mod I demonstrating good safety awareness and proper body mechanics.        Start:  09/03/24    Expected End:  09/17/24               Pain - Adult

## 2024-09-04 NOTE — CARE PLAN
The patient's goals for the shift include      The clinical goals for the shift include see care plan      Problem: Chronic Conditions and Co-morbidities  Goal: Patient's chronic conditions and co-morbidity symptoms are monitored and maintained or improved  Outcome: Progressing     Problem: Fall/Injury  Goal: Verbalize understanding of personal risk factors for fall in the hospital  Outcome: Progressing  Goal: Verbalize understanding of risk factor reduction measures to prevent injury from fall in the home  Outcome: Progressing  Goal: Pace activities to prevent fatigue by end of the shift  Outcome: Progressing

## 2024-09-04 NOTE — PROGRESS NOTES
09/04/24 0914   Discharge Planning   Home or Post Acute Services Post acute facilities (Rehab/SNF/etc)   Type of Post Acute Facility Services Skilled nursing   Expected Discharge Disposition SNF   Does the patient need discharge transport arranged? Yes   RoundTrip coordination needed? Yes     Met with patient at bedside to update that both [facility] and [facility] can accept patient (both will have a bed available later this afternoon). Patient states she prefers to call her son who is a  to discuss FOC as he has been in both facilities. Care transitions team to follow up with patient for FOC and arrange transport as patient is medically ready to d/c per MD.     1034- Met with patient at bedside. She spoke with her son and confirms that her SNF FOC is [facility]. Request sent to [facility] to complete 7000, send d/c orders and arrange transport for 1pm (as they will have a bed available after 1pm).     1054- Transport confirmed for 1pm. Nursing and facility notified. Called to update patient's son Olegario per patient request. Olegario will update his brother Alek.

## 2024-09-04 NOTE — PROGRESS NOTES
Occupational Therapy    OT Treatment    Patient Name: Jennifer Murrell  MRN: [id number]  Today's Date: 9/4/2024  Time Calculation  Start Time: 1045  Stop Time: 1112  Time Calculation (min): 27 min       3023/3023-A    Assessment:  End of Session Communication: Bedside nurse  End of Session Patient Position: Up in chair, Alarm on       Plan:  OT Frequency: 4 times per week  OT Discharge Recommendations: Low intensity level of continued care     Subjective        09/04/24 1045   OT Last Visit   OT Received On 09/04/24   General   Prior to Session Communication Bedside nurse   Patient Position Received Up in chair;Alarm on   Preferred Learning Style verbal;visual   Pain Assessment   Pain Assessment 0-10   0-10 (Numeric) Pain Score 0 - No pain   Grooming   Grooming Level of Assistance Contact guard   Grooming Where Assessed Standing sinkside   Transfers   Transfer Yes   Transfer 1   Transfer From 1 Sit to   Transfer to 1 Stand   Technique 1 Sit to stand;Stand to sit   Transfer Device 1 Gait belt;Walker   Transfer Level of Assistance 1 Contact guard   Trials/Comments 1 FM performed within household distances CGA.   Dynamic Standing Balance   Dynamic Standing-Level of Assistance Contact guard   Dynamic Standing-Balance Reaching for objects;Reaching across midline   IP OT Assessment   End of Session Communication Bedside nurse   End of Session Patient Position Up in chair;Alarm on   Inpatient Plan   OT Frequency 4 times per week   OT Discharge Recommendations Low intensity level of continued care       Outcome Measures:[facility] Daily Activity  Putting on and taking off regular lower body clothing: A little  Bathing (including washing, rinsing, drying): A little  Putting on and taking off regular upper body clothing: A little  Toileting, which includes using toilet, bedpan or urinal: A little  Taking care of personal grooming such as brushing teeth: A little  Eating Meals: None  Daily Activity - Total Score: 19  Education  Documentation  Body Mechanics, taught by LEANDRO Madrid at 9/4/2024  4:07 PM.  Learner: Patient  Readiness: Acceptance  Method: Explanation, Demonstration  Response: Verbalizes Understanding, Demonstrated Understanding, Needs Reinforcement    Precautions, taught by LEANDRO Madrid at 9/4/2024  4:07 PM.  Learner: Patient  Readiness: Acceptance  Method: Explanation, Demonstration  Response: Verbalizes Understanding, Demonstrated Understanding, Needs Reinforcement    ADL Training, taught by LEANDRO Madrdi at 9/4/2024  4:07 PM.  Learner: Patient  Readiness: Acceptance  Method: Explanation, Demonstration  Response: Verbalizes Understanding, Demonstrated Understanding, Needs Reinforcement    Education Comments  No comments found.            Goals:  Encounter Problems       Encounter Problems (Resolved)       OT Goals       Patient will complete functional transfers with mod I. (Adequate for Discharge)       Start:  09/03/24    Expected End:  09/17/24    Resolved:  09/04/24         Patient will complete toileting with mod I. (Adequate for Discharge)       Start:  09/03/24    Expected End:  09/17/24    Resolved:  09/04/24         Patient will complete LE dressing with mod I. (Adequate for Discharge)       Start:  09/03/24    Expected End:  09/17/24    Resolved:  09/04/24

## 2024-09-05 ENCOUNTER — NURSING HOME VISIT (OUTPATIENT)
Dept: POST ACUTE CARE | Facility: EXTERNAL LOCATION | Age: 79
End: 2024-09-05
Payer: MEDICARE

## 2024-09-05 ENCOUNTER — APPOINTMENT (OUTPATIENT)
Dept: NEUROLOGY | Facility: HOSPITAL | Age: 79
End: 2024-09-05
Payer: MEDICARE

## 2024-09-05 VITALS
OXYGEN SATURATION: 97 % | RESPIRATION RATE: 20 BRPM | HEART RATE: 66 BPM | BODY MASS INDEX: 23.49 KG/M2 | SYSTOLIC BLOOD PRESSURE: 125 MMHG | TEMPERATURE: 97.3 F | DIASTOLIC BLOOD PRESSURE: 80 MMHG | WEIGHT: 150 LBS

## 2024-09-05 DIAGNOSIS — R26.9 ABNORMALITY OF GAIT: ICD-10-CM

## 2024-09-05 DIAGNOSIS — G20.A1 PARKINSON'S DISEASE, UNSPECIFIED WHETHER DYSKINESIA PRESENT, UNSPECIFIED WHETHER MANIFESTATIONS FLUCTUATE (MULTI): ICD-10-CM

## 2024-09-05 DIAGNOSIS — N30.00 ACUTE CYSTITIS WITHOUT HEMATURIA: Primary | ICD-10-CM

## 2024-09-05 DIAGNOSIS — R44.3 HALLUCINATION: ICD-10-CM

## 2024-09-05 DIAGNOSIS — I10 PRIMARY HYPERTENSION: ICD-10-CM

## 2024-09-05 LAB
ATRIAL RATE: 72 BPM
P AXIS: 79 DEGREES
P OFFSET: 186 MS
P ONSET: 150 MS
PR INTERVAL: 144 MS
Q ONSET: 222 MS
QRS COUNT: 12 BEATS
QRS DURATION: 80 MS
QT INTERVAL: 402 MS
QTC CALCULATION(BAZETT): 440 MS
QTC FREDERICIA: 427 MS
R AXIS: 71 DEGREES
T AXIS: 83 DEGREES
T OFFSET: 423 MS
VENTRICULAR RATE: 72 BPM

## 2024-09-05 PROCEDURE — 99310 SBSQ NF CARE HIGH MDM 45: CPT | Performed by: NURSE PRACTITIONER

## 2024-09-05 ASSESSMENT — ENCOUNTER SYMPTOMS
FEVER: 0
ABDOMINAL PAIN: 0
NAUSEA: 0
CHILLS: 0
DIARRHEA: 0
DIFFICULTY URINATING: 0
COUGH: 0
VOMITING: 0
PALPITATIONS: 0
CONSTIPATION: 0
SHORTNESS OF BREATH: 0
FATIGUE: 0

## 2024-09-05 NOTE — ASSESSMENT & PLAN NOTE
Urine culture negative for growth,  she presented with change in mental status and was started on atb and seroquel, she clinically improved with meds, she denies any urinary sx when asked.   Staff to monitor and notify for any recurrence of symptoms.

## 2024-09-05 NOTE — ASSESSMENT & PLAN NOTE
"Started on seroquel, was having hallucinations, she states she has had no hallucinations in the last few days, states \"I feel better than I have in a long time\".   She follows with Dr Coppola for PD.   "

## 2024-09-05 NOTE — PROGRESS NOTES
Subjective   Jennifer Murrell is a 78 y.o. female Here for skilled admission following hospitalization due to mental status changes.   HPI   She developed increased hallucinations, forgetfulness and confusion noted by family and brought in for evaluation.  She was treated for UTI, urine culture negative for growth, denies any urinary sx when asked.  She was evaluated by psychiatry and started on seroquel, hallucinations have resolved.  She states she is feeling better than she has in years.  Past medical hx includes PD follows with Dr Coppola, HTN not on meds, HLD.  She resides in a ranch style home alone, family lives nearby and assists with shopping and driving.  She is alert and oriented times 3, per notes family has noted short term memory issues.    She denies any complaints when asked.  She is a retired .      Labs:  9/4  WBC: 5.3  Hgb: 11.2  Hct: 36.0  Platelet: 190    Na: 141  K: 3.5  Cl: 108  Co2: 28  BUN: 20  Creatinine: 0.93  GFR: 64    Magnesium 2.03    MEDS:  Amantadine  Sinemet  Keflex through 9/8  B12  Effexor  Oxybutynin  Miralax  Seroquel (new)      Review of Systems   Constitutional:  Negative for chills, fatigue and fever.   Respiratory:  Negative for cough and shortness of breath.    Cardiovascular:  Negative for chest pain and palpitations.   Gastrointestinal:  Negative for abdominal pain, constipation, diarrhea, nausea and vomiting.   Genitourinary:  Negative for difficulty urinating.       Objective   /80   Pulse 66   Temp 36.3 °C (97.3 °F)   Resp 20   Wt 68 kg (150 lb)   SpO2 97%   BMI 23.49 kg/m²     Physical Exam  Constitutional:       General: She is not in acute distress.     Comments: Bradykinesia, slight masked facies.    HENT:      Head: Normocephalic and atraumatic.   Eyes:      Conjunctiva/sclera: Conjunctivae normal.   Cardiovascular:      Rate and Rhythm: Normal rate and regular rhythm.   Pulmonary:      Effort: Pulmonary effort is normal. No respiratory  "distress.      Breath sounds: Normal breath sounds.   Abdominal:      General: Bowel sounds are normal. There is no distension.      Palpations: Abdomen is soft.      Tenderness: There is no abdominal tenderness.   Musculoskeletal:         General: Normal range of motion.      Right lower leg: No edema.      Left lower leg: No edema.   Skin:     General: Skin is warm and dry.   Neurological:      General: No focal deficit present.      Mental Status: She is alert and oriented to person, place, and time.   Psychiatric:         Mood and Affect: Mood normal.         Behavior: Behavior normal.         Assessment/Plan   Problem List Items Addressed This Visit       Abnormality of gait     Will intermittently use walker,  she resides in 1 story ran home.   continue with therapy as able.           HTN (hypertension)     Not currently on meds, BP was elevated on admission, repeat normal.  Continue to monitor and intiate meds based on clinical course.          Parkinson disease (Multi)     On sinemet, amantadine.  She follows with Dr Coppola.           Acute cystitis without hematuria - Primary     Urine culture negative for growth,  she presented with change in mental status and was started on atb and seroquel, she clinically improved with meds, she denies any urinary sx when asked.   Staff to monitor and notify for any recurrence of symptoms.           Hallucination     Started on seroquel, was having hallucinations, she states she has had no hallucinations in the last few days, states \"I feel better than I have in a long time\".   She follows with Dr Coppola for PD.         labs/meds/orders reviewed  staff to monitor and notify for any changes.  Hospital records reviewed.  Follow up with neurology as scheduled  Staff to monitor and notify for any recurrence of hallucinations  Ss for discharge planning, she resides alone in a 1 story home, family has concerns regarding some short term memory issues, A&O x3 today.   Time " for coordination of care was greater than 35 minutes with hospital chart review, visit and exam, discussion of treatment plan with patient and also discussion of case with staff.

## 2024-09-05 NOTE — LETTER
Patient: Jennifer Murrell  : 1945    Encounter Date: 2024    Subjective  Jennifer Murrell is a 78 y.o. female Here for skilled admission following hospitalization due to mental status changes.   HPI   She developed increased hallucinations, forgetfulness and confusion noted by family and brought in for evaluation.  She was treated for UTI, urine culture negative for growth, denies any urinary sx when asked.  She was evaluated by psychiatry and started on seroquel, hallucinations have resolved.  She states she is feeling better than she has in years.  Past medical hx includes PD follows with Dr Coppola, HTN not on meds, HLD.  She resides in a ranch style home alone, family lives nearby and assists with shopping and driving.  She is alert and oriented times 3, per notes family has noted short term memory issues.    She denies any complaints when asked.  She is a retired .      Labs:    WBC: 5.3  Hgb: 11.2  Hct: 36.0  Platelet: 190    Na: 141  K: 3.5  Cl: 108  Co2: 28  BUN: 20  Creatinine: 0.93  GFR: 64    Magnesium 2.03    MEDS:  Amantadine  Sinemet  Keflex through   B12  Effexor  Oxybutynin  Miralax  Seroquel (new)      Review of Systems   Constitutional:  Negative for chills, fatigue and fever.   Respiratory:  Negative for cough and shortness of breath.    Cardiovascular:  Negative for chest pain and palpitations.   Gastrointestinal:  Negative for abdominal pain, constipation, diarrhea, nausea and vomiting.   Genitourinary:  Negative for difficulty urinating.       Objective  /80   Pulse 66   Temp 36.3 °C (97.3 °F)   Resp 20   Wt 68 kg (150 lb)   SpO2 97%   BMI 23.49 kg/m²     Physical Exam  Constitutional:       General: She is not in acute distress.     Comments: Bradykinesia, slight masked facies.    HENT:      Head: Normocephalic and atraumatic.   Eyes:      Conjunctiva/sclera: Conjunctivae normal.   Cardiovascular:      Rate and Rhythm: Normal rate and regular  "rhythm.   Pulmonary:      Effort: Pulmonary effort is normal. No respiratory distress.      Breath sounds: Normal breath sounds.   Abdominal:      General: Bowel sounds are normal. There is no distension.      Palpations: Abdomen is soft.      Tenderness: There is no abdominal tenderness.   Musculoskeletal:         General: Normal range of motion.      Right lower leg: No edema.      Left lower leg: No edema.   Skin:     General: Skin is warm and dry.   Neurological:      General: No focal deficit present.      Mental Status: She is alert and oriented to person, place, and time.   Psychiatric:         Mood and Affect: Mood normal.         Behavior: Behavior normal.         Assessment/Plan  Problem List Items Addressed This Visit       Abnormality of gait     Will intermittently use walker,  she resides in 1 story ran home.   continue with therapy as able.           HTN (hypertension)     Not currently on meds, BP was elevated on admission, repeat normal.  Continue to monitor and intiate meds based on clinical course.          Parkinson disease (Multi)     On sinemet, amantadine.  She follows with Dr Coppola.           Acute cystitis without hematuria - Primary     Urine culture negative for growth,  she presented with change in mental status and was started on atb and seroquel, she clinically improved with meds, she denies any urinary sx when asked.   Staff to monitor and notify for any recurrence of symptoms.           Hallucination     Started on seroquel, was having hallucinations, she states she has had no hallucinations in the last few days, states \"I feel better than I have in a long time\".   She follows with Dr Coppola for PD.         labs/meds/orders reviewed  staff to monitor and notify for any changes.  Hospital records reviewed.  Follow up with neurology as scheduled  Staff to monitor and notify for any recurrence of hallucinations  Ss for discharge planning, she resides alone in a 1 story home, family " has concerns regarding some short term memory issues, A&O x3 today.   Time for coordination of care was greater than 35 minutes with hospital chart review, visit and exam, discussion of treatment plan with patient and also discussion of case with staff.        Electronically Signed By: TWIN Teran   9/5/24 10:30 AM

## 2024-09-05 NOTE — ASSESSMENT & PLAN NOTE
Will intermittently use walker,  she resides in 1 story ranch home.   continue with therapy as able.

## 2024-09-05 NOTE — ASSESSMENT & PLAN NOTE
Not currently on meds, BP was elevated on admission, repeat normal.  Continue to monitor and intiate meds based on clinical course.

## 2024-09-06 ENCOUNTER — NURSING HOME VISIT (OUTPATIENT)
Dept: POST ACUTE CARE | Facility: EXTERNAL LOCATION | Age: 79
End: 2024-09-06
Payer: MEDICARE

## 2024-09-06 VITALS
WEIGHT: 150 LBS | SYSTOLIC BLOOD PRESSURE: 106 MMHG | DIASTOLIC BLOOD PRESSURE: 66 MMHG | RESPIRATION RATE: 18 BRPM | HEART RATE: 60 BPM | OXYGEN SATURATION: 97 % | TEMPERATURE: 97.6 F | BODY MASS INDEX: 23.49 KG/M2

## 2024-09-06 DIAGNOSIS — G20.A1 PARKINSON'S DISEASE, UNSPECIFIED WHETHER DYSKINESIA PRESENT, UNSPECIFIED WHETHER MANIFESTATIONS FLUCTUATE (MULTI): ICD-10-CM

## 2024-09-06 DIAGNOSIS — G93.41 ACUTE METABOLIC ENCEPHALOPATHY: ICD-10-CM

## 2024-09-06 DIAGNOSIS — R44.3 HALLUCINATION: ICD-10-CM

## 2024-09-06 DIAGNOSIS — N30.00 ACUTE CYSTITIS WITHOUT HEMATURIA: Primary | ICD-10-CM

## 2024-09-06 DIAGNOSIS — I10 PRIMARY HYPERTENSION: ICD-10-CM

## 2024-09-06 PROCEDURE — 99306 1ST NF CARE HIGH MDM 50: CPT | Performed by: INTERNAL MEDICINE

## 2024-09-06 NOTE — PROGRESS NOTES
Subjective   Patient ID: Jennifer Murrell is a 78 y.o. female who is acute skilled care being seen and evaluated for multiple medical problems.    HPI   Jennifer Murrell is a 78 y.o. female Here for skilled admission following hospitalization due to mental status changes.   HPI   She developed increased hallucinations, forgetfulness and confusion noted by family and brought in for evaluation.  She was treated for UTI, urine culture negative for growth, denies any urinary sx when asked.  She was evaluated by psychiatry and started on seroquel, hallucinations have resolved.  She states she is feeling better than she has in years.  Past medical hx includes PD follows with Dr Coppola, HTN not on meds, HLD.  She resides in a ranch style home alone, family lives nearby and assists with shopping and driving.  She is alert and oriented times 3, per notes family has noted short term memory issues.    She denies any complaints when asked.  She is a retired .    The patient is resting comfortably today in no distress whatsoever.  She is here to rehabilitate and is anxious to do so.  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     Labs:  9/4  WBC: 5.3  Hgb: 11.2  Hct: 36.0  Platelet: 190     Na: 141  K: 3.5  Cl: 108  Co2: 28  BUN: 20  Creatinine: 0.93  GFR: 64     Magnesium 2.03     MEDS:  Amantadine  Sinemet  Keflex through 9/8  B12  Effexor  Oxybutynin  Miralax  Seroquel (new)     Review of Systems   Constitutional:  Negative for chills, fatigue and fever.   Respiratory:  Negative for cough and shortness of breath.    Cardiovascular:  Negative for chest pain and palpitations.   Gastrointestinal:  Negative for abdominal pain, constipation, diarrhea, nausea and vomiting.   Genitourinary:  Negative for difficulty urinating.       Objective   /66   Pulse 60   Temp 36.4 °C (97.6 °F)   Resp 18   Wt 68 kg (150 lb)   SpO2 97%   BMI 23.49 kg/m²     Physical  Exam  Constitutional:       General: She is not in acute distress.     Comments: Bradykinesia, slight masked facies.    HENT:      Head: Normocephalic and atraumatic.      Mouth/Throat:      Mouth: Mucous membranes are moist.   Eyes:      Conjunctiva/sclera: Conjunctivae normal.   Cardiovascular:      Rate and Rhythm: Normal rate and regular rhythm.   Pulmonary:      Effort: Pulmonary effort is normal. No respiratory distress.      Breath sounds: Normal breath sounds.   Abdominal:      General: Bowel sounds are normal. There is no distension.      Palpations: Abdomen is soft.      Tenderness: There is no abdominal tenderness.   Musculoskeletal:         General: Normal range of motion.      Right lower leg: No edema.      Left lower leg: No edema.   Skin:     General: Skin is warm and dry.   Neurological:      General: No focal deficit present.      Mental Status: She is alert and oriented to person, place, and time.   Psychiatric:         Mood and Affect: Mood normal.         Behavior: Behavior normal.         Assessment/Plan   Problem List Items Addressed This Visit             ICD-10-CM    HTN (hypertension) I10    Parkinson disease (Multi) G20.A1    Acute metabolic encephalopathy G93.41    Acute cystitis without hematuria - Primary N30.00    Hallucination R44.3       8.  We will continue with rehabilitative restorative and supportive care as the patient tolerates    B.  Discharge disposition will be pending response to rehabilitation overall assessment of patient safety awareness    C.  The patient's lab work will be monitored on an ongoing as-needed basis should she remain here long-term care.    D.  Patient is expected to rehabilitate and be discharged back to home for close outpatient follow-up with her regular primary care physician and her neurologist.    E.  The patient's prognosis is guarded.

## 2024-09-06 NOTE — LETTER
Patient: Jennifer Murrell  : 1945    Encounter Date: 2024    Subjective  Patient ID: Jennifer Murrell is a 78 y.o. female who is acute skilled care being seen and evaluated for multiple medical problems.    HPI   Jennifer Murrell is a 78 y.o. female Here for skilled admission following hospitalization due to mental status changes.   HPI   She developed increased hallucinations, forgetfulness and confusion noted by family and brought in for evaluation.  She was treated for UTI, urine culture negative for growth, denies any urinary sx when asked.  She was evaluated by psychiatry and started on seroquel, hallucinations have resolved.  She states she is feeling better than she has in years.  Past medical hx includes PD follows with Dr Copopla, HTN not on meds, HLD.  She resides in a ranch style home alone, family lives nearby and assists with shopping and driving.  She is alert and oriented times 3, per notes family has noted short term memory issues.    She denies any complaints when asked.  She is a retired .    The patient is resting comfortably today in no distress whatsoever.  She is here to rehabilitate and is anxious to do so.  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     Labs:    WBC: 5.3  Hgb: 11.2  Hct: 36.0  Platelet: 190     Na: 141  K: 3.5  Cl: 108  Co2: 28  BUN: 20  Creatinine: 0.93  GFR: 64     Magnesium 2.03     MEDS:  Amantadine  Sinemet  Keflex through   B12  Effexor  Oxybutynin  Miralax  Seroquel (new)     Review of Systems   Constitutional:  Negative for chills, fatigue and fever.   Respiratory:  Negative for cough and shortness of breath.    Cardiovascular:  Negative for chest pain and palpitations.   Gastrointestinal:  Negative for abdominal pain, constipation, diarrhea, nausea and vomiting.   Genitourinary:  Negative for difficulty urinating.       Objective  /66   Pulse 60   Temp 36.4 °C (97.6 °F)   Resp  18   Wt 68 kg (150 lb)   SpO2 97%   BMI 23.49 kg/m²     Physical Exam  Constitutional:       General: She is not in acute distress.     Comments: Bradykinesia, slight masked facies.    HENT:      Head: Normocephalic and atraumatic.      Mouth/Throat:      Mouth: Mucous membranes are moist.   Eyes:      Conjunctiva/sclera: Conjunctivae normal.   Cardiovascular:      Rate and Rhythm: Normal rate and regular rhythm.   Pulmonary:      Effort: Pulmonary effort is normal. No respiratory distress.      Breath sounds: Normal breath sounds.   Abdominal:      General: Bowel sounds are normal. There is no distension.      Palpations: Abdomen is soft.      Tenderness: There is no abdominal tenderness.   Musculoskeletal:         General: Normal range of motion.      Right lower leg: No edema.      Left lower leg: No edema.   Skin:     General: Skin is warm and dry.   Neurological:      General: No focal deficit present.      Mental Status: She is alert and oriented to person, place, and time.   Psychiatric:         Mood and Affect: Mood normal.         Behavior: Behavior normal.         Assessment/Plan  Problem List Items Addressed This Visit             ICD-10-CM    HTN (hypertension) I10    Parkinson disease (Multi) G20.A1    Acute metabolic encephalopathy G93.41    Acute cystitis without hematuria - Primary N30.00    Hallucination R44.3       8.  We will continue with rehabilitative restorative and supportive care as the patient tolerates    B.  Discharge disposition will be pending response to rehabilitation overall assessment of patient safety awareness    C.  The patient's lab work will be monitored on an ongoing as-needed basis should she remain here long-term care.    D.  Patient is expected to rehabilitate and be discharged back to home for close outpatient follow-up with her regular primary care physician and her neurologist.    E.  The patient's prognosis is guarded.      Electronically Signed By: Alden BALLESTEROS  MD Mazin   9/8/24  8:45 PM

## 2024-09-08 ASSESSMENT — ENCOUNTER SYMPTOMS
FATIGUE: 0
ABDOMINAL PAIN: 0
PALPITATIONS: 0
COUGH: 0
SHORTNESS OF BREATH: 0
NAUSEA: 0
VOMITING: 0
CHILLS: 0
CONSTIPATION: 0
DIFFICULTY URINATING: 0
DIARRHEA: 0
FEVER: 0

## 2024-09-09 ENCOUNTER — APPOINTMENT (OUTPATIENT)
Dept: NEUROLOGY | Facility: CLINIC | Age: 79
End: 2024-09-09
Payer: MEDICARE

## 2024-09-09 VITALS — RESPIRATION RATE: 18 BRPM | SYSTOLIC BLOOD PRESSURE: 147 MMHG | HEART RATE: 69 BPM | DIASTOLIC BLOOD PRESSURE: 68 MMHG

## 2024-09-09 DIAGNOSIS — G20.B1 PARKINSON'S DISEASE WITH DYSKINESIA WITHOUT FLUCTUATING MANIFESTATIONS (MULTI): Primary | ICD-10-CM

## 2024-09-09 DIAGNOSIS — F06.8 PSYCHOSIS DUE TO PARKINSON'S DISEASE (MULTI): ICD-10-CM

## 2024-09-09 DIAGNOSIS — G20.A1 PSYCHOSIS DUE TO PARKINSON'S DISEASE (MULTI): ICD-10-CM

## 2024-09-09 DIAGNOSIS — E46 PROTEIN-CALORIE MALNUTRITION, UNSPECIFIED SEVERITY (MULTI): ICD-10-CM

## 2024-09-09 DIAGNOSIS — R41.89 COGNITIVE DECLINE: ICD-10-CM

## 2024-09-09 PROCEDURE — 3078F DIAST BP <80 MM HG: CPT | Performed by: NURSE PRACTITIONER

## 2024-09-09 PROCEDURE — 3077F SYST BP >= 140 MM HG: CPT | Performed by: NURSE PRACTITIONER

## 2024-09-09 PROCEDURE — 99214 OFFICE O/P EST MOD 30 MIN: CPT | Performed by: NURSE PRACTITIONER

## 2024-09-09 PROCEDURE — 1111F DSCHRG MED/CURRENT MED MERGE: CPT | Performed by: NURSE PRACTITIONER

## 2024-09-09 PROCEDURE — 1159F MED LIST DOCD IN RCRD: CPT | Performed by: NURSE PRACTITIONER

## 2024-09-09 PROCEDURE — 1160F RVW MEDS BY RX/DR IN RCRD: CPT | Performed by: NURSE PRACTITIONER

## 2024-09-09 PROCEDURE — 1036F TOBACCO NON-USER: CPT | Performed by: NURSE PRACTITIONER

## 2024-09-09 ASSESSMENT — UNIFIED PARKINSONS DISEASE RATING SCALE (UPDRS)
LEG_AGILITY_LEFT: 2
MINUTES_SINCE_LEVODOPA: ???
AMPLITUDE_RUE: 1
PARKINSONS_MEDS: YES
MOVEMENTS_INTERFERE_WITH_RATINGS: NO
AMPLITUDE_RLE: 0
KINETIC_TREMOR_RIGHTHAND: 1
DYSKINESIAS_PRESENT: YES
TOTAL_SCORE: 43
AMPLITUDE_LUE: 1
SPEECH: 1
POSTURE: 2
PRONATION_SUPINATION_RIGHT: 3
HANDMOVEMENTS_RIGHT: 2
POSTURAL_STABILITY: 3
TOETAPPING_RIGHT: 2
FREEZING_GAIT: 0
RIGIDITY_RUE: 0
RIGIDITY_LUE: 1
PRONATION_SUPINATION_LEFT: 3
FINGER_TAPPING_RIGHT: 1
LEG_AGILITY_RIGHT: 1
RIGIDITY_RLE: 0
LEVODOPA: YES
AMPLITUDE_LIP_JAW: 0
TOETAPPING_LEFT: 2
CONSTANCY_TREMOR_ATREST: 0
AMPLITUDE_LLE: 0
GAIT: 3
CHAIR_RISING_SCALE: 2
CLINICAL_STATE: ON
POSTURAL_TREMOR_RIGHTHAND: 1
FINGER_TAPPING_LEFT: 2
FACIAL_EXPRESSION: 2
KINETIC_TREMOR_LEFTHAND: 1
RIGIDITY_NECK: 0
RIGIDITY_LLE: 0
POSTURAL_TREMOR_LEFTHAND: 1
SPONTANEITY_OF_MOVEMENT: 2

## 2024-09-09 ASSESSMENT — PATIENT HEALTH QUESTIONNAIRE - PHQ9
2. FEELING DOWN, DEPRESSED OR HOPELESS: NOT AT ALL
1. LITTLE INTEREST OR PLEASURE IN DOING THINGS: NOT AT ALL
SUM OF ALL RESPONSES TO PHQ9 QUESTIONS 1 AND 2: 0

## 2024-09-09 ASSESSMENT — ENCOUNTER SYMPTOMS
OCCASIONAL FEELINGS OF UNSTEADINESS: 1
LOSS OF SENSATION IN FEET: 0
DEPRESSION: 0

## 2024-09-09 NOTE — PATIENT INSTRUCTIONS
#Continue meds as below BUT PLEASE VERIFY TIMES ARE CORRECT- getting meds on time is important for motor symptom control--1hr before/after does not apply here    Med Dose Time   Carbidopa-levodopa 25/100mg 2 tabs 4 times a day [id number]-5pm   Carbidopa-levodopa 50/200mg 1 tab at bedtime Bedtime/ 8pm       #If not already done, please have primary team at facility check on again having sudden worsening of hallucinations/delusions    Check UA, labs    #Decrease amantadine to 100mg daily x 1 week then STOP    Monitor dyskinesia (wiggly movements), if worsens we can try Rytary vs Crexont    #Would recommend primary team at facility consider stopping oxybutynin as can also worsen confusion and potentially hallucinations as she was not on this per pre-hospital med list    #If hallucinations/delusions are still an issue we can increase the Seroquel    Potential side effects include but are not limited to sedation, tachycardia (fast heart rate), agitation, dizziness, dry mouth, prolonged QTc interval (arrythmia)  Not to be used if history of cardiac arrhythmia. If these or any other side effects occur, please contact my office.   Discussed increased all cause morbidity and mortality including stroke or MI     #Follow up Oct 18 at 1130 am for virtual visit with me      Prosper Crawley NP-C  Adult/Gerontological Nurse Practitioner   [facility], Department of Neurology  [facility] [place]  [facility]  [address] [place] [address]  Phone: [phone]  Fax: [phone]

## 2024-09-09 NOTE — PROGRESS NOTES
Subjective     Jennifer Murrell is a 78 y.o. year old female who presents with No chief complaint on file., here for follow up visit.    HPI    Comes with sister and son Dallas.     Last visit telemedicine 4/15/24 with Dr. Coppola.  Carbidopa/levodopa 25/100 two tabs four times a day  Carbidopa/levodopa 50/200 CR at bedtime  Amantadine 100 mg twice a day  B12, folate and TSH check  MRI brain w/o contrast given degree of cognitive concerns on Neuropsych placing her in Dementia category  RTC in 6 months.         Hospital w/ UTI 8/31/24--had hallucinations, police were called, figures that did not talk. Got better in the hospital, then to rehab facility she seemed OK 1-2 days and now coming back a lot worse.     Prior to above hallucinations the past 6 weeks, mild/sporadic. She was aware she was having them and not real.   Now she is delusional--making up stories that are not real.  Thinks people walk by that have not. Thinks things happened that have not. She feels these things are real.   Hospital psychiatrist started Quetiapine.     60lb weight loss, off of BP meds and cholesterol meds.   Only meds she was on prior to hospital were PD meds and venlafaxine.   She was prescribed Oxybutynin but not taking prior to hospital and now on.    At [facility]  Appetite not great.     MOTOR SYMPTOMS      +/-                            Comments  Motor sx overall     Tremor + Very mild, was severe prior to the hospital   Rigidity + Only back and hip   Bradykinesia +    Balance/gait + Loosing balance more  Started using walker   FOG + Often   Falls + A few, usually on her knees--usually without knees, FOG contributes    PT + At SNF   Exercise       NON MOTOR SYMPTOMS       +/-                               Comments  Orthostatic lightheadedness  -    Cognitive + Worse  Better when on vacation with family and she was taking meds on schedule   Insomnia -    RBD -    Depression + Sometimes    Anxiety - Denies but hums  "when anxious or in a new environment    Fatigue + For years has slept a lot during the day   Hypophonia -    Dysphagia -    Constipation +    Urinary dysfunction + +IC        Social  Lives with: alone--plans for AL  Help with ADLs: complete         [facility] Meds  Med Dose Time   Carbidopa-levodopa 25/100mg 2 tabs 4 times a day [id number]-1eo-5pm   Carbidopa-levodopa 50/200mg 1 tab at bedtime Bedtime/ 8pm   Amantadine 100mg 1 tab 2 times a day 8am and 12pm   Latency     Wearing off     Side effects     Dyskinesia Not a lot but still wiggles a lot    Hallucinations As above    Other None      Prior medications:    Pertinent testin24: QTc 440    24 MRI brain:  IMPRESSION  * There is no evidence of mass, acute cerebral infarction or acute hemorrhage.    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 Language and learning/memory were relatively spared, demonstrating adequate consolidation and retrieval (with benefit from cues) on information .\"  ...\"Taken together, the current cognitive profile, including deficits in attention, processing speed, and executive function, combined with reported declines in ability to perform IADLs (not due to physical difficulties) are consistent with a dementia level of decline (major neurocognitive disorder F03.90). Etiologically, her history (with onset of cognitive difficulties following gait/tremor disturbance and visual disturbances) and current " "profile are most consistent with Parkinson's disease. While some contribution from her cerebrovascular risk factors (e.g., hypertension, hyperlipidemia), \"...    MoCA April 2023 was 23/30     Patient Health Questionnaire-2 Score: 0            Current Outpatient Medications:     amantadine (Symmetrel) 100 mg tablet, Take 1 tablet (100 mg) by mouth 2 times a day. Twice a day at 8a and noon, Disp: 180 tablet, Rfl: 3    carbidopa-levodopa (Sinemet CR)  mg ER tablet, Take 1 tablet by mouth once daily at bedtime., Disp: 90 tablet, Rfl: 3    carbidopa-levodopa (Sinemet)  mg tablet, Take 2 tablets by mouth 4 times a day. At 8a, 11a, 2p & 5p, Disp: 720 tablet, Rfl: 3    cyanocobalamin, vitamin B-12, (Vitamin B-12) 1,000 mcg tablet extended release, Take 1 tablet (1,000 mcg) by mouth once daily., Disp: , Rfl:     oxybutynin XL (Ditropan-XL) 10 mg 24 hr tablet, Take 1 tablet (10 mg) by mouth once daily., Disp: , Rfl:     polyethylene glycol (Glycolax, Miralax) 17 gram/dose powder, Take by mouth once daily., Disp: , Rfl:     QUEtiapine (SEROquel) 25 mg tablet, Take 1 tablet (25 mg) by mouth once daily at bedtime., Disp: , Rfl:     venlafaxine XR (Effexor XR) 150 mg 24 hr capsule, Take by mouth once daily., Disp: , Rfl:     venlafaxine XR (Effexor-XR) 75 mg 24 hr capsule, Take 1 capsule (75 mg) by mouth., Disp: , Rfl:        Objective   Vitals:    09/09/24 1553   BP: 147/68   BP Location: Left arm   Patient Position: Sitting   BP Cuff Size: Adult   Pulse: 69   Resp: 18                 Physical Exam    Mild upper body swaying/L shoulder shrugging    MDS UPDRS 1st Score: Motor Examination  Is the patient on medication for treating the symptoms of Parkinson's Disease?: Yes  Patients receiving medication for treating the symptoms of Parkinson's Disease, ben the patient's clinical state.: On  Is the patient on Levodopa?: Yes  Minutes since last Levodopa dose: ???  Speech: 1  Facial Expression: 2  Rigidty Neck: 0  Rigidty " RUE: 0  Rigidity - LUE: 1  Rigidity RLE: 0  Rigidity LLE: 0  Finger Tapping Right Hand: 1  Finger Tapping Left Hand: 2  Hand Movements- Right Hand: 2  Hand Movements- Left Hand: 3  Pronatiaon-Supination Movments - Right Hand: 3  Pronatiaon-Supination Movments Left Hand: 3  Toe Tapping Right Foot: 2  Toe Tapping - Left Foot: 2  Leg Agility - Right Le  Leg Agility - Left le  Arising from Chair: 2  Gait: 3  Freezing of Gait: 0  Postural Stability: 3  Posture: 2  Global Spontanteity of Movment ( Body Bradykinesia): 2  Postural Tremor - Right Hand: 1  Postural Tremor - Left hand: 1  Kinetic Tremor - Right hand: 1  Kinetic Tremor - Left hand: 1  Rest Tremor Amplitude - RUE: 1  Rest Tremor Amplitude - LUE: 1  Rest Tremor Amplitude - RLE: 0  Rest Tremor Amplitude - LLE: 0  Rest Tremor Amplitude - Lip/Jaw: 0  Constancy of Rest Tremor: 0  MDS UPDRS Total Score: 43  Were dyskinesias (chorea or dystonia) present during examination?: Yes  Did these movements interfere with your rating?: No        Assessment/Plan   Ms. Jennifer Murrell is a 78 y.o. F with tremor predominant PD, diagnosed around  and within this past year a diagnosis of dementia (NP testing 2024). She presents for sooner f/u after recent admission for UTI  for PD psychosis, started on Seroquel and now at SNF. Initially improved for a few days but now continues with hallucinations and delusions. Will stop amantadine, oxybutynin is also new when reviewing home med list with family (was prescribed in the past but she was not taking) and so this should also be d/c if able from primary team as can contribute to cognitive issues/anticholinergic. She did not get labs ordered for cognitive decline from last visit so needs those. Once acute issues ar again ruled out d/t sudden worsening when initially improved, can increase Seroquel. Discussed Seroquel vs Nuplazid with family. I will follow up in 6W and family will keep me updated.   Of note, when  recently on vacation and getting meds on time she did really well, so the plan after rehab is for AL and not for patient to live alone.     Patient wants son listed first.   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)  Cognitive decline  -     TSH with reflex to Free T4 if abnormal; Future  Protein-calorie malnutrition, unspecified severity (Multi)  -     TSH with reflex to Free T4 if abnormal; Future      #Add on TSH (was an option with recent labs), check folate and B12    #Continue meds as below BUT PLEASE VERIFY TIMES ARE CORRECT- getting meds on time is important for motor symptom control--1hr before/after does not apply here    Med Dose Time   Carbidopa-levodopa 25/100mg 2 tabs 4 times a day [id number]-9rz-5pm   Carbidopa-levodopa 50/200mg 1 tab at bedtime Bedtime/ 8pm       #If not already done, please have primary team at facility check on again having sudden worsening of hallucinations/delusions    Check UA, labs    #Decrease amantadine to 100mg daily x 1 week then STOP    Monitor dyskinesia (wiggly movements), if worsens we can try Rytary vs Crexont    #Would recommend primary team at facility consider stopping oxybutynin as can also worsen confusion and potentially hallucinations as she was not on this per pre-hospital med list    #If hallucinations/delusions are still an issue we can increase the Seroquel    Potential side effects include but are not limited to sedation, tachycardia (fast heart rate), agitation, dizziness, dry mouth, prolonged QTc interval (arrythmia)  Not to be used if history of cardiac arrhythmia. If these or any other side effects occur, please contact my office.   Discussed increased all cause morbidity and mortality including stroke or MI     #Follow up Oct 18 at 1130 am for virtual visit with  rosmery Crawley, NP-C  Adult/Gerontological Nurse Practitioner   [facility], Department of Neurology  [facility] [place]  [facility]  [address]  [place] [id number]  Phone: [phone]  Fax: [phone]

## 2024-09-11 ENCOUNTER — NURSING HOME VISIT (OUTPATIENT)
Dept: POST ACUTE CARE | Facility: EXTERNAL LOCATION | Age: 79
End: 2024-09-11
Payer: MEDICARE

## 2024-09-11 VITALS
DIASTOLIC BLOOD PRESSURE: 78 MMHG | BODY MASS INDEX: 23.49 KG/M2 | RESPIRATION RATE: 18 BRPM | WEIGHT: 150 LBS | SYSTOLIC BLOOD PRESSURE: 124 MMHG | TEMPERATURE: 97.6 F | OXYGEN SATURATION: 99 % | HEART RATE: 78 BPM

## 2024-09-11 DIAGNOSIS — R44.3 HALLUCINATION: Primary | ICD-10-CM
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
## 2024-09-11 DIAGNOSIS — I10 PRIMARY HYPERTENSION: ICD-10-CM

## 2024-09-11 DIAGNOSIS — G20.A1 PARKINSON'S DISEASE, UNSPECIFIED WHETHER DYSKINESIA PRESENT, UNSPECIFIED WHETHER MANIFESTATIONS FLUCTUATE (MULTI): ICD-10-CM
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
## 2024-09-11 PROCEDURE — 99309 SBSQ NF CARE MODERATE MDM 30: CPT | Performed by: NURSE PRACTITIONER

## 2024-09-11 ASSESSMENT — ENCOUNTER SYMPTOMS
COUGH: 0
FEVER: 0
VOMITING: 0
FATIGUE: 0
CHILLS: 0
SHORTNESS OF BREATH: 0
PALPITATIONS: 0
CONSTIPATION: 0
NAUSEA: 0
DIFFICULTY URINATING: 0
ABDOMINAL PAIN: 0
DIARRHEA: 0

## 2024-09-11 NOTE — ASSESSMENT & PLAN NOTE
Not currently on meds,  BPO reviewed and acceptable.  Continue to monitor and intiate meds based on clinical course.

## 2024-09-11 NOTE — ASSESSMENT & PLAN NOTE
Started on seroquel, was having hallucinations, resolved and then recurred.  They are not bothersome to her and she is aware they are not real.  It was unknown on admission she was not taking oxybutynin at home, this was dc'd.   She follows with Dr Coppola for PD and followed up with her 9/10,  amantadine decreased for 1 week and then stop.   Will check labs including TSH and B12 and ua to evlauate for any underlying pathology.  If labs are unremarkable and hallcuinations persist once oxybutynin and amantadine have stopped consider increasing seroquel.

## 2024-09-11 NOTE — LETTER
Patient: Jennifer Murrell  : 1945    Encounter Date: 2024    Subjective  Jennifer Murrell is a 78 y.o. female Here for weekly skilled visit.   [facility]  Here for weekly skilled visit.  Health problems reviewed.    She has had recurrence of halluciantions and was evaluated by Dr Coppola, amantadine being weaned off and oxybutynin has been discontinued.  She fell this morning attempting to self transfer, landed on her knees, no apparent injury.    Labs and UA are pending.  Participating in therapy and feeling stronger.  Eating and drinking well.  Denies any complaints of pain.  No concerns per staff.            Review of Systems   Constitutional:  Negative for chills, fatigue and fever.   Respiratory:  Negative for cough and shortness of breath.    Cardiovascular:  Negative for chest pain and palpitations.   Gastrointestinal:  Negative for abdominal pain, constipation, diarrhea, nausea and vomiting.   Genitourinary:  Negative for difficulty urinating.       Objective  /78   Pulse 78   Temp 36.4 °C (97.6 °F)   Resp 18   Wt 68 kg (150 lb)   SpO2 99%   BMI 23.49 kg/m²     Physical Exam  Constitutional:       General: She is not in acute distress.     Comments: Bradykinesia, slight masked facies.    HENT:      Head: Normocephalic and atraumatic.   Eyes:      Conjunctiva/sclera: Conjunctivae normal.   Cardiovascular:      Rate and Rhythm: Normal rate and regular rhythm.   Pulmonary:      Effort: Pulmonary effort is normal. No respiratory distress.      Breath sounds: Normal breath sounds.   Abdominal:      General: Bowel sounds are normal. There is no distension.      Palpations: Abdomen is soft.      Tenderness: There is no abdominal tenderness.   Musculoskeletal:         General: Normal range of motion.      Right lower leg: No edema.      Left lower leg: No edema.   Skin:     General: Skin is warm and dry.   Neurological:      General: No focal deficit present.      Mental Status: She is alert and  oriented to person, place, and time.   Psychiatric:         Mood and Affect: Mood normal.         Behavior: Behavior normal.         Assessment/Plan  Problem List Items Addressed This Visit       HTN (hypertension)     Not currently on meds,  BPO reviewed and acceptable.  Continue to monitor and intiate meds based on clinical course.          Parkinson disease (Multi)     On sinemet.  Amantadine being weaned off.    She follows with Dr Coppola.           Hallucination - Primary     Started on seroquel, was having hallucinations, resolved and then recurred.  They are not bothersome to her and she is aware they are not real.  It was unknown on admission she was not taking oxybutynin at home, this was dc'd.   She follows with Dr Coppola for PD and followed up with her 9/10,  amantadine decreased for 1 week and then stop.   Will check labs including TSH and B12 and ua to evlauate for any underlying pathology.  If labs are unremarkable and hallcuinations persist once oxybutynin and amantadine have stopped consider increasing seroquel.           Fall     She was attempting to self transfer to the bed and fell, landed on her knees.  No apparent injury except noted to have some ecchymosis to knees, no pain with ROM.  No chest pain, shortness of breath, syncope, lightheadedness or dizziness associated with the fall.  Displays poor safety awareness due to cogntive deficits which increases risks for falls.  Safety measures are in place.            labs/meds/orders reviewed  staff to monitor and notify for any changes.  Follow up with neurology as scheduled  Amatadine and oxybutynin being discontinued due to recurrence of hallucinations, if they persist consider increased seroqeul.   Ss for discharge planning, she resides alone in a 1 story home, family has concerns regarding some short term memory issues, possible dc to [facility] per therapy staff.            Electronically Signed By: TWIN Teran   9/11/24  1:37 PM

## 2024-09-11 NOTE — ASSESSMENT & PLAN NOTE
She was attempting to self transfer to the bed and fell, landed on her knees.  No apparent injury except noted to have some ecchymosis to knees, no pain with ROM.  No chest pain, shortness of breath, syncope, lightheadedness or dizziness associated with the fall.  Displays poor safety awareness due to cogntive deficits which increases risks for falls.  Safety measures are in place.

## 2024-09-11 NOTE — PROGRESS NOTES
Subjective   Jennifer Murrell is a 78 y.o. female Here for weekly skilled visit.   [facility]  Here for weekly skilled visit.  Health problems reviewed.    She has had recurrence of halluciantions and was evaluated by Dr Coppola, amantadine being weaned off and oxybutynin has been discontinued.  She fell this morning attempting to self transfer, landed on her knees, no apparent injury.    Labs and UA are pending.  Participating in therapy and feeling stronger.  Eating and drinking well.  Denies any complaints of pain.  No concerns per staff.            Review of Systems   Constitutional:  Negative for chills, fatigue and fever.   Respiratory:  Negative for cough and shortness of breath.    Cardiovascular:  Negative for chest pain and palpitations.   Gastrointestinal:  Negative for abdominal pain, constipation, diarrhea, nausea and vomiting.   Genitourinary:  Negative for difficulty urinating.       Objective   /78   Pulse 78   Temp 36.4 °C (97.6 °F)   Resp 18   Wt 68 kg (150 lb)   SpO2 99%   BMI 23.49 kg/m²     Physical Exam  Constitutional:       General: She is not in acute distress.     Comments: Bradykinesia, slight masked facies.    HENT:      Head: Normocephalic and atraumatic.   Eyes:      Conjunctiva/sclera: Conjunctivae normal.   Cardiovascular:      Rate and Rhythm: Normal rate and regular rhythm.   Pulmonary:      Effort: Pulmonary effort is normal. No respiratory distress.      Breath sounds: Normal breath sounds.   Abdominal:      General: Bowel sounds are normal. There is no distension.      Palpations: Abdomen is soft.      Tenderness: There is no abdominal tenderness.   Musculoskeletal:         General: Normal range of motion.      Right lower leg: No edema.      Left lower leg: No edema.   Skin:     General: Skin is warm and dry.   Neurological:      General: No focal deficit present.      Mental Status: She is alert and oriented to person, place, and time.   Psychiatric:         Mood and  Affect: Mood normal.         Behavior: Behavior normal.         Assessment/Plan   Problem List Items Addressed This Visit       HTN (hypertension)     Not currently on meds,  BPO reviewed and acceptable.  Continue to monitor and intiate meds based on clinical course.          Parkinson disease (Multi)     On sinemet.  Amantadine being weaned off.    She follows with Dr Coppola.           Hallucination - Primary     Started on seroquel, was having hallucinations, resolved and then recurred.  They are not bothersome to her and she is aware they are not real.  It was unknown on admission she was not taking oxybutynin at home, this was dc'd.   She follows with Dr Coppola for PD and followed up with her 9/10,  amantadine decreased for 1 week and then stop.   Will check labs including TSH and B12 and ua to evlauate for any underlying pathology.  If labs are unremarkable and hallcuinations persist once oxybutynin and amantadine have stopped consider increasing seroquel.           Fall     She was attempting to self transfer to the bed and fell, landed on her knees.  No apparent injury except noted to have some ecchymosis to knees, no pain with ROM.  No chest pain, shortness of breath, syncope, lightheadedness or dizziness associated with the fall.  Displays poor safety awareness due to cogntive deficits which increases risks for falls.  Safety measures are in place.            labs/meds/orders reviewed  staff to monitor and notify for any changes.  Follow up with neurology as scheduled  Amatadine and oxybutynin being discontinued due to recurrence of hallucinations, if they persist consider increased seroqeul.   Ss for discharge planning, she resides alone in a 1 story home, family has concerns regarding some short term memory issues, possible dc to [facility] per therapy staff.

## 2024-09-12 ENCOUNTER — APPOINTMENT (OUTPATIENT)
Dept: RADIOLOGY | Facility: HOSPITAL | Age: 79
DRG: 689 | End: 2024-09-12
Payer: MEDICARE

## 2024-09-12 ENCOUNTER — HOSPITAL ENCOUNTER (INPATIENT)
Facility: HOSPITAL | Age: 79
Discharge: NURSING, REHAB OR LONG-TERM CARE | DRG: 689 | End: 2024-09-12
Attending: EMERGENCY MEDICINE | Admitting: INTERNAL MEDICINE
Payer: MEDICARE

## 2024-09-12 ENCOUNTER — OFFICE VISIT (OUTPATIENT)
Dept: URGENT CARE | Age: 79
End: 2024-09-12
Payer: MEDICARE

## 2024-09-12 VITALS
OXYGEN SATURATION: 96 % | HEART RATE: 71 BPM | DIASTOLIC BLOOD PRESSURE: 81 MMHG | HEIGHT: 67 IN | SYSTOLIC BLOOD PRESSURE: 137 MMHG | TEMPERATURE: 97.7 F | BODY MASS INDEX: 23.23 KG/M2 | WEIGHT: 148 LBS | RESPIRATION RATE: 16 BRPM

## 2024-09-12 DIAGNOSIS — S61.412A LACERATION OF LEFT PALM WITHOUT COMPLICATION, INITIAL ENCOUNTER: ICD-10-CM

## 2024-09-12 DIAGNOSIS — I48.91 ATRIAL FIBRILLATION, UNSPECIFIED TYPE (MULTI): ICD-10-CM

## 2024-09-12 DIAGNOSIS — I48.91 ATRIAL FIBRILLATION AND FLUTTER: ICD-10-CM

## 2024-09-12 DIAGNOSIS — G93.41 ACUTE METABOLIC ENCEPHALOPATHY: Primary | ICD-10-CM

## 2024-09-12 DIAGNOSIS — G20.A1 PARKINSON'S DISEASE, UNSPECIFIED WHETHER DYSKINESIA PRESENT, UNSPECIFIED WHETHER MANIFESTATIONS FLUCTUATE: ICD-10-CM

## 2024-09-12 DIAGNOSIS — R00.2 HEART PALPITATIONS: ICD-10-CM

## 2024-09-12 DIAGNOSIS — E46 PROTEIN DEFICIENCY (MULTI): ICD-10-CM

## 2024-09-12 DIAGNOSIS — I10 PRIMARY HYPERTENSION: ICD-10-CM

## 2024-09-12 DIAGNOSIS — N39.0 URINARY TRACT INFECTION ASSOCIATED WITH CATHETERIZATION OF URINARY TRACT, UNSPECIFIED INDWELLING URINARY CATHETER TYPE, INITIAL ENCOUNTER (CMS-HCC): ICD-10-CM

## 2024-09-12 DIAGNOSIS — I48.92 ATRIAL FIBRILLATION AND FLUTTER: ICD-10-CM

## 2024-09-12 DIAGNOSIS — T83.511A URINARY TRACT INFECTION ASSOCIATED WITH CATHETERIZATION OF URINARY TRACT, UNSPECIFIED INDWELLING URINARY CATHETER TYPE, INITIAL ENCOUNTER (CMS-HCC): ICD-10-CM

## 2024-09-12 DIAGNOSIS — G20.A1 PARKINSON'S DISEASE WITHOUT DYSKINESIA OR FLUCTUATING MANIFESTATIONS: ICD-10-CM

## 2024-09-12 DIAGNOSIS — S61.412A SKIN TEAR OF LEFT HAND WITHOUT COMPLICATION, INITIAL ENCOUNTER: Primary | ICD-10-CM

## 2024-09-12 DIAGNOSIS — G47.00 INSOMNIA, UNSPECIFIED TYPE: ICD-10-CM

## 2024-09-12 DIAGNOSIS — N39.0 LOWER URINARY TRACT INFECTIOUS DISEASE: ICD-10-CM

## 2024-09-12 DIAGNOSIS — E78.5 HYPERLIPIDEMIA, UNSPECIFIED HYPERLIPIDEMIA TYPE: ICD-10-CM

## 2024-09-12 DIAGNOSIS — E56.9 VITAMIN DEFICIENCY: ICD-10-CM

## 2024-09-12 DIAGNOSIS — F32.A DEPRESSION, UNSPECIFIED DEPRESSION TYPE: ICD-10-CM

## 2024-09-12 DIAGNOSIS — K59.00 CONSTIPATION, UNSPECIFIED CONSTIPATION TYPE: ICD-10-CM

## 2024-09-12 LAB
ALBUMIN SERPL BCP-MCNC: 4 G/DL (ref 3.4–5)
ALP SERPL-CCNC: 78 U/L (ref 33–136)
ALT SERPL W P-5'-P-CCNC: <3 U/L (ref 7–45)
ANION GAP SERPL CALC-SCNC: 14 MMOL/L (ref 10–20)
APPEARANCE UR: CLEAR
AST SERPL W P-5'-P-CCNC: 13 U/L (ref 9–39)
BASOPHILS # BLD AUTO: 0.06 X10*3/UL (ref 0–0.1)
BASOPHILS NFR BLD AUTO: 0.4 %
BILIRUB SERPL-MCNC: 0.9 MG/DL (ref 0–1.2)
BILIRUB UR STRIP.AUTO-MCNC: NEGATIVE MG/DL
BUN SERPL-MCNC: 23 MG/DL (ref 6–23)
CALCIUM SERPL-MCNC: 8.9 MG/DL (ref 8.6–10.3)
CHLORIDE SERPL-SCNC: 102 MMOL/L (ref 98–107)
CO2 SERPL-SCNC: 24 MMOL/L (ref 21–32)
COLOR UR: YELLOW
CREAT SERPL-MCNC: 1.08 MG/DL (ref 0.5–1.05)
EGFRCR SERPLBLD CKD-EPI 2021: 53 ML/MIN/1.73M*2
EOSINOPHIL # BLD AUTO: 0 X10*3/UL (ref 0–0.4)
EOSINOPHIL NFR BLD AUTO: 0 %
ERYTHROCYTE [DISTWIDTH] IN BLOOD BY AUTOMATED COUNT: 11.9 % (ref 11.5–14.5)
GLUCOSE SERPL-MCNC: 130 MG/DL (ref 74–99)
GLUCOSE UR STRIP.AUTO-MCNC: NORMAL MG/DL
HCT VFR BLD AUTO: 37.6 % (ref 36–46)
HGB BLD-MCNC: 12.2 G/DL (ref 12–16)
IMM GRANULOCYTES # BLD AUTO: 0.06 X10*3/UL (ref 0–0.5)
IMM GRANULOCYTES NFR BLD AUTO: 0.4 % (ref 0–0.9)
KETONES UR STRIP.AUTO-MCNC: ABNORMAL MG/DL
LACTATE SERPL-SCNC: 1.4 MMOL/L (ref 0.4–2)
LEUKOCYTE ESTERASE UR QL STRIP.AUTO: NEGATIVE
LYMPHOCYTES # BLD AUTO: 0.29 X10*3/UL (ref 0.8–3)
LYMPHOCYTES NFR BLD AUTO: 1.8 %
MCH RBC QN AUTO: 31 PG (ref 26–34)
MCHC RBC AUTO-ENTMCNC: 32.4 G/DL (ref 32–36)
MCV RBC AUTO: 95 FL (ref 80–100)
MONOCYTES # BLD AUTO: 1.21 X10*3/UL (ref 0.05–0.8)
MONOCYTES NFR BLD AUTO: 7.5 %
MUCOUS THREADS #/AREA URNS AUTO: NORMAL /LPF
NEUTROPHILS # BLD AUTO: 14.43 X10*3/UL (ref 1.6–5.5)
NEUTROPHILS NFR BLD AUTO: 89.9 %
NITRITE UR QL STRIP.AUTO: NEGATIVE
NRBC BLD-RTO: 0 /100 WBCS (ref 0–0)
PH UR STRIP.AUTO: 6 [PH]
PLATELET # BLD AUTO: 210 X10*3/UL (ref 150–450)
POTASSIUM SERPL-SCNC: 4.6 MMOL/L (ref 3.5–5.3)
PROT SERPL-MCNC: 6.6 G/DL (ref 6.4–8.2)
PROT UR STRIP.AUTO-MCNC: ABNORMAL MG/DL
RBC # BLD AUTO: 3.94 X10*6/UL (ref 4–5.2)
RBC # UR STRIP.AUTO: ABNORMAL /UL
RBC #/AREA URNS AUTO: NORMAL /HPF
SODIUM SERPL-SCNC: 135 MMOL/L (ref 136–145)
SP GR UR STRIP.AUTO: 1.02
SQUAMOUS #/AREA URNS AUTO: NORMAL /HPF
UROBILINOGEN UR STRIP.AUTO-MCNC: NORMAL MG/DL
WBC # BLD AUTO: 16.1 X10*3/UL (ref 4.4–11.3)
WBC #/AREA URNS AUTO: NORMAL /HPF

## 2024-09-12 PROCEDURE — 96366 THER/PROPH/DIAG IV INF ADDON: CPT

## 2024-09-12 PROCEDURE — 82140 ASSAY OF AMMONIA: CPT

## 2024-09-12 PROCEDURE — 85025 COMPLETE CBC W/AUTO DIFF WBC: CPT | Performed by: EMERGENCY MEDICINE

## 2024-09-12 PROCEDURE — 71045 X-RAY EXAM CHEST 1 VIEW: CPT

## 2024-09-12 PROCEDURE — 87040 BLOOD CULTURE FOR BACTERIA: CPT | Mod: STJLAB | Performed by: EMERGENCY MEDICINE

## 2024-09-12 PROCEDURE — 36415 COLL VENOUS BLD VENIPUNCTURE: CPT | Performed by: EMERGENCY MEDICINE

## 2024-09-12 PROCEDURE — 2500000004 HC RX 250 GENERAL PHARMACY W/ HCPCS (ALT 636 FOR OP/ED): Performed by: EMERGENCY MEDICINE

## 2024-09-12 PROCEDURE — 96365 THER/PROPH/DIAG IV INF INIT: CPT

## 2024-09-12 PROCEDURE — 70450 CT HEAD/BRAIN W/O DYE: CPT | Performed by: RADIOLOGY

## 2024-09-12 PROCEDURE — 71045 X-RAY EXAM CHEST 1 VIEW: CPT | Performed by: RADIOLOGY

## 2024-09-12 PROCEDURE — 87075 CULTR BACTERIA EXCEPT BLOOD: CPT | Mod: 59,STJLAB | Performed by: EMERGENCY MEDICINE

## 2024-09-12 PROCEDURE — 81001 URINALYSIS AUTO W/SCOPE: CPT | Performed by: EMERGENCY MEDICINE

## 2024-09-12 PROCEDURE — 80053 COMPREHEN METABOLIC PANEL: CPT | Performed by: EMERGENCY MEDICINE

## 2024-09-12 PROCEDURE — 83605 ASSAY OF LACTIC ACID: CPT | Performed by: EMERGENCY MEDICINE

## 2024-09-12 PROCEDURE — 99285 EMERGENCY DEPT VISIT HI MDM: CPT

## 2024-09-12 PROCEDURE — 51798 US URINE CAPACITY MEASURE: CPT

## 2024-09-12 PROCEDURE — 70450 CT HEAD/BRAIN W/O DYE: CPT

## 2024-09-12 PROCEDURE — 99285 EMERGENCY DEPT VISIT HI MDM: CPT | Performed by: EMERGENCY MEDICINE

## 2024-09-12 RX ORDER — CEFTRIAXONE 2 G/50ML
2 INJECTION, SOLUTION INTRAVENOUS ONCE
Status: COMPLETED | OUTPATIENT
Start: 2024-09-12 | End: 2024-09-12

## 2024-09-12 RX ORDER — ACETAMINOPHEN 325 MG/1
975 TABLET ORAL ONCE
Status: COMPLETED | OUTPATIENT
Start: 2024-09-12 | End: 2024-09-12

## 2024-09-12 ASSESSMENT — LIFESTYLE VARIABLES
EVER FELT BAD OR GUILTY ABOUT YOUR DRINKING: NO
TOTAL SCORE: 0
EVER HAD A DRINK FIRST THING IN THE MORNING TO STEADY YOUR NERVES TO GET RID OF A HANGOVER: NO
HAVE YOU EVER FELT YOU SHOULD CUT DOWN ON YOUR DRINKING: NO
HAVE PEOPLE ANNOYED YOU BY CRITICIZING YOUR DRINKING: NO

## 2024-09-12 ASSESSMENT — COLUMBIA-SUICIDE SEVERITY RATING SCALE - C-SSRS
2. HAVE YOU ACTUALLY HAD ANY THOUGHTS OF KILLING YOURSELF?: NO
1. IN THE PAST MONTH, HAVE YOU WISHED YOU WERE DEAD OR WISHED YOU COULD GO TO SLEEP AND NOT WAKE UP?: NO
6. HAVE YOU EVER DONE ANYTHING, STARTED TO DO ANYTHING, OR PREPARED TO DO ANYTHING TO END YOUR LIFE?: NO

## 2024-09-12 ASSESSMENT — PAIN SCALES - GENERAL
PAINLEVEL_OUTOF10: 0 - NO PAIN

## 2024-09-12 ASSESSMENT — PAIN - FUNCTIONAL ASSESSMENT: PAIN_FUNCTIONAL_ASSESSMENT: 0-10

## 2024-09-12 NOTE — ED PROVIDER NOTES
EMERGENCY DEPARTMENT ENCOUNTER      Pt Name: Jennifer Murrell  MRN: [id number]  Birthdate 1945  Date of evaluation: 9/12/2024  Provider: Betty Posadas MD    CHIEF COMPLAINT       Chief Complaint   Patient presents with    Altered Mental Status    Fever     Pt has hx of dementia, per NH pt was caught trying to escape nursing facility, pt has no complaints, pt has fever 38.1          HISTORY OF PRESENT ILLNESS    HPI  A 78-year-old female with past medical history of essential hypertension, dementia, major depressive disorder, hyperlipidemia was brought by EMS from [facility] due to subjective fever (38.1C) and altered mental status. [facility] was reached out to in an attempt to get more information about the incident but no success. Family member (son) reported that he noted an altered mental status from her baseline today afternoon and more aggressive behavior than usual. Patient is not on blood thinners and currently with no complaints. Review of system is limited due to patient confusion.    Nursing Notes were reviewed.    PAST MEDICAL HISTORY   History reviewed. No pertinent past medical history.      SURGICAL HISTORY     History reviewed. No pertinent surgical history.      CURRENT MEDICATIONS       Previous Medications    AMANTADINE (SYMMETREL) 100 MG TABLET    Take 1 tablet (100 mg) by mouth 2 times a day. Twice a day at 8a and noon    CARBIDOPA-LEVODOPA (SINEMET CR)  MG ER TABLET    Take 1 tablet by mouth once daily at bedtime.    CARBIDOPA-LEVODOPA (SINEMET)  MG TABLET    Take 2 tablets by mouth 4 times a day. At 8a, 11a, 2p & 5p    CYANOCOBALAMIN, VITAMIN B-12, (VITAMIN B-12) 1,000 MCG TABLET EXTENDED RELEASE    Take 1 tablet (1,000 mcg) by mouth once daily.    OXYBUTYNIN XL (DITROPAN-XL) 10 MG 24 HR TABLET    Take 1 tablet (10 mg) by mouth once daily.    POLYETHYLENE GLYCOL (GLYCOLAX, MIRALAX) 17 GRAM/DOSE POWDER    Take by mouth once daily.     QUETIAPINE (SEROQUEL) 25 MG TABLET    Take 1 tablet (25 mg) by mouth once daily at bedtime.    VENLAFAXINE XR (EFFEXOR XR) 150 MG 24 HR CAPSULE    Take by mouth once daily.    VENLAFAXINE XR (EFFEXOR-XR) 75 MG 24 HR CAPSULE    Take 1 capsule (75 mg) by mouth.       ALLERGIES     Sulfa (sulfonamide antibiotics) and Sulfamethoxazole-trimethoprim    FAMILY HISTORY     No family history on file.       SOCIAL HISTORY       Social History     Socioeconomic History    Marital status:    Tobacco Use    Smoking status: Never    Smokeless tobacco: Never   Substance and Sexual Activity    Alcohol use: Never    Drug use: Never     Social Determinants of Health     Financial Resource Strain: Low Risk  (9/1/2024)    Overall Financial Resource Strain (CARDIA)     Difficulty of Paying Living Expenses: Not hard at all   Food Insecurity: No Food Insecurity (8/21/2024)    Received from [facility]    Hunger Vital Sign     Worried About Running Out of Food in the Last Year: Never true     Ran Out of Food in the Last Year: Never true   Transportation Needs: No Transportation Needs (9/1/2024)    PRAPARE - Transportation     Lack of Transportation (Medical): No     Lack of Transportation (Non-Medical): No   Physical Activity: Inactive (8/21/2024)    Received from [facility]    Exercise Vital Sign     Days of Exercise per Week: 1 day     Minutes of Exercise per Session: 0 min   Stress: Stress Concern Present (8/21/2024)    Received from [facility]    [employer] - Occupational Stress Questionnaire     Feeling of Stress : To some extent   Social Connections: Moderately Integrated (8/21/2024)    Received from [facility]    Social Connection and Isolation Panel [NHANES]     Frequency of Communication with Friends and Family: More than three times a week     Frequency of Social Gatherings with Friends and Family: More than three times a week     Attends Holiness Services: 1 to 4 times per year     Active  Member of Clubs or Organizations: Yes     Attends Club or Organization Meetings: More than 4 times per year     Marital Status:    Intimate Partner Violence: Not At Risk (8/21/2024)    Received from [facility]    Humiliation, Afraid, Rape, and Kick questionnaire     Fear of Current or Ex-Partner: No     Emotionally Abused: No     Physically Abused: No     Sexually Abused: No   Housing Stability: Low Risk  (9/1/2024)    Housing Stability Vital Sign     Unable to Pay for Housing in the Last Year: No     Number of Times Moved in the Last Year: 1     Homeless in the Last Year: No       SCREENINGS                        PHYSICAL EXAM    (up to 7 for level 4, 8 or more for level 5)     ED Triage Vitals [09/12/24 1902]   Temperature Heart Rate Respirations BP   (!) 38.1 °C (100.6 °F) 78 18 120/77      Pulse Ox Temp Source Heart Rate Source Patient Position   95 % Temporal Monitor Lying      BP Location FiO2 (%)     Right arm --       Physical Exam  Vitals reviewed.   Constitutional:       Appearance: She is not diaphoretic.   HENT:      Head: Normocephalic and atraumatic.   Eyes:      General: Visual field deficit present.      Extraocular Movements: Extraocular movements intact.      Right eye: No nystagmus.      Left eye: No nystagmus.      Pupils: Pupils are equal, round, and reactive to light.   Cardiovascular:      Rate and Rhythm: Normal rate and regular rhythm.      Heart sounds: Normal heart sounds.   Pulmonary:      Effort: Pulmonary effort is normal. No respiratory distress.      Breath sounds: No wheezing, rhonchi or rales.   Chest:      Chest wall: No tenderness.   Abdominal:      General: There is no distension.      Palpations: Abdomen is soft. There is no mass.      Tenderness: There is no abdominal tenderness. There is no guarding.   Musculoskeletal:         General: No swelling or tenderness. Normal range of motion.      Cervical back: Normal range of motion and neck supple. No rigidity.       "Comments: Multiple bruises on upper extremity on dorsal aspect of forearm bilaterally. Left hand thumb with 9 sutures between thenar eminence and proximal phalange.   Laceration around 1\" on both knees with no active bleeding.   Skin:     General: Skin is warm.      Capillary Refill: Capillary refill takes less than 2 seconds.   Neurological:      Mental Status: She is alert. She is confused.      GCS: GCS eye subscore is 4. GCS verbal subscore is 4. GCS motor subscore is 6.      Cranial Nerves: Dysarthria present. No cranial nerve deficit or facial asymmetry.      Sensory: No sensory deficit.      Motor: No weakness.      Coordination: Coordination abnormal.   Psychiatric:         Mood and Affect: Mood is not anxious.         Behavior: Behavior is not agitated.         Cognition and Memory: Memory is impaired.          DIAGNOSTIC RESULTS     LABS:  Labs Reviewed   CBC WITH AUTO DIFFERENTIAL - Abnormal       Result Value    WBC 16.1 (*)     nRBC 0.0      RBC 3.94 (*)     Hemoglobin 12.2      Hematocrit 37.6      MCV 95      MCH 31.0      MCHC 32.4      RDW 11.9      Platelets 210      Neutrophils % 89.9      Immature Granulocytes %, Automated 0.4      Lymphocytes % 1.8      Monocytes % 7.5      Eosinophils % 0.0      Basophils % 0.4      Neutrophils Absolute 14.43 (*)     Immature Granulocytes Absolute, Automated 0.06      Lymphocytes Absolute 0.29 (*)     Monocytes Absolute 1.21 (*)     Eosinophils Absolute 0.00      Basophils Absolute 0.06     COMPREHENSIVE METABOLIC PANEL - Abnormal    Glucose 130 (*)     Sodium 135 (*)     Potassium 4.6      Chloride 102      Bicarbonate 24      Anion Gap 14      Urea Nitrogen 23      Creatinine 1.08 (*)     eGFR 53 (*)     Calcium 8.9      Albumin 4.0      Alkaline Phosphatase 78      Total Protein 6.6      AST 13      Bilirubin, Total 0.9      ALT <3 (*)    URINALYSIS WITH REFLEX CULTURE AND MICROSCOPIC - Abnormal    Color, Urine Yellow      Appearance, Urine Clear      " Specific Gravity, Urine 1.018      pH, Urine 6.0      Protein, Urine 30 (1+) (*)     Glucose, Urine Normal      Blood, Urine 0.03 (TRACE) (*)     Ketones, Urine 10 (1+) (*)     Bilirubin, Urine NEGATIVE      Urobilinogen, Urine Normal      Nitrite, Urine NEGATIVE      Leukocyte Esterase, Urine NEGATIVE     LACTATE - Normal    Lactate 1.4      Narrative:     Venipuncture immediately after or during the administration of Metamizole may lead to falsely low results. Testing should be performed immediately  prior to Metamizole dosing.   BLOOD CULTURE   BLOOD CULTURE   URINALYSIS WITH REFLEX CULTURE AND MICROSCOPIC    Narrative:     The following orders were created for panel order Urinalysis with Reflex Culture and Microscopic.  Procedure                               Abnormality         Status                     ---------                               -----------         ------                     Urinalysis with Reflex C...[[id number]]  Abnormal            Final result               Extra Urine Gray Tube[[id number]]                            In process                   Please view results for these tests on the individual orders.   EXTRA URINE GRAY TUBE   AMMONIA   URINALYSIS MICROSCOPIC WITH REFLEX CULTURE    WBC, Urine 1-5      RBC, Urine 1-2      Squamous Epithelial Cells, Urine 1-9 (SPARSE)      Mucus, Urine FEW         All other labs were within normal range or not returned as of this dictation.    Imaging  XR chest 1 view   Final Result   Stable mild prominence of interstitial opacities.        No focal airspace consolidation.        MACRO:   None        Signed by: Roosevelt Richardson 9/12/2024 9:14 PM   Dictation workstation:   [id number]      CT head wo IV contrast   Final Result   No significant interval changes since previous exam. No evidence of   acute cortical infarct or intracranial hemorrhage. If there is   persistent clinical concern for acute cortical infarction, MRI with   diffusion-weighted images is a  better means for further evaluation as   clinically warranted.        MACRO:   None        Signed by: Gaston Hauser 9/12/2024 9:06 PM   Dictation workstation:   [id number]           Procedures  Procedures     EMERGENCY DEPARTMENT COURSE/MDM:     Diagnoses as of 09/12/24 2314   Lower urinary tract infectious disease   Acute metabolic encephalopathy        Medical Decision Making  A 78-year-old female with past medical history of essential hypertension, dementia, major depressive disorder, hyperlipidemia was brought by EMS from [facility] due to subjective fever (38.1C) and altered mental status.  Patient is active and alert but disoriented. She is febrile, CBC remarkable for leukocytosis 16.1. Outpatient U/A with positive results for UTI. Lactate 1.4.  She was started on Rocephin and Tylenol was given as well.  Chest imaging with mild prominence of interstitial opacities. CT head with no acute changes of infarct of hemorrhage. Patient meets sepsis criteria and will be admitted for further management of metabolic encephalopathy.      Patient and or family in agreement and understanding of treatment plan.  All questions answered.      I reviewed the case with the attending ED physician. The attending ED physician agrees with the plan. Patient and/or patient´s representative was counseled regarding labs, imaging, likely diagnosis, and plan. All questions were answered.    ED Medications administered this visit:    Medications   cefTRIAXone (Rocephin) 2 g in dextrose (iso) IV 50 mL (2 g intravenous New Bag 9/12/24 2102)   acetaminophen (Tylenol) tablet 975 mg (975 mg oral Given 9/12/24 2004)       New Prescriptions from this visit:    New Prescriptions    No medications on file       Follow-up:  No follow-up provider specified.      Final Impression:   1. Acute metabolic encephalopathy    2. Lower urinary tract infectious disease          (Please note that portions of this note were completed with a voice  recognition program.  Efforts were made to edit the dictations but occasionally words are mis-transcribed.)     Betty Posadas MD  Resident  09/12/24 2300       Betty Posadas MD  Resident  09/13/24 0032       Betty Posadas MD  Resident  09/13/24 0100

## 2024-09-12 NOTE — PROGRESS NOTES
"Subjective   Patient ID: Jennifer Murrell is a 78 y.o. female. They present today with a chief complaint of Injury (Laceration on left hand.).    History of Present Illness  Pt presents with family member with injury to the left hand that occurred yesterday. Skin tear to the top of the left hand after hitting it on a door accidentally. Bleeding controlled. While in rehab yesterday pt cut the palm of her left hand on unknown object. Wound was cleaned in rehab and steri-strips applied. Here today for re-evaluation. No other associated symptoms or concerns to address at this time. Tetanus is UTD.      Injury      Past Medical History  Allergies as of 09/12/2024 - Reviewed 09/12/2024   Allergen Reaction Noted    Sulfamethoxazole-trimethoprim Unknown 08/20/2023    Sulfa (sulfonamide antibiotics) Rash 03/16/2004       (Not in a hospital admission)       History reviewed. No pertinent past medical history.    History reviewed. No pertinent surgical history.     reports that she has never smoked. She has never used smokeless tobacco. She reports that she does not drink alcohol and does not use drugs.    Review of Systems  Review of Systems     10 point ROS completed and all are negative other than what is stated in the current HPI                            Objective    Vitals:    09/12/24 1013   BP: 137/81   Pulse: 71   Resp: 16   Temp: 36.5 °C (97.7 °F)   SpO2: 96%   Weight: 67.1 kg (148 lb)   Height: 1.702 m (5' 7\")     No LMP recorded.    Physical Exam  Constitutional:       Appearance: Normal appearance.   Skin:     General: Skin is warm and dry.      Capillary Refill: Capillary refill takes less than 2 seconds.             Comments: Skin tear to the top of the left hand; bleeding controlled; laceration about 2.54 cm to the palm of the hand; bleeding controlled; no s/s of infection at this time   Neurological:      Mental Status: She is alert.       Laceration Repair    Date/Time: 9/12/2024 12:29 PM    Performed by: " TWIN Watkins  Authorized by: TWIN Watkins    Consent:     Consent obtained:  Verbal    Consent given by:  Patient    Risks, benefits, and alternatives were discussed: yes      Risks discussed:  Infection and pain  Universal protocol:     Procedure explained and questions answered to patient or proxy's satisfaction: yes      Relevant documents present and verified: yes      Test results available: no      Imaging studies available: no      Required blood products, implants, devices, and special equipment available: no      Site/side marked: no      Immediately prior to procedure, a time out was called: no    Anesthesia:     Anesthesia method:  Local infiltration    Local anesthetic:  Lidocaine 2% w/o epi  Laceration details:     Location:  Hand    Hand location:  L palm    Length (cm):  3.8  Pre-procedure details:     Preparation:  Patient was prepped and draped in usual sterile fashion  Exploration:     Wound extent: no muscle damage noted, no nerve damage noted and no tendon damage noted      Contaminated: no    Treatment:     Area cleansed with:  Saline and chlorhexidine    Amount of cleaning:  Standard    Irrigation solution:  Sterile saline    Irrigation method:  Syringe    Visualized foreign bodies/material removed: no      Debridement:  None    Undermining:  None  Skin repair:     Repair method:  Sutures    Suture size:  5-0    Suture material:  Nylon    Suture technique:  Simple interrupted    Number of sutures:  9  Approximation:     Approximation:  Close  Post-procedure details:     Dressing:  Antibiotic ointment and bulky dressing    Procedure completion:  Tolerated well, no immediate complications  Comments:      Streri-strips to skin tear to top of hand      Point of Care Test & Imaging Results from this visit  Results for orders placed or performed during the hospital encounter of 08/31/24   Urine Culture    Specimen: Clean Catch/Voided; Urine   Result Value Ref Range     Urine Culture Normal genitourinary jose antonio    CBC with Differential   Result Value Ref Range    WBC 7.3 4.4 - 11.3 x10*3/uL    nRBC 0.0 0.0 - 0.0 /100 WBCs    RBC 3.88 (L) 4.00 - 5.20 x10*6/uL    Hemoglobin 12.3 12.0 - 16.0 g/dL    Hematocrit 36.4 36.0 - 46.0 %    MCV 94 80 - 100 fL    MCH 31.7 26.0 - 34.0 pg    MCHC 33.8 32.0 - 36.0 g/dL    RDW 11.9 11.5 - 14.5 %    Platelets 216 150 - 450 x10*3/uL    Neutrophils % 73.8 40.0 - 80.0 %    Immature Granulocytes %, Automated 0.4 0.0 - 0.9 %    Lymphocytes % 16.0 13.0 - 44.0 %    Monocytes % 8.7 2.0 - 10.0 %    Eosinophils % 0.4 0.0 - 6.0 %    Basophils % 0.7 0.0 - 2.0 %    Neutrophils Absolute 5.41 1.60 - 5.50 x10*3/uL    Immature Granulocytes Absolute, Automated 0.03 0.00 - 0.50 x10*3/uL    Lymphocytes Absolute 1.17 0.80 - 3.00 x10*3/uL    Monocytes Absolute 0.64 0.05 - 0.80 x10*3/uL    Eosinophils Absolute 0.03 0.00 - 0.40 x10*3/uL    Basophils Absolute 0.05 0.00 - 0.10 x10*3/uL   Comprehensive Metabolic Panel   Result Value Ref Range    Glucose 113 (H) 74 - 99 mg/dL    Sodium 135 (L) 136 - 145 mmol/L    Potassium 3.5 3.5 - 5.3 mmol/L    Chloride 103 98 - 107 mmol/L    Bicarbonate 19 (L) 21 - 32 mmol/L    Anion Gap 17 10 - 20 mmol/L    Urea Nitrogen 28 (H) 6 - 23 mg/dL    Creatinine 1.36 (H) 0.50 - 1.05 mg/dL    eGFR 40 (L) >60 mL/min/1.73m*2    Calcium 9.3 8.6 - 10.3 mg/dL    Albumin 4.0 3.4 - 5.0 g/dL    Alkaline Phosphatase 76 33 - 136 U/L    Total Protein 6.5 6.4 - 8.2 g/dL    AST 5 (L) 9 - 39 U/L    Bilirubin, Total 0.7 0.0 - 1.2 mg/dL    ALT <3 (L) 7 - 45 U/L   Urinalysis with Reflex Culture and Microscopic   Result Value Ref Range    Color, Urine Yellow Light-Yellow, Yellow, Dark-Yellow    Appearance, Urine Turbid (N) Clear    Specific Gravity, Urine 1.009 1.005 - 1.035    pH, Urine 6.5 5.0, 5.5, 6.0, 6.5, 7.0, 7.5, 8.0    Protein, Urine 20 (TRACE) NEGATIVE, 10 (TRACE), 20 (TRACE) mg/dL    Glucose, Urine Normal Normal mg/dL    Blood, Urine NEGATIVE NEGATIVE     Ketones, Urine TRACE (A) NEGATIVE mg/dL    Bilirubin, Urine NEGATIVE NEGATIVE    Urobilinogen, Urine Normal Normal mg/dL    Nitrite, Urine NEGATIVE NEGATIVE    Leukocyte Esterase, Urine 250 Mohan/µL (A) NEGATIVE   Extra Urine Gray Tube   Result Value Ref Range    Extra Tube Hold for add-ons.    Microscopic Only, Urine   Result Value Ref Range    WBC, Urine 6-10 (A) 1-5, NONE /HPF    RBC, Urine 1-2 NONE, 1-2, 3-5 /HPF    Squamous Epithelial Cells, Urine 10-25 (FEW) Reference range not established. /HPF    Bacteria, Urine 1+ (A) NONE SEEN /HPF    Mucus, Urine FEW Reference range not established. /LPF    Hyaline Casts, Urine 1+ (A) NONE /LPF   CBC   Result Value Ref Range    WBC 6.7 4.4 - 11.3 x10*3/uL    nRBC 0.0 0.0 - 0.0 /100 WBCs    RBC 3.58 (L) 4.00 - 5.20 x10*6/uL    Hemoglobin 11.2 (L) 12.0 - 16.0 g/dL    Hematocrit 34.5 (L) 36.0 - 46.0 %    MCV 96 80 - 100 fL    MCH 31.3 26.0 - 34.0 pg    MCHC 32.5 32.0 - 36.0 g/dL    RDW 11.9 11.5 - 14.5 %    Platelets 183 150 - 450 x10*3/uL   Comprehensive metabolic panel   Result Value Ref Range    Glucose 94 74 - 99 mg/dL    Sodium 141 136 - 145 mmol/L    Potassium 3.4 (L) 3.5 - 5.3 mmol/L    Chloride 107 98 - 107 mmol/L    Bicarbonate 25 21 - 32 mmol/L    Anion Gap 12 10 - 20 mmol/L    Urea Nitrogen 24 (H) 6 - 23 mg/dL    Creatinine 1.07 (H) 0.50 - 1.05 mg/dL    eGFR 54 (L) >60 mL/min/1.73m*2    Calcium 8.6 8.6 - 10.3 mg/dL    Albumin 3.6 3.4 - 5.0 g/dL    Alkaline Phosphatase 69 33 - 136 U/L    Total Protein 5.8 (L) 6.4 - 8.2 g/dL    AST 5 (L) 9 - 39 U/L    Bilirubin, Total 0.5 0.0 - 1.2 mg/dL    ALT <3 (L) 7 - 45 U/L   CBC   Result Value Ref Range    WBC 4.7 4.4 - 11.3 x10*3/uL    nRBC 0.0 0.0 - 0.0 /100 WBCs    RBC 3.53 (L) 4.00 - 5.20 x10*6/uL    Hemoglobin 11.0 (L) 12.0 - 16.0 g/dL    Hematocrit 34.6 (L) 36.0 - 46.0 %    MCV 98 80 - 100 fL    MCH 31.2 26.0 - 34.0 pg    MCHC 31.8 (L) 32.0 - 36.0 g/dL    RDW 12.1 11.5 - 14.5 %    Platelets 178 150 - 450 x10*3/uL    Magnesium   Result Value Ref Range    Magnesium 2.12 1.60 - 2.40 mg/dL   Basic Metabolic Panel   Result Value Ref Range    Glucose 79 74 - 99 mg/dL    Sodium 143 136 - 145 mmol/L    Potassium 3.4 (L) 3.5 - 5.3 mmol/L    Chloride 109 (H) 98 - 107 mmol/L    Bicarbonate 29 21 - 32 mmol/L    Anion Gap 8 (L) 10 - 20 mmol/L    Urea Nitrogen 20 6 - 23 mg/dL    Creatinine 0.89 0.50 - 1.05 mg/dL    eGFR 67 >60 mL/min/1.73m*2    Calcium 8.4 (L) 8.6 - 10.3 mg/dL   CBC   Result Value Ref Range    WBC 4.7 4.4 - 11.3 x10*3/uL    nRBC 0.0 0.0 - 0.0 /100 WBCs    RBC 3.51 (L) 4.00 - 5.20 x10*6/uL    Hemoglobin 11.0 (L) 12.0 - 16.0 g/dL    Hematocrit 35.1 (L) 36.0 - 46.0 %     80 - 100 fL    MCH 31.3 26.0 - 34.0 pg    MCHC 31.3 (L) 32.0 - 36.0 g/dL    RDW 12.2 11.5 - 14.5 %    Platelets 182 150 - 450 x10*3/uL   Magnesium   Result Value Ref Range    Magnesium 2.10 1.60 - 2.40 mg/dL   Basic Metabolic Panel   Result Value Ref Range    Glucose 87 74 - 99 mg/dL    Sodium 140 136 - 145 mmol/L    Potassium 3.8 3.5 - 5.3 mmol/L    Chloride 108 (H) 98 - 107 mmol/L    Bicarbonate 26 21 - 32 mmol/L    Anion Gap 10 10 - 20 mmol/L    Urea Nitrogen 23 6 - 23 mg/dL    Creatinine 0.84 0.50 - 1.05 mg/dL    eGFR 72 >60 mL/min/1.73m*2    Calcium 8.4 (L) 8.6 - 10.3 mg/dL   CBC   Result Value Ref Range    WBC 5.3 4.4 - 11.3 x10*3/uL    nRBC 0.0 0.0 - 0.0 /100 WBCs    RBC 3.65 (L) 4.00 - 5.20 x10*6/uL    Hemoglobin 11.2 (L) 12.0 - 16.0 g/dL    Hematocrit 36.0 36.0 - 46.0 %    MCV 99 80 - 100 fL    MCH 30.7 26.0 - 34.0 pg    MCHC 31.1 (L) 32.0 - 36.0 g/dL    RDW 12.0 11.5 - 14.5 %    Platelets 190 150 - 450 x10*3/uL   Magnesium   Result Value Ref Range    Magnesium 2.03 1.60 - 2.40 mg/dL   Basic Metabolic Panel   Result Value Ref Range    Glucose 87 74 - 99 mg/dL    Sodium 141 136 - 145 mmol/L    Potassium 3.5 3.5 - 5.3 mmol/L    Chloride 108 (H) 98 - 107 mmol/L    Bicarbonate 28 21 - 32 mmol/L    Anion Gap 9 (L) 10 - 20 mmol/L    Urea Nitrogen 20  6 - 23 mg/dL    Creatinine 0.93 0.50 - 1.05 mg/dL    eGFR 64 >60 mL/min/1.73m*2    Calcium 8.7 8.6 - 10.3 mg/dL   POCT GLUCOSE   Result Value Ref Range    POCT Glucose 118 (H) 74 - 99 mg/dL   ECG 12 lead   Result Value Ref Range    Ventricular Rate 72 BPM    Atrial Rate 72 BPM    MD Interval 144 ms    QRS Duration 80 ms    QT Interval 402 ms    QTC Calculation(Bazett) 440 ms    P Axis 79 degrees    R Axis 71 degrees    T Axis 83 degrees    QRS Count 12 beats    Q Onset 222 ms    P Onset 150 ms    P Offset 186 ms    T Offset 423 ms    QTC Fredericia 427 ms     No results found.    Diagnostic study results (if any) were reviewed by TWIN Watkins.    Assessment/Plan   Allergies, medications, history, and pertinent labs/EKGs/Imaging reviewed by TWIN Watkins.     Medical Decision Making      Orders and Diagnoses  Diagnoses and all orders for this visit:  Skin tear of left hand without complication, initial encounter  Laceration of left palm without complication, initial encounter      Medical Admin Record      Follow Up Instructions  No follow-ups on file.    Patient disposition: Home    Electronically signed by TWIN Watkins  11:15 AM

## 2024-09-13 PROBLEM — D72.9 NEUTROPHILIC LEUKOCYTOSIS: Status: ACTIVE | Noted: 2024-09-13

## 2024-09-13 PROBLEM — N39.0 UTI (URINARY TRACT INFECTION): Status: ACTIVE | Noted: 2024-09-13

## 2024-09-13 PROBLEM — L03.114 CELLULITIS OF LEFT FOREARM: Status: ACTIVE | Noted: 2024-09-13

## 2024-09-13 PROBLEM — N17.9 AKI (ACUTE KIDNEY INJURY) (CMS-HCC): Status: ACTIVE | Noted: 2024-09-13

## 2024-09-13 PROBLEM — D72.828 NEUTROPHILIC LEUKOCYTOSIS: Status: ACTIVE | Noted: 2024-09-13

## 2024-09-13 LAB
AMMONIA PLAS-SCNC: 22 UMOL/L (ref 16–53)
ANION GAP SERPL CALC-SCNC: 13 MMOL/L (ref 10–20)
BUN SERPL-MCNC: 21 MG/DL (ref 6–23)
CALCIUM SERPL-MCNC: 8.6 MG/DL (ref 8.6–10.3)
CHLORIDE SERPL-SCNC: 101 MMOL/L (ref 98–107)
CO2 SERPL-SCNC: 28 MMOL/L (ref 21–32)
CREAT SERPL-MCNC: 0.91 MG/DL (ref 0.5–1.05)
EGFRCR SERPLBLD CKD-EPI 2021: 66 ML/MIN/1.73M*2
ERYTHROCYTE [DISTWIDTH] IN BLOOD BY AUTOMATED COUNT: 12 % (ref 11.5–14.5)
GLUCOSE SERPL-MCNC: 86 MG/DL (ref 74–99)
HCT VFR BLD AUTO: 36.5 % (ref 36–46)
HGB BLD-MCNC: 11.6 G/DL (ref 12–16)
HOLD SPECIMEN: NORMAL
INR PPP: 1.1 (ref 0.9–1.1)
MAGNESIUM SERPL-MCNC: 2.39 MG/DL (ref 1.6–2.4)
MCH RBC QN AUTO: 30.8 PG (ref 26–34)
MCHC RBC AUTO-ENTMCNC: 31.8 G/DL (ref 32–36)
MCV RBC AUTO: 97 FL (ref 80–100)
NRBC BLD-RTO: 0 /100 WBCS (ref 0–0)
PHOSPHATE SERPL-MCNC: 3.2 MG/DL (ref 2.5–4.9)
PLATELET # BLD AUTO: 212 X10*3/UL (ref 150–450)
POTASSIUM SERPL-SCNC: 3.3 MMOL/L (ref 3.5–5.3)
PROTHROMBIN TIME: 12.1 SECONDS (ref 9.8–12.8)
RBC # BLD AUTO: 3.77 X10*6/UL (ref 4–5.2)
SODIUM SERPL-SCNC: 139 MMOL/L (ref 136–145)
WBC # BLD AUTO: 11.6 X10*3/UL (ref 4.4–11.3)

## 2024-09-13 PROCEDURE — 97161 PT EVAL LOW COMPLEX 20 MIN: CPT | Mod: GP

## 2024-09-13 PROCEDURE — 2500000004 HC RX 250 GENERAL PHARMACY W/ HCPCS (ALT 636 FOR OP/ED): Performed by: NURSE PRACTITIONER

## 2024-09-13 PROCEDURE — 84100 ASSAY OF PHOSPHORUS: CPT

## 2024-09-13 PROCEDURE — 85610 PROTHROMBIN TIME: CPT

## 2024-09-13 PROCEDURE — 1200000002 HC GENERAL ROOM WITH TELEMETRY DAILY

## 2024-09-13 PROCEDURE — 83735 ASSAY OF MAGNESIUM: CPT

## 2024-09-13 PROCEDURE — 2500000002 HC RX 250 W HCPCS SELF ADMINISTERED DRUGS (ALT 637 FOR MEDICARE OP, ALT 636 FOR OP/ED): Performed by: NURSE PRACTITIONER

## 2024-09-13 PROCEDURE — 97165 OT EVAL LOW COMPLEX 30 MIN: CPT | Mod: GO

## 2024-09-13 PROCEDURE — 2500000004 HC RX 250 GENERAL PHARMACY W/ HCPCS (ALT 636 FOR OP/ED)

## 2024-09-13 PROCEDURE — 80048 BASIC METABOLIC PNL TOTAL CA: CPT

## 2024-09-13 PROCEDURE — 36415 COLL VENOUS BLD VENIPUNCTURE: CPT

## 2024-09-13 PROCEDURE — 2500000001 HC RX 250 WO HCPCS SELF ADMINISTERED DRUGS (ALT 637 FOR MEDICARE OP): Performed by: NURSE PRACTITIONER

## 2024-09-13 PROCEDURE — 85027 COMPLETE CBC AUTOMATED: CPT

## 2024-09-13 RX ORDER — SODIUM CHLORIDE, SODIUM LACTATE, POTASSIUM CHLORIDE, CALCIUM CHLORIDE 600; 310; 30; 20 MG/100ML; MG/100ML; MG/100ML; MG/100ML
70 INJECTION, SOLUTION INTRAVENOUS CONTINUOUS
Status: ACTIVE | OUTPATIENT
Start: 2024-09-13 | End: 2024-09-14

## 2024-09-13 RX ORDER — VENLAFAXINE HYDROCHLORIDE 75 MG/1
75 CAPSULE, EXTENDED RELEASE ORAL
Status: DISCONTINUED | OUTPATIENT
Start: 2024-09-14 | End: 2024-09-17 | Stop reason: HOSPADM

## 2024-09-13 RX ORDER — POTASSIUM CHLORIDE 20 MEQ/1
40 TABLET, EXTENDED RELEASE ORAL ONCE
Status: COMPLETED | OUTPATIENT
Start: 2024-09-13 | End: 2024-09-13

## 2024-09-13 RX ORDER — CARBIDOPA AND LEVODOPA 50; 200 MG/1; MG/1
1 TABLET, EXTENDED RELEASE ORAL NIGHTLY
Status: DISCONTINUED | OUTPATIENT
Start: 2024-09-13 | End: 2024-09-17 | Stop reason: HOSPADM

## 2024-09-13 RX ORDER — ACETAMINOPHEN 325 MG/1
650 TABLET ORAL EVERY 6 HOURS PRN
Status: DISCONTINUED | OUTPATIENT
Start: 2024-09-13 | End: 2024-09-17 | Stop reason: HOSPADM

## 2024-09-13 RX ORDER — POLYETHYLENE GLYCOL 3350 17 G/17G
17 POWDER, FOR SOLUTION ORAL DAILY PRN
Status: DISCONTINUED | OUTPATIENT
Start: 2024-09-13 | End: 2024-09-13

## 2024-09-13 RX ORDER — TALC
3 POWDER (GRAM) TOPICAL NIGHTLY PRN
Status: DISCONTINUED | OUTPATIENT
Start: 2024-09-13 | End: 2024-09-13

## 2024-09-13 RX ORDER — DIPHENHYDRAMINE HYDROCHLORIDE 50 MG/ML
25 INJECTION INTRAMUSCULAR; INTRAVENOUS ONCE
Status: COMPLETED | OUTPATIENT
Start: 2024-09-13 | End: 2024-09-13

## 2024-09-13 RX ORDER — CEFTRIAXONE 2 G/50ML
2 INJECTION, SOLUTION INTRAVENOUS DAILY
Status: DISCONTINUED | OUTPATIENT
Start: 2024-09-13 | End: 2024-09-14

## 2024-09-13 RX ORDER — CARBIDOPA AND LEVODOPA 25; 100 MG/1; MG/1
2 TABLET ORAL 4 TIMES DAILY
Status: DISCONTINUED | OUTPATIENT
Start: 2024-09-13 | End: 2024-09-17 | Stop reason: HOSPADM

## 2024-09-13 RX ORDER — TALC
3 POWDER (GRAM) TOPICAL NIGHTLY
Status: DISCONTINUED | OUTPATIENT
Start: 2024-09-13 | End: 2024-09-17 | Stop reason: HOSPADM

## 2024-09-13 RX ORDER — POLYETHYLENE GLYCOL 3350 17 G/17G
17 POWDER, FOR SOLUTION ORAL DAILY
Status: ON HOLD | COMMUNITY
End: 2024-09-16

## 2024-09-13 RX ORDER — QUETIAPINE FUMARATE 25 MG/1
25 TABLET, FILM COATED ORAL NIGHTLY
Status: DISCONTINUED | OUTPATIENT
Start: 2024-09-13 | End: 2024-09-17 | Stop reason: HOSPADM

## 2024-09-13 RX ORDER — VENLAFAXINE HYDROCHLORIDE 75 MG/1
150 CAPSULE, EXTENDED RELEASE ORAL
Status: DISCONTINUED | OUTPATIENT
Start: 2024-09-14 | End: 2024-09-17 | Stop reason: HOSPADM

## 2024-09-13 RX ORDER — MELATONIN 3 MG
3 CAPSULE ORAL DAILY PRN
COMMUNITY
End: 2024-09-17 | Stop reason: HOSPADM

## 2024-09-13 RX ORDER — AMANTADINE HYDROCHLORIDE 100 MG/1
100 CAPSULE, GELATIN COATED ORAL DAILY
Status: COMPLETED | OUTPATIENT
Start: 2024-09-13 | End: 2024-09-16

## 2024-09-13 RX ORDER — POLYETHYLENE GLYCOL 3350 17 G/17G
17 POWDER, FOR SOLUTION ORAL DAILY
Status: DISCONTINUED | OUTPATIENT
Start: 2024-09-13 | End: 2024-09-17 | Stop reason: HOSPADM

## 2024-09-13 RX ORDER — SODIUM CHLORIDE 9 MG/ML
75 INJECTION, SOLUTION INTRAVENOUS CONTINUOUS
Status: DISCONTINUED | OUTPATIENT
Start: 2024-09-13 | End: 2024-09-13

## 2024-09-13 SDOH — SOCIAL STABILITY: SOCIAL INSECURITY: HAVE YOU HAD ANY THOUGHTS OF HARMING ANYONE ELSE?: UNABLE TO ASSESS

## 2024-09-13 SDOH — SOCIAL STABILITY: SOCIAL INSECURITY: HAS ANYONE EVER THREATENED TO HURT YOUR FAMILY OR YOUR PETS?: UNABLE TO ASSESS

## 2024-09-13 SDOH — SOCIAL STABILITY: SOCIAL INSECURITY: WERE YOU ABLE TO COMPLETE ALL THE BEHAVIORAL HEALTH SCREENINGS?: NO

## 2024-09-13 SDOH — SOCIAL STABILITY: SOCIAL INSECURITY: ARE THERE ANY APPARENT SIGNS OF INJURIES/BEHAVIORS THAT COULD BE RELATED TO ABUSE/NEGLECT?: UNABLE TO ASSESS

## 2024-09-13 SDOH — SOCIAL STABILITY: SOCIAL INSECURITY: HAVE YOU HAD THOUGHTS OF HARMING ANYONE ELSE?: UNABLE TO ASSESS

## 2024-09-13 SDOH — SOCIAL STABILITY: SOCIAL INSECURITY: ARE YOU OR HAVE YOU BEEN THREATENED OR ABUSED PHYSICALLY, EMOTIONALLY, OR SEXUALLY BY ANYONE?: UNABLE TO ASSESS

## 2024-09-13 SDOH — SOCIAL STABILITY: SOCIAL INSECURITY: DO YOU FEEL UNSAFE GOING BACK TO THE PLACE WHERE YOU ARE LIVING?: UNABLE TO ASSESS

## 2024-09-13 SDOH — SOCIAL STABILITY: SOCIAL INSECURITY: DO YOU FEEL ANYONE HAS EXPLOITED OR TAKEN ADVANTAGE OF YOU FINANCIALLY OR OF YOUR PERSONAL PROPERTY?: UNABLE TO ASSESS

## 2024-09-13 SDOH — SOCIAL STABILITY: SOCIAL INSECURITY: DOES ANYONE TRY TO KEEP YOU FROM HAVING/CONTACTING OTHER FRIENDS OR DOING THINGS OUTSIDE YOUR HOME?: UNABLE TO ASSESS

## 2024-09-13 ASSESSMENT — ACTIVITIES OF DAILY LIVING (ADL)
BATHING: DEPENDENT
LACK_OF_TRANSPORTATION: NO
DRESSING YOURSELF: DEPENDENT
TOILETING: DEPENDENT
GROOMING: DEPENDENT
FEEDING YOURSELF: DEPENDENT
HEARING - RIGHT EAR: UNABLE TO ASSESS
PATIENT'S MEMORY ADEQUATE TO SAFELY COMPLETE DAILY ACTIVITIES?: NO
ADL_ASSISTANCE: NEEDS ASSISTANCE
LACK_OF_TRANSPORTATION: PATIENT UNABLE TO ANSWER
JUDGMENT_ADEQUATE_SAFELY_COMPLETE_DAILY_ACTIVITIES: NO
ADEQUATE_TO_COMPLETE_ADL: UNABLE TO ASSESS
ADL_ASSISTANCE: INDEPENDENT
WALKS IN HOME: DEPENDENT
LACK_OF_TRANSPORTATION: PATIENT UNABLE TO ANSWER
LACK_OF_TRANSPORTATION: PATIENT UNABLE TO ANSWER
HEARING - LEFT EAR: UNABLE TO ASSESS

## 2024-09-13 ASSESSMENT — COGNITIVE AND FUNCTIONAL STATUS - GENERAL
HELP NEEDED FOR BATHING: A LOT
WALKING IN HOSPITAL ROOM: A LOT
TOILETING: TOTAL
MOBILITY SCORE: 13
MOVING TO AND FROM BED TO CHAIR: A LOT
DRESSING REGULAR UPPER BODY CLOTHING: A LOT
EATING MEALS: A LITTLE
DRESSING REGULAR UPPER BODY CLOTHING: A LITTLE
WALKING IN HOSPITAL ROOM: A LOT
PERSONAL GROOMING: A LITTLE
STANDING UP FROM CHAIR USING ARMS: A LOT
WALKING IN HOSPITAL ROOM: A LOT
MOVING FROM LYING ON BACK TO SITTING ON SIDE OF FLAT BED WITH BEDRAILS: TOTAL
CLIMB 3 TO 5 STEPS WITH RAILING: TOTAL
DRESSING REGULAR LOWER BODY CLOTHING: A LOT
EATING MEALS: TOTAL
HELP NEEDED FOR BATHING: A LOT
DRESSING REGULAR UPPER BODY CLOTHING: A LITTLE
WALKING IN HOSPITAL ROOM: A LOT
HELP NEEDED FOR BATHING: TOTAL
STANDING UP FROM CHAIR USING ARMS: A LOT
DRESSING REGULAR LOWER BODY CLOTHING: A LOT
PERSONAL GROOMING: A LITTLE
TURNING FROM BACK TO SIDE WHILE IN FLAT BAD: A LITTLE
CLIMB 3 TO 5 STEPS WITH RAILING: TOTAL
DRESSING REGULAR LOWER BODY CLOTHING: TOTAL
MOVING FROM LYING ON BACK TO SITTING ON SIDE OF FLAT BED WITH BEDRAILS: A LITTLE
CLIMB 3 TO 5 STEPS WITH RAILING: A LOT
DRESSING REGULAR UPPER BODY CLOTHING: A LOT
MOVING FROM LYING ON BACK TO SITTING ON SIDE OF FLAT BED WITH BEDRAILS: A LOT
HELP NEEDED FOR BATHING: TOTAL
STANDING UP FROM CHAIR USING ARMS: A LOT
TOILETING: A LOT
TOILETING: TOTAL
CLIMB 3 TO 5 STEPS WITH RAILING: TOTAL
EATING MEALS: A LOT
PERSONAL GROOMING: A LOT
CLIMB 3 TO 5 STEPS WITH RAILING: TOTAL
TOILETING: TOTAL
DRESSING REGULAR LOWER BODY CLOTHING: TOTAL
DAILY ACTIVITIY SCORE: 6
MOVING TO AND FROM BED TO CHAIR: A LOT
PATIENT BASELINE BEDBOUND: UNABLE TO ASSESS AT THIS TIME
DRESSING REGULAR UPPER BODY CLOTHING: TOTAL
TURNING FROM BACK TO SIDE WHILE IN FLAT BAD: A LOT
MOVING TO AND FROM BED TO CHAIR: A LOT
WALKING IN HOSPITAL ROOM: A LOT
PERSONAL GROOMING: TOTAL
EATING MEALS: A LITTLE
MOVING FROM LYING ON BACK TO SITTING ON SIDE OF FLAT BED WITH BEDRAILS: A LOT
MOVING FROM LYING ON BACK TO SITTING ON SIDE OF FLAT BED WITH BEDRAILS: A LITTLE
HELP NEEDED FOR BATHING: A LOT
MOVING TO AND FROM BED TO CHAIR: TOTAL
EATING MEALS: A LITTLE
DRESSING REGULAR LOWER BODY CLOTHING: A LOT
STANDING UP FROM CHAIR USING ARMS: TOTAL
DAILY ACTIVITIY SCORE: 16
HELP NEEDED FOR BATHING: TOTAL
EATING MEALS: A LITTLE
CLIMB 3 TO 5 STEPS WITH RAILING: TOTAL
MOBILITY SCORE: 6
DAILY ACTIVITIY SCORE: 9
STANDING UP FROM CHAIR USING ARMS: A LOT
TOILETING: A LOT
MOBILITY SCORE: 12
MOBILITY SCORE: 10
PERSONAL GROOMING: TOTAL
DRESSING REGULAR UPPER BODY CLOTHING: A LOT
DAILY ACTIVITIY SCORE: 9
MOBILITY SCORE: 13
DAILY ACTIVITIY SCORE: 12
TURNING FROM BACK TO SIDE WHILE IN FLAT BAD: A LITTLE
MOVING TO AND FROM BED TO CHAIR: A LOT
STANDING UP FROM CHAIR USING ARMS: A LOT
MOBILITY SCORE: 12
MOVING TO AND FROM BED TO CHAIR: A LOT
WALKING IN HOSPITAL ROOM: TOTAL
TURNING FROM BACK TO SIDE WHILE IN FLAT BAD: A LOT
TURNING FROM BACK TO SIDE WHILE IN FLAT BAD: TOTAL
MOVING FROM LYING ON BACK TO SITTING ON SIDE OF FLAT BED WITH BEDRAILS: A LITTLE
DAILY ACTIVITIY SCORE: 15
PERSONAL GROOMING: TOTAL
DRESSING REGULAR LOWER BODY CLOTHING: TOTAL
TURNING FROM BACK TO SIDE WHILE IN FLAT BAD: TOTAL
TOILETING: A LITTLE

## 2024-09-13 ASSESSMENT — ENCOUNTER SYMPTOMS
DIARRHEA: 0
CONSTIPATION: 0
SORE THROAT: 0
HEADACHES: 0
FLANK PAIN: 0
HEMATURIA: 0
FATIGUE: 1
COUGH: 0
SHORTNESS OF BREATH: 0
FREQUENCY: 0
CHILLS: 0
PALPITATIONS: 0
ABDOMINAL DISTENTION: 0
FEVER: 1
POLYPHAGIA: 0
COLOR CHANGE: 0
NERVOUS/ANXIOUS: 0
ABDOMINAL PAIN: 0
NAUSEA: 0
DIFFICULTY URINATING: 0
POLYDIPSIA: 0
DIZZINESS: 0
DYSURIA: 0
VOMITING: 0
CHEST TIGHTNESS: 0
TROUBLE SWALLOWING: 0
HALLUCINATIONS: 1
AGITATION: 0
BRUISES/BLEEDS EASILY: 1
CONFUSION: 1
WEAKNESS: 1
BACK PAIN: 0
WOUND: 0
LIGHT-HEADEDNESS: 0

## 2024-09-13 ASSESSMENT — PAIN - FUNCTIONAL ASSESSMENT
PAIN_FUNCTIONAL_ASSESSMENT: 0-10

## 2024-09-13 ASSESSMENT — PAIN SCALES - GENERAL
PAINLEVEL_OUTOF10: 0 - NO PAIN
PAINLEVEL_OUTOF10: 3

## 2024-09-13 ASSESSMENT — VISUAL ACUITY: OU: 1

## 2024-09-13 ASSESSMENT — LIFESTYLE VARIABLES
HOW OFTEN DO YOU HAVE A DRINK CONTAINING ALCOHOL: PATIENT UNABLE TO ANSWER
HOW MANY STANDARD DRINKS CONTAINING ALCOHOL DO YOU HAVE ON A TYPICAL DAY: PATIENT UNABLE TO ANSWER
AUDIT-C TOTAL SCORE: -1
AUDIT-C TOTAL SCORE: -1
HOW OFTEN DO YOU HAVE 6 OR MORE DRINKS ON ONE OCCASION: PATIENT UNABLE TO ANSWER
SKIP TO QUESTIONS 9-10: 0

## 2024-09-13 ASSESSMENT — PAIN DESCRIPTION - LOCATION: LOCATION: HAND

## 2024-09-13 NOTE — H&P
History Of Present Illness  Jennifer Murrell is a 78 y.o. female with pertinent past medical history of metabolic encephalopathy, Parkinson's disease, HTN, HLD, depression, and hallucinations presents to [facility] on 9/12/2024 from [facility] with reported concerns of altered mental status and fever.    Per ER provider note, patient was sent to the ER for altered mental status worse than baseline and aggressive behavior noted at her facility. Patient's son is not at the bedside at the time of assessment. However, patient is AO x 3 (self, time, and situation) upon assessment. She states that she is having worsening visual hallucinations which she knows are not real along with feeling confused at times. She mentions feeling weak and fatigued and recently injuring her left hand. This is consistent with documented urgent care visit from 9/12/2024 where she had a left palm laceration that was repaired with 9 sutures. She states she has also fallen recently and this is documented on 9/11/2024 by the post-acute care nurse practitioner. Patient was attempting to self transfer from the bed, ended up falling and landing on her knees. She denies any recent chills, headache, dizziness, chest pain / palpitations, shortness of breath, cough, abdominal pain, N/V/D/C, dysuria, or hematuria.    Of note, outpatient lab work from 9/12/2024 did show UA positive for nitrites and leukocyte esterase, RBC, and bacteria. WBC was within normal range on blood work from the same date.     ED Course:  Vital signs: Temp. 100.6 F, HR 78bpm, RR 18, /77, SPO2 95% on room air   CMP: glucose 130, Na+ 135, Cr. 1.08, EGFR 53, ALT <3.  CBC w/diff: WBC 16.1, H/H 12.2/37.6, Neutrophils 14.43  Lactate: 1.4  Blood cultures x 2 pending results  UA: + blood, ketones and protein. No nitrites or leukocyte esterase. No evidence of RBCs or WBCs  CXR: No focal airspace consolidation. Stable mild prominence of interstitial opacities  CT head: No  significant interval changes since previous exam. No evidence of acute cortical infarct or intracranial hemorrhage   Medications Given: 2g IV rocephin, Tylenol 975mg PO x 1  Disposition: Patient admitted for acute metabolic encephalopathy with hallucinations and management of UTI with positive results on outpatient lab and leukocytosis. Patient was also admitted on 9/1/2024 with similar presentation.    Past Medical History  She has a past medical history of Depression, Hyperlipidemia, Hypertension, Metabolic encephalopathy, and Parkinson's disease (Multi).    Surgical History  She has a past surgical history that includes Adenoidectomy and Tonsillectomy.     Social History  She reports that she has quit smoking. Her smoking use included cigarettes. She has never used smokeless tobacco. She reports that she does not drink alcohol and does not use drugs.    Family History  No family history on file.     Allergies  Sulfa (sulfonamide antibiotics) and Sulfamethoxazole-trimethoprim    Review of Systems   Constitutional:  Positive for fatigue and fever. Negative for chills.   HENT:  Negative for hearing loss, sore throat and trouble swallowing.    Eyes:  Negative for visual disturbance.   Respiratory:  Negative for cough, chest tightness and shortness of breath.    Cardiovascular:  Negative for chest pain and palpitations.   Gastrointestinal:  Negative for abdominal distention, abdominal pain, constipation, diarrhea, nausea and vomiting.   Endocrine: Negative for polydipsia, polyphagia and polyuria.   Genitourinary:  Negative for difficulty urinating, dysuria, flank pain, frequency, hematuria and urgency.   Musculoskeletal:  Positive for gait problem. Negative for back pain.   Skin:  Negative for color change, rash and wound.   Neurological:  Positive for weakness. Negative for dizziness, syncope, light-headedness and headaches.   Hematological:  Bruises/bleeds easily.   Psychiatric/Behavioral:  Positive for confusion  and hallucinations. Negative for agitation. The patient is not nervous/anxious.        Physical Exam  Vitals reviewed.   Constitutional:       General: She is not in acute distress.     Appearance: Normal appearance.   HENT:      Head: Normocephalic and atraumatic.      Right Ear: Hearing and external ear normal.      Left Ear: Hearing and external ear normal.      Nose: Nose normal.      Mouth/Throat:      Mouth: Mucous membranes are moist.      Pharynx: Oropharynx is clear.   Eyes:      General: Lids are normal. Vision grossly intact.      Extraocular Movements: Extraocular movements intact.      Conjunctiva/sclera: Conjunctivae normal.      Pupils: Pupils are equal, round, and reactive to light.      Visual Fields: Right eye visual fields normal and left eye visual fields normal.   Cardiovascular:      Rate and Rhythm: Normal rate and regular rhythm.      Pulses: Normal pulses.           Radial pulses are 2+ on the right side and 2+ on the left side.        Dorsalis pedis pulses are 2+ on the right side and 2+ on the left side.        Posterior tibial pulses are 2+ on the right side and 2+ on the left side.      Heart sounds: S1 normal and S2 normal. No murmur heard.     No S3 or S4 sounds.   Pulmonary:      Effort: Pulmonary effort is normal. No tachypnea or accessory muscle usage.      Breath sounds: Normal breath sounds and air entry. No stridor. No wheezing, rhonchi or rales.   Abdominal:      General: Bowel sounds are normal. There is no distension.      Palpations: Abdomen is soft.      Tenderness: There is no abdominal tenderness.   Musculoskeletal:      Cervical back: Full passive range of motion without pain and normal range of motion.      Right lower leg: No edema.      Left lower leg: No edema.   Skin:     General: Skin is warm and dry.      Capillary Refill: Capillary refill takes less than 2 seconds.      Coloration: Skin is not cyanotic or jaundiced.      Findings: Abrasion, bruising, ecchymosis,  "erythema and wound present. No rash.      Comments: Abrasions to bilateral knees  Left palm laceration s/p 9 sutures  Dorsal surface of left hand with injury with steri strips for wound closure  Left proximal forearm with erythema concerning for cellulitis given recent wound repair  Scattered bruising/ecchymosis noted on bilateral upper and lower extremities   Neurological:      General: No focal deficit present.      Mental Status: She is alert and oriented to person, place, and time.      Comments: Patient is confused at times during assessment but easily redirectable   Psychiatric:         Attention and Perception: She perceives visual hallucinations.         Mood and Affect: Mood and affect normal.         Speech: Speech normal.         Behavior: Behavior normal. Behavior is cooperative.         Cognition and Memory: Memory is impaired.       Last Recorded Vitals  Blood pressure 146/75, pulse 63, temperature 35.8 °C (96.4 °F), temperature source Temporal, resp. rate 18, height 1.702 m (5' 7\"), weight 67.4 kg (148 lb 9.4 oz), SpO2 96%.  Visit Vitals  /75 (BP Location: Left arm, Patient Position: Lying)   Pulse 63   Temp 35.8 °C (96.4 °F) (Temporal)   Resp 18   Ht 1.702 m (5' 7\")   Wt 67.4 kg (148 lb 9.4 oz)   SpO2 96%   BMI 23.27 kg/m²   OB Status Postmenopausal   Smoking Status Former   BSA 1.79 m²     Weight: 67.1 kg (148 lb)   Pain Assessment: 0-10  0-10 (Numeric) Pain Score: 0 - No pain      Relevant Results  Results for orders placed or performed during the hospital encounter of 09/12/24 (from the past 24 hour(s))   Comprehensive Metabolic Panel   Result Value Ref Range    Glucose 130 (H) 74 - 99 mg/dL    Sodium 135 (L) 136 - 145 mmol/L    Potassium 4.6 3.5 - 5.3 mmol/L    Chloride 102 98 - 107 mmol/L    Bicarbonate 24 21 - 32 mmol/L    Anion Gap 14 10 - 20 mmol/L    Urea Nitrogen 23 6 - 23 mg/dL    Creatinine 1.08 (H) 0.50 - 1.05 mg/dL    eGFR 53 (L) >60 mL/min/1.73m*2    Calcium 8.9 8.6 - 10.3 mg/dL "    Albumin 4.0 3.4 - 5.0 g/dL    Alkaline Phosphatase 78 33 - 136 U/L    Total Protein 6.6 6.4 - 8.2 g/dL    AST 13 9 - 39 U/L    Bilirubin, Total 0.9 0.0 - 1.2 mg/dL    ALT <3 (L) 7 - 45 U/L   Blood Culture    Specimen: Peripheral Venipuncture; Blood culture   Result Value Ref Range    Blood Culture Loaded on Instrument - Culture in progress    Urinalysis with Reflex Culture and Microscopic   Result Value Ref Range    Color, Urine Yellow Light-Yellow, Yellow, Dark-Yellow    Appearance, Urine Clear Clear    Specific Gravity, Urine 1.018 1.005 - 1.035    pH, Urine 6.0 5.0, 5.5, 6.0, 6.5, 7.0, 7.5, 8.0    Protein, Urine 30 (1+) (A) NEGATIVE, 10 (TRACE), 20 (TRACE) mg/dL    Glucose, Urine Normal Normal mg/dL    Blood, Urine 0.03 (TRACE) (A) NEGATIVE    Ketones, Urine 10 (1+) (A) NEGATIVE mg/dL    Bilirubin, Urine NEGATIVE NEGATIVE    Urobilinogen, Urine Normal Normal mg/dL    Nitrite, Urine NEGATIVE NEGATIVE    Leukocyte Esterase, Urine NEGATIVE NEGATIVE   Urinalysis Microscopic   Result Value Ref Range    WBC, Urine 1-5 1-5, NONE /HPF    RBC, Urine 1-2 NONE, 1-2, 3-5 /HPF    Squamous Epithelial Cells, Urine 1-9 (SPARSE) Reference range not established. /HPF    Mucus, Urine FEW Reference range not established. /LPF   CBC and Auto Differential   Result Value Ref Range    WBC 16.1 (H) 4.4 - 11.3 x10*3/uL    nRBC 0.0 0.0 - 0.0 /100 WBCs    RBC 3.94 (L) 4.00 - 5.20 x10*6/uL    Hemoglobin 12.2 12.0 - 16.0 g/dL    Hematocrit 37.6 36.0 - 46.0 %    MCV 95 80 - 100 fL    MCH 31.0 26.0 - 34.0 pg    MCHC 32.4 32.0 - 36.0 g/dL    RDW 11.9 11.5 - 14.5 %    Platelets 210 150 - 450 x10*3/uL    Neutrophils % 89.9 40.0 - 80.0 %    Immature Granulocytes %, Automated 0.4 0.0 - 0.9 %    Lymphocytes % 1.8 13.0 - 44.0 %    Monocytes % 7.5 2.0 - 10.0 %    Eosinophils % 0.0 0.0 - 6.0 %    Basophils % 0.4 0.0 - 2.0 %    Neutrophils Absolute 14.43 (H) 1.60 - 5.50 x10*3/uL    Immature Granulocytes Absolute, Automated 0.06 0.00 - 0.50 x10*3/uL     Lymphocytes Absolute 0.29 (L) 0.80 - 3.00 x10*3/uL    Monocytes Absolute 1.21 (H) 0.05 - 0.80 x10*3/uL    Eosinophils Absolute 0.00 0.00 - 0.40 x10*3/uL    Basophils Absolute 0.06 0.00 - 0.10 x10*3/uL   Lactate   Result Value Ref Range    Lactate 1.4 0.4 - 2.0 mmol/L   Blood Culture    Specimen: Peripheral Venipuncture; Blood culture   Result Value Ref Range    Blood Culture Loaded on Instrument - Culture in progress    Ammonia   Result Value Ref Range    Ammonia 22 16 - 53 umol/L      XR chest 1 view    Result Date: 9/12/2024  Interpreted By:  Roosevelt Richardson, STUDY: XR CHEST 1 VIEW;  9/12/2024 8:54 pm   INDICATION: Signs/Symptoms:sepsis.   COMPARISON: 8/31/2024   ACCESSION NUMBER(S): [id number]   ORDERING CLINICIAN: DEANNE MALONE   FINDINGS: There is stable cardiomegaly. There is stable mild prominence of interstitial opacities. Mild left basilar atelectasis. No pneumothorax.       Stable mild prominence of interstitial opacities.   No focal airspace consolidation.   MACRO: None   Signed by: Roosevelt Richardson 9/12/2024 9:14 PM Dictation workstation:   [id number]    CT head wo IV contrast    Result Date: 9/12/2024  Interpreted By:  Gaston Hauser, STUDY: CT HEAD WO IV CONTRAST;  9/12/2024 8:50 pm   INDICATION: Signs/Symptoms:fall, altered mental status.   COMPARISON: 09/01/2024   ACCESSION NUMBER(S): [id number]   ORDERING CLINICIAN: DEANNE MALONE   TECHNIQUE: Noncontrast axial CT scan of head was performed. Angled reformats in brain and bone windows were generated. The images were reviewed in bone, brain, blood and soft tissue windows.   FINDINGS: CSF Spaces: Stable brain atrophy evidence by prominence of ventricles, sulci and cisterns. There is no extraaxial fluid collection.   Parenchyma: Confluent areas of subcortical and periventricular white matter changes which given patient's age are suggestive of chronic small vessel ischemic disease. The grey-white differentiation is intact. There is no mass effect or midline  shift.  There is no intracranial hemorrhage.   Calvarium: The calvarium is unremarkable.   Paranasal sinuses and mastoids: Visualized paranasal sinuses and mastoids are clear.       No significant interval changes since previous exam. No evidence of acute cortical infarct or intracranial hemorrhage. If there is persistent clinical concern for acute cortical infarction, MRI with diffusion-weighted images is a better means for further evaluation as clinically warranted.   MACRO: None   Signed by: Gaston Hauser 9/12/2024 9:06 PM Dictation workstation:   [id number]        Home Medications  Prior to Admission medications    Medication Sig Start Date End Date Taking? Authorizing Provider   amantadine (Symmetrel) 100 mg tablet Take 1 tablet (100 mg) by mouth 2 times a day. Twice a day at 8a and noon 1/11/24 1/10/25  Sandi Coppola MD   carbidopa-levodopa (Sinemet CR)  mg ER tablet Take 1 tablet by mouth once daily at bedtime. 1/11/24 1/10/25  Sandi Coppola MD   carbidopa-levodopa (Sinemet)  mg tablet Take 2 tablets by mouth 4 times a day. At 8a, 11a, 2p & 5p 1/11/24 1/10/25  Sandi Coppola MD   cephalexin (Keflex) 250 mg capsule Take 2 capsules (500 mg) by mouth 3 times a day for 4 days. 9/3/24 9/7/24  Chet John MD   cyanocobalamin, vitamin B-12, (Vitamin B-12) 1,000 mcg tablet extended release Take 1 tablet (1,000 mcg) by mouth once daily. 4/27/21   Historical Provider, MD   oxybutynin XL (Ditropan-XL) 10 mg 24 hr tablet Take 1 tablet (10 mg) by mouth once daily. 4/27/21   Historical Provider, MD   polyethylene glycol (Glycolax, Miralax) 17 gram/dose powder Take by mouth once daily.    Historical Provider, MD   QUEtiapine (SEROquel) 25 mg tablet Take 1 tablet (25 mg) by mouth once daily at bedtime. 9/3/24 10/3/24  Chet John MD   venlafaxine XR (Effexor XR) 150 mg 24 hr capsule Take by mouth once daily.    Historical Provider, MD   venlafaxine XR (Effexor-XR) 75 mg 24 hr capsule Take 1  capsule (75 mg) by mouth. 11/2/21   Historical Provider, MD       Medications  Scheduled medications  cefTRIAXone, 2 g, intravenous, Daily      Continuous medications  sodium chloride 0.9%, 75 mL/hr      PRN medications  PRN medications: acetaminophen, melatonin, polyethylene glycol        Assessment/Plan   Assessment & Plan  Acute metabolic encephalopathy    Parkinson's disease (Multi)    Hallucinations    UTI (urinary tract infection)    Cellulitis of left forearm    Neutrophilic leukocytosis    EULALIA (acute kidney injury) (CMS-[facility])        Plan:  Code Status: Full Code. Attempted to complete code status discussion with legal guardian Alek Murrell over the phone, was unable to reach him at this time. Facility paper work states patient is a full code, will continue with current code status until confirmed with proxy.  Consult: Deferred to attending per preference.  ATB: 2g IV rocephin Q24H. Contacted pharmacy due to concerns of left arm cellulitis to verify coverage with rocephin. Notified it is acceptable to continue with Rocephin if it is not purulent or risk for MRSA. Patient does have sutures to left palm which are clean, dry, and intact and a wound on left wrist with steri-strips but without purulence of drainage. However, there is erythema extending from left wrist to lower forearm.  IVFs: 0.9% sodium chloride at 75 mL/hr for 12 hours  Meds: Tylenol 650 mg p.o. every 6 hours as needed for pain 1-6/headaches, melatonin 3 milligrams p.o. daily as needed for insomnia, MiraLAX 17 gms p.o daily as needed for constipation.  Caution with nephrotoxic medications  - Patient has mild EULALIA which is likely in the setting of recent UTI. Will treat with IV fluids and monitor with AM labwork  Diet: Cardiac  Telemetry monitoring   Vital signs with BP, HR, & RR Q 4 hours.  Pulse ox Q 4 hours.   Admission height and weight.  Labs ordered: CBC, BMP, Mg, Phos. PT/INR. Ammonia.  Test ordered: Deferred to attending.  Monitor / trend  labs while inpatient.  Replace electrolytes as needed.  Transfuse with PRBC for hemoglobin<7.0   Aspiration precautions.  Fall precautions   Out of bed with assistance   PT/OT eval and treat as indicated  Call physician for temperature < 36.5 or >38.0 degrees celsius.  Call physician for pulse <50 or >120.  Call physician for respiratory rate <10 or >22.  Call physician for systolic blood pressure <90 or >170.  Call physician for diastolic blood pressure < 50 or >100.  Call physician for pulse ox <92%.  See orders for further plan of care ordered.     VTE prophylaxis:   Pharmacological prophylaxis has patient has history of recent falls/high fall risk.  BLE SCDs.  Monitor for s/s of bleeding    Medication reconciliation to be completed when nursing/pharmacy  are able to verify patient's accurate home medications.    See additional orders for further plan of care. Any further evaluation and management per attending and consulting physicians.      Any information found to be copied from previous providers is done in the best interest of the patient to provide accurate, quality, and continuity of care.     I spent 60 minutes in the professional and overall care of this patient.      Jami Rocha, GARETH-CNP  [facility]

## 2024-09-13 NOTE — H&P
History Of Present Illness  Jennifer Murrell is a 78 y.o. female presenting with past medical history of hypertension depression admitted to the hospital secondary to hallucination from the rehab center patient noticed to have hallucination confusion and aggressive behavior towards the staff at the facility patient normally alert oriented x 3 patient seen in emergency room urine test was positive for urinary tract infection patient recently had a fall and laceration of the left hand patient had a history of dementia.     Past Medical History  She has a past medical history of Dementia (Multi), Depression, Hyperlipidemia, Hypertension, Metabolic encephalopathy, Parkinson's disease (Multi), and Recurrent falls.    Surgical History  She has a past surgical history that includes Adenoidectomy and Tonsillectomy.     Social History  She reports that she has quit smoking. Her smoking use included cigarettes. She has never used smokeless tobacco. She reports that she does not drink alcohol and does not use drugs.    Family History  No family history on file.     Allergies  Sulfa (sulfonamide antibiotics) and Sulfamethoxazole-trimethoprim    Review of Systems   Constitutional:  Negative for diaphoresis and fatigue.   HENT:  Negative for ear pain, facial swelling, tinnitus and trouble swallowing.    Eyes:  Negative for photophobia and visual disturbance.   Respiratory:  Negative for choking and stridor.    Cardiovascular:  Negative for chest pain and palpitations.   Gastrointestinal:  Negative for abdominal pain, blood in stool and diarrhea.   Endocrine: Negative for cold intolerance, heat intolerance, polydipsia and polyuria.   Musculoskeletal:  Negative for back pain and joint swelling.   Skin:  Negative for color change and rash.   Allergic/Immunologic: Negative for food allergies.   Neurological:  Negative for tremors, facial asymmetry and weakness.   Psychiatric/Behavioral:  The patient is not hyperactive.       Physical  "Exam  HENT:      Right Ear: External ear normal.      Left Ear: External ear normal.      Mouth/Throat:      Mouth: Mucous membranes are moist.   Cardiovascular:      Rate and Rhythm: Normal rate and regular rhythm.      Heart sounds: No murmur heard.     No friction rub. No gallop.   Pulmonary:      Effort: No accessory muscle usage or respiratory distress.      Breath sounds: No stridor. No wheezing or rhonchi.   Chest:      Chest wall: No tenderness.   Abdominal:      General: There is no distension.      Palpations: There is no mass.      Tenderness: There is no abdominal tenderness. There is no guarding or rebound.   Musculoskeletal:         General: No deformity or signs of injury.      Cervical back: No rigidity or tenderness. Normal range of motion.      Right lower leg: No edema.      Left lower leg: No edema.   Skin:     Coloration: Skin is not jaundiced or pale.      Findings: No lesion.   Neurological:   Alert but confused       Last Recorded Vitals  Blood pressure 127/65, pulse 60, temperature 36.2 °C (97.2 °F), temperature source Temporal, resp. rate 18, height 1.702 m (5' 7\"), weight 67.4 kg (148 lb 9.4 oz), SpO2 98%.    Labs    Admission on 09/12/2024   Component Date Value Ref Range Status    WBC 09/12/2024 16.1 (H)  4.4 - 11.3 x10*3/uL Final    nRBC 09/12/2024 0.0  0.0 - 0.0 /100 WBCs Final    RBC 09/12/2024 3.94 (L)  4.00 - 5.20 x10*6/uL Final    Hemoglobin 09/12/2024 12.2  12.0 - 16.0 g/dL Final    Hematocrit 09/12/2024 37.6  36.0 - 46.0 % Final    MCV 09/12/2024 95  80 - 100 fL Final    MCH 09/12/2024 31.0  26.0 - 34.0 pg Final    MCHC 09/12/2024 32.4  32.0 - 36.0 g/dL Final    RDW 09/12/2024 11.9  11.5 - 14.5 % Final    Platelets 09/12/2024 210  150 - 450 x10*3/uL Final    Neutrophils % 09/12/2024 89.9  40.0 - 80.0 % Final    Immature Granulocytes %, Automated 09/12/2024 0.4  0.0 - 0.9 % Final    Immature Granulocyte Count (IG) includes promyelocytes, myelocytes and metamyelocytes but does not " include bands. Percent differential counts (%) should be interpreted in the context of the absolute cell counts (cells/UL).    Lymphocytes % 09/12/2024 1.8  13.0 - 44.0 % Final    Monocytes % 09/12/2024 7.5  2.0 - 10.0 % Final    Eosinophils % 09/12/2024 0.0  0.0 - 6.0 % Final    Basophils % 09/12/2024 0.4  0.0 - 2.0 % Final    Neutrophils Absolute 09/12/2024 14.43 (H)  1.60 - 5.50 x10*3/uL Final    Percent differential counts (%) should be interpreted in the context of the absolute cell counts (cells/uL).    Immature Granulocytes Absolute, Au* 09/12/2024 0.06  0.00 - 0.50 x10*3/uL Final    Lymphocytes Absolute 09/12/2024 0.29 (L)  0.80 - 3.00 x10*3/uL Final    Monocytes Absolute 09/12/2024 1.21 (H)  0.05 - 0.80 x10*3/uL Final    Eosinophils Absolute 09/12/2024 0.00  0.00 - 0.40 x10*3/uL Final    Basophils Absolute 09/12/2024 0.06  0.00 - 0.10 x10*3/uL Final    Glucose 09/12/2024 130 (H)  74 - 99 mg/dL Final    Sodium 09/12/2024 135 (L)  136 - 145 mmol/L Final    Potassium 09/12/2024 4.6  3.5 - 5.3 mmol/L Final    MODERATE HEMOLYSIS DETECTED. The result may be falsely elevated due to hemolysis or other interferents. Clinical correlation is recommended. Repeat testing may be considered.    Chloride 09/12/2024 102  98 - 107 mmol/L Final    Bicarbonate 09/12/2024 24  21 - 32 mmol/L Final    Anion Gap 09/12/2024 14  10 - 20 mmol/L Final    Urea Nitrogen 09/12/2024 23  6 - 23 mg/dL Final    Creatinine 09/12/2024 1.08 (H)  0.50 - 1.05 mg/dL Final    eGFR 09/12/2024 53 (L)  >60 mL/min/1.73m*2 Final    Calculations of estimated GFR are performed using the 2021 CKD-EPI Study Refit equation without the race variable for the IDMS-Traceable creatinine methods.  https://jasn.asnjournals.org/content/early/2021/09/22/ASN.1622276719    Calcium 09/12/2024 8.9  8.6 - 10.3 mg/dL Final    Albumin 09/12/2024 4.0  3.4 - 5.0 g/dL Final    Alkaline Phosphatase 09/12/2024 78  33 - 136 U/L Final    Total Protein 09/12/2024 6.6  6.4 - 8.2  g/dL Final    AST 09/12/2024 13  9 - 39 U/L Final    MODERATE HEMOLYSIS DETECTED. The result may be falsely elevated due to hemolysis or other interferents. Clinical correlation is recommended. Repeat testing may be considered.    Bilirubin, Total 09/12/2024 0.9  0.0 - 1.2 mg/dL Final    ALT 09/12/2024 <3 (L)  7 - 45 U/L Final    Patients treated with Sulfasalazine may generate falsely decreased results for ALT.    Lactate 09/12/2024 1.4  0.4 - 2.0 mmol/L Final    Blood Culture 09/12/2024 Loaded on Instrument - Culture in progress   Preliminary    Blood Culture 09/12/2024 Loaded on Instrument - Culture in progress   Preliminary    Color, Urine 09/12/2024 Yellow  Light-Yellow, Yellow, Dark-Yellow Final    Appearance, Urine 09/12/2024 Clear  Clear Final    Specific Gravity, Urine 09/12/2024 1.018  1.005 - 1.035 Final    pH, Urine 09/12/2024 6.0  5.0, 5.5, 6.0, 6.5, 7.0, 7.5, 8.0 Final    Protein, Urine 09/12/2024 30 (1+) (A)  NEGATIVE, 10 (TRACE), 20 (TRACE) mg/dL Final    Glucose, Urine 09/12/2024 Normal  Normal mg/dL Final    Blood, Urine 09/12/2024 0.03 (TRACE) (A)  NEGATIVE Final    Ketones, Urine 09/12/2024 10 (1+) (A)  NEGATIVE mg/dL Final    Bilirubin, Urine 09/12/2024 NEGATIVE  NEGATIVE Final    Urobilinogen, Urine 09/12/2024 Normal  Normal mg/dL Final    Nitrite, Urine 09/12/2024 NEGATIVE  NEGATIVE Final    Leukocyte Esterase, Urine 09/12/2024 NEGATIVE  NEGATIVE Final    Extra Tube 09/12/2024 Hold for add-ons.   Final    Auto resulted.    WBC, Urine 09/12/2024 1-5  1-5, NONE /HPF Final    RBC, Urine 09/12/2024 1-2  NONE, 1-2, 3-5 /HPF Final    Squamous Epithelial Cells, Urine 09/12/2024 1-9 (SPARSE)  Reference range not established. /HPF Final    Mucus, Urine 09/12/2024 FEW  Reference range not established. /LPF Final    Ammonia 09/12/2024 22  16 - 53 umol/L Final    Glucose 09/13/2024 86  74 - 99 mg/dL Final    Sodium 09/13/2024 139  136 - 145 mmol/L Final    Potassium 09/13/2024 3.3 (L)  3.5 - 5.3 mmol/L  Final    Chloride 09/13/2024 101  98 - 107 mmol/L Final    Bicarbonate 09/13/2024 28  21 - 32 mmol/L Final    Anion Gap 09/13/2024 13  10 - 20 mmol/L Final    Urea Nitrogen 09/13/2024 21  6 - 23 mg/dL Final    Creatinine 09/13/2024 0.91  0.50 - 1.05 mg/dL Final    eGFR 09/13/2024 66  >60 mL/min/1.73m*2 Final    Calculations of estimated GFR are performed using the 2021 CKD-EPI Study Refit equation without the race variable for the IDMS-Traceable creatinine methods.  https://jasn.asnjournals.org/content/early/2021/09/22/ASN.4884213293    Calcium 09/13/2024 8.6  8.6 - 10.3 mg/dL Final    WBC 09/13/2024 11.6 (H)  4.4 - 11.3 x10*3/uL Final    nRBC 09/13/2024 0.0  0.0 - 0.0 /100 WBCs Final    RBC 09/13/2024 3.77 (L)  4.00 - 5.20 x10*6/uL Final    Hemoglobin 09/13/2024 11.6 (L)  12.0 - 16.0 g/dL Final    Hematocrit 09/13/2024 36.5  36.0 - 46.0 % Final    MCV 09/13/2024 97  80 - 100 fL Final    MCH 09/13/2024 30.8  26.0 - 34.0 pg Final    MCHC 09/13/2024 31.8 (L)  32.0 - 36.0 g/dL Final    RDW 09/13/2024 12.0  11.5 - 14.5 % Final    Platelets 09/13/2024 212  150 - 450 x10*3/uL Final    Magnesium 09/13/2024 2.39  1.60 - 2.40 mg/dL Final    Protime 09/13/2024 12.1  9.8 - 12.8 seconds Final    INR 09/13/2024 1.1  0.9 - 1.1 Final    Phosphorus 09/13/2024 3.2  2.5 - 4.9 mg/dL Final    The performance characteristics of phosphorus testing in heparinized plasma have been validated by the individual  laboratory site where testing is performed. Testing on heparinized plasma is not approved by the FDA; however, such approval is not necessary.         Imaging     XR chest 1 view  Narrative: Interpreted By:  Roosevelt Richardson,   STUDY:  XR CHEST 1 VIEW;  9/12/2024 8:54 pm      INDICATION:  Signs/Symptoms:sepsis.      COMPARISON:  8/31/2024      ACCESSION NUMBER(S):  [id number]      ORDERING CLINICIAN:  DEANNE MALONE      FINDINGS:  There is stable cardiomegaly. There is stable mild prominence of  interstitial opacities. Mild left  basilar atelectasis. No  pneumothorax.      Impression: Stable mild prominence of interstitial opacities.      No focal airspace consolidation.      MACRO:  None      Signed by: Roosevelt Richardson 9/12/2024 9:14 PM  Dictation workstation:   [id number]  CT head wo IV contrast  Narrative: Interpreted By:  Gaston Hauser,   STUDY:  CT HEAD WO IV CONTRAST;  9/12/2024 8:50 pm      INDICATION:  Signs/Symptoms:fall, altered mental status.      COMPARISON:  09/01/2024      ACCESSION NUMBER(S):  [id number]      ORDERING CLINICIAN:  DEANNE MALONE      TECHNIQUE:  Noncontrast axial CT scan of head was performed. Angled reformats in  brain and bone windows were generated. The images were reviewed in  bone, brain, blood and soft tissue windows.      FINDINGS:  CSF Spaces: Stable brain atrophy evidence by prominence of  ventricles, sulci and cisterns. There is no extraaxial fluid  collection.      Parenchyma: Confluent areas of subcortical and periventricular white  matter changes which given patient's age are suggestive of chronic  small vessel ischemic disease. The grey-white differentiation is  intact. There is no mass effect or midline shift.  There is no  intracranial hemorrhage.      Calvarium: The calvarium is unremarkable.      Paranasal sinuses and mastoids: Visualized paranasal sinuses and  mastoids are clear.      Impression: No significant interval changes since previous exam. No evidence of  acute cortical infarct or intracranial hemorrhage. If there is  persistent clinical concern for acute cortical infarction, MRI with  diffusion-weighted images is a better means for further evaluation as  clinically warranted.      MACRO:  None      Signed by: Gaston Hauser 9/12/2024 9:06 PM  Dictation workstation:   [id number]       Patient Active Problem List   Diagnosis    Abnormality of gait    Back stiffness    Compression fracture of L1 vertebra (Multi)    Constipation    HLD (hyperlipidemia)    HTN (hypertension)    Leg  weakness    Lower back pain    Palpitations    Parkinson's disease (Multi)    Acute metabolic encephalopathy    Acute cystitis without hematuria    Hallucinations    Fall    UTI (urinary tract infection)    Cellulitis of left forearm    Neutrophilic leukocytosis    EULALIA (acute kidney injury) (CMS-[facility])         Assessment/Plan   Assessment & Plan  Acute metabolic encephalopathy    Parkinson's disease (Multi)    Hallucinations    UTI (urinary tract infection)    Cellulitis of left forearm    Neutrophilic leukocytosis    EULALIA (acute kidney injury) (CMS-[facility])      Acute metabolic encephalopathy related to combination of her baseline dementia plus urinary tract infection questionable cellulitis over the left arm patient started on antibiotic patient had mild rash unclear if it is related to the Rocephin or she had it from before we will start her on Benadryl monitor any future reaction to the Rocephin  EULALIA patient started on IV fluid  History of Parkinson disease continue to monitor       I spent 45 minutes in the professional and overall care of this patient.      DARRIAN Hayes MD

## 2024-09-13 NOTE — PROGRESS NOTES
Occupational Therapy    Occupational Therapy    Evaluation    Patient Name: Jennifer Murrell  MRN: [id number]  Today's Date: 9/13/2024  Time Calculation  Start Time: 0929  Stop Time: 0945  Time Calculation (min): 16 min  3006/3006-A    Assessment  IP OT Assessment  OT Assessment:  (Pt. presents with decreased balance and endurance impacting pt. ability to complet ADLs and mobility independently)  Prognosis: Fair  Evaluation/Treatment Tolerance: Patient limited by fatigue  End of Session Communication: Bedside nurse  End of Session Patient Position: Bed, 3 rail up, Alarm on    Plan:  Treatment Interventions: ADL retraining, Functional transfer training, Cognitive reorientation, Endurance training  OT Frequency: 3 times per week  OT Discharge Recommendations: Moderate intensity level of continued care  Equipment Recommended upon Discharge: Wheeled walker  OT Recommended Transfer Status: Moderate assist  OT - OK to Discharge: Yes (Next level of care when cleared by medical team)    Subjective   Per EMR: Jennifer Murrell is a 78 y.o. female presenting with past medical history of hypertension depression admitted to the hospital secondary to hallucination from the rehab center patient noticed to have hallucination confusion and aggressive behavior towards the staff at the facility patient normally alert oriented x 3 patient seen in emergency room urine test was positive for urinary tract infection patient recently had a fall and laceration of the left hand patient had a history of dementia.     Past Medical History  She has a past medical history of Dementia (Multi), Depression, Hyperlipidemia, Hypertension, Metabolic encephalopathy, Parkinson's disease (Multi), and Recurrent falls.     Surgical History  She has a past surgical history that includes Adenoidectomy and Tonsillectomy.  Current Problem:  1. Acute metabolic encephalopathy        2. Lower urinary tract infectious disease            General:  General  Reason for  Referral: ADLs, discharge planning  Referred By: Dr. Hayes  Family/Caregiver Present: No  Co-Treatment: PT  Co-Treatment Reason: Safety  Prior to Session Communication: Bedside nurse  Patient Position Received: Bed, 3 rail up, Alarm on    Precautions:  Medical Precautions: Fall precautions        Pain:  Pain Assessment  Pain Assessment: 0-10  0-10 (Numeric) Pain Score: 0 - No pain    Objective     Cognition:  Orientation Level: Oriented X4  Cognition Comments:  (Hallucinating and agitiated at times during stay)  Insight: Mild  Impulsive: Mildly          Home Living:  Home Living Comments:  (Pt. admitted from [place]. typically lives alone in home with 3 entry steps with bedroom and bathoom on first floor with walk in shower.)     Prior Function:  Level of [place]: Needs assistance with ADLs  Receives Help From: Personal care attendant  ADL Assistance: Needs assistance  Homemaking Assistance: Needs assistance  Ambulatory Assistance: Needs assistance         ADL:  Grooming Assistance: Stand by  LE Dressing Assistance: Moderate    Activity Tolerance:  Endurance: Tolerates 10 - 20 min exercise with multiple rests    Bed Mobility/Transfers:   Bed Mobility  Bed Mobility:  (supine to sit min assist)  Transfers  Transfer:  (sit<> stand mod assist x 2; retropulisve, uncoordinated)    Ambulation/Gait Training:  Functional Mobility  Functional Mobility Performed:  (Functinal mobility with ww and gait belt mod assist; scissoring gait and narrow JOSEE; makes cues for safety)    Sitting Balance:  Static Sitting Balance  Static Sitting-Balance Support: Bilateral upper extremity supported  Static Sitting-Level of Assistance: Close supervision    Standing Balance:  Static Standing Balance  Static Standing-Balance Support: Bilateral upper extremity supported  Static Standing-Level of Assistance: Moderate assistance      Sensation:  Sensation Comment: Denies numbness        Coordination:  Coordination Comment: Decreased  coordination when reaching for ww and during mobility     Hand Function:  Hand Function  Gross Grasp: Functional  Coordination: Functional    Extremities: RUE   RUE : Within Functional Limits and LUE   LUE: Within Functional Limits    Outcome Measures: [facility] Daily Activity  Putting on and taking off regular lower body clothing: A lot  Bathing (including washing, rinsing, drying): A lot  Putting on and taking off regular upper body clothing: A little  Toileting, which includes using toilet, bedpan or urinal: A little  Taking care of personal grooming such as brushing teeth: A little  Eating Meals: A little  Daily Activity - Total Score: 16          EDUCATION:  Education  Individual(s) Educated: Patient  Education Provided: Fall precautons, Risk and benefits of OT discussed with patient or other, POC discussed and agreed upon  Patient Response to Education: Patient/Caregiver Verbalized Understanding of Information      Goals:   Encounter Problems       Encounter Problems (Active)       Dressings Lower Extremities       STG - Patient to complete lower body dressing SUP       Start:  09/13/24    Expected End:  09/27/24               Functional Balance       LTG - Patient will maintain standing and sitting balance to allow for completion of daily activities       Start:  09/13/24    Expected End:  09/27/24               Grooming       STG - Patient completes grooming SUP       Start:  09/13/24    Expected End:  09/27/24               OT Transfers       STG - Patient will transfer sit to and from stand SUP       Start:  09/13/24    Expected End:  09/27/24

## 2024-09-13 NOTE — CARE PLAN
Patient alert and oriented with confusion. Patient incontinent of urine and combative with care. Patient difficult to redirect and safety reminders and assistance with all ADLs. Patient impulsive and has unsteady walk.     The patient's goals for the shift include see plan of care    The clinical goals for the shift include patient will not fall this shift      Problem: Skin  Goal: Decreased wound size/increased tissue granulation at next dressing change  Outcome: Progressing  Goal: Participates in plan/prevention/treatment measures  Outcome: Progressing  Goal: Prevent/manage excess moisture  Outcome: Progressing  Goal: Prevent/minimize sheer/friction injuries  Outcome: Progressing  Goal: Promote/optimize nutrition  Outcome: Progressing  Goal: Promote skin healing  Outcome: Progressing     Problem: Pain - Adult  Goal: Verbalizes/displays adequate comfort level or baseline comfort level  Outcome: Progressing     Problem: Safety - Adult  Goal: Free from fall injury  Outcome: Progressing     Problem: Discharge Planning  Goal: Discharge to home or other facility with appropriate resources  Outcome: Progressing     Problem: Chronic Conditions and Co-morbidities  Goal: Patient's chronic conditions and co-morbidity symptoms are monitored and maintained or improved  Outcome: Progressing     Problem: Respiratory  Goal: No signs of respiratory distress (eg. Use of accessory muscles. Peds grunting)  Outcome: Progressing  Goal: Increase self care and/or family involvement in next 24 hours  Outcome: Progressing     Problem: Pain  Goal: Takes deep breaths with improved pain control throughout the shift  Outcome: Met  Goal: Turns in bed with improved pain control throughout the shift  Outcome: Met  Goal: Walks with improved pain control throughout the shift  Outcome: Met  Goal: Performs ADL's with improved pain control throughout shift  Outcome: Met  Goal: Participates in PT with improved pain control throughout the  shift  Outcome: Met  Goal: Free from opioid side effects throughout the shift  Outcome: Met  Goal: Free from acute confusion related to pain meds throughout the shift  Outcome: Met     Problem: Respiratory  Goal: Clear secretions with interventions this shift  Outcome: Met  Goal: Minimize anxiety/maximize coping throughout shift  Outcome: Met  Goal: Minimal/no exertional discomfort or dyspnea this shift  Outcome: Met  Goal: Patent airway maintained this shift  Outcome: Met  Goal: Tolerate mechanical ventilation evidenced by VS/agitation level this shift  Outcome: Met  Goal: Tolerate pulmonary toileting this shift  Outcome: Met  Goal: Verbalize decreased shortness of breath this shift  Outcome: Met  Goal: Wean oxygen to maintain O2 saturation per order/standard this shift  Outcome: Met

## 2024-09-13 NOTE — NURSING NOTE
EOS Note Pt has remained HDS throughout shift. Pt has LR's running at 70mL/hr in left wrist. Pt remains on room air, 2 assist to BSC, tele/nsr, AO x 1. Pt has been impulsive, tries to get up, insists on going home. Currently pt is sitting in chair at nurses desk, eating dinner. Alarms are on and pt is resting comfortably.

## 2024-09-13 NOTE — PROGRESS NOTES
Attempt to call patient's so, Alek, no answer. Left message for him to call back regarding discharge planning.

## 2024-09-13 NOTE — PROGRESS NOTES
Physical Therapy    Physical Therapy Evaluation    Patient Name: Jennifer Murrell  MRN: [id number]  Today's Date: 9/13/2024   Time Calculation  Start Time: 0930  Stop Time: 0944  Time Calculation (min): 14 min  3006/3006-A    Assessment/Plan   PT Assessment  PT Assessment Results: Decreased strength, Decreased range of motion, Decreased endurance, Impaired balance, Decreased mobility, Decreased safety awareness, Decreased coordination, Decreased cognition, Impaired judgement  Rehab Prognosis: Good  Evaluation/Treatment Tolerance: Patient tolerated treatment well  Medical Staff Made Aware: Yes  End of Session Communication: Bedside nurse  Assessment Comment: Pt is a 77 y/o female admitted for acute metabolic encephalopathy. Pt presents with poor safety awareness/judgement, decreased strength, endurance and balance. Pt able to tolerate bed mobility, transfers and ambulating in room. She demos significant instability on feet and is an increased risk for falls. She is functioning below baseline level of function and will benefit from skilled therapy during stay to improve overall functional mobility and safety awareness. Upon discharge pt will benefit from moderate intensity therapy with 24 hr supervision 2/2 cognition for continued improvement in functional mobility.     End of Session Patient Position: Up in chair, Alarm on (call light within reach)  IP OR SWING BED PT PLAN  Inpatient or Swing Bed: Inpatient  PT Plan  Treatment/Interventions: Bed mobility, Transfer training, Gait training, Stair training, Balance training, Neuromuscular re-education, Endurance training, Strengthening, Range of motion, Therapeutic exercise, Therapeutic activity, Home exercise program, Positioning, Postural re-education  PT Plan: Ongoing PT  PT Frequency: 4 times per week  PT Discharge Recommendations: Moderate intensity level of continued care, 24 hr supervision due to cognition  Equipment Recommended upon Discharge: Wheeled walker  PT  Recommended Transfer Status: Assist x1, Assistive device (mod A)  PT - OK to Discharge: Yes (Once medically cleared to next level of care.)    Subjective     Current Problem:  1. Acute metabolic encephalopathy        2. Lower urinary tract infectious disease          Patient Active Problem List   Diagnosis    Abnormality of gait    Back stiffness    Compression fracture of L1 vertebra (Multi)    Constipation    HLD (hyperlipidemia)    HTN (hypertension)    Leg weakness    Lower back pain    Palpitations    Parkinson's disease (Multi)    Acute metabolic encephalopathy    Acute cystitis without hematuria    Hallucinations    Fall    UTI (urinary tract infection)    Cellulitis of left forearm    Neutrophilic leukocytosis    EULALIA (acute kidney injury) (CMS-[facility])     General Visit Information:  General  Reason for Referral: P/w AMS and fever. Per ER note pt mental status worse than baseline and aggressive behavior noted at her facility. Pt admitted for acute metabolic encephalopathy.  Referred By: Dr. Hayes  Family/Caregiver Present: No  Co-Treatment: OT  Co-Treatment Reason: To maximize pt safety with functional mobility and discharge planning  Prior to Session Communication: Bedside nurse  Patient Position Received: Bed, 3 rail up, Alarm on (RN present)  Preferred Learning Style: verbal  General Comment: Pt pleasant and agreeable to work with therapy. Pt demos episodes of head and UE tremors (baseline PD)    Home Living:  Home Living  Home Living Comments: Pt admitted from [facility]. This PT evaluated pt earlier this month (9/3) and pt lived home alone with first floor set up, ~3-4 MAXWELL to enter with HR, WIS. Pt independent with ADLs, IADLs and used RW for mobility. Family provided transportation. Pt involved with exercise classes for PD patients at  2-3x/week.    Prior Level of Function:  Prior Function Per Pt/Caregiver Report  Level of [place]: Independent with ADLs and functional  "transfers  Receives Help From: Family  ADL Assistance: Independent  Homemaking Assistance: Independent  Ambulatory Assistance: Independent (Mod I with RW which pt reports she only uses \"when her kids are around\" pt also furniture walks in home)  Prior Function Comments: Pt has had multiple falls    Precautions:  Precautions  Medical Precautions: Fall precautions    Objective     Pain:  Pain Assessment  Pain Assessment: 0-10  0-10 (Numeric) Pain Score: 0 - No pain    Cognition:  Cognition  Overall Cognitive Status: Within Functional Limits  Orientation Level: Oriented X4  Cognition Comments: Per EMR pt has been hallucinating and combative with care this admission  Insight: Mild    General Assessments:  General Observation  General Observation: L hand wrapped in bandages, bruises on BL LEs   Activity Tolerance  Endurance: Tolerates 10 - 20 min exercise with multiple rests  Sensation  Light Touch: No apparent deficits  Sensation Comment: Pt denies any n/t.     Coordination  Coordination Comment: Demos impaired coordination with ambulation     Static Sitting Balance  Static Sitting-Balance Support: Bilateral upper extremity supported, Feet supported  Static Sitting-Level of Assistance: Contact guard  Static Standing Balance  Static Standing-Balance Support: Bilateral upper extremity supported  Static Standing-Level of Assistance: Moderate assistance  Dynamic Standing Balance  Dynamic Standing-Balance Support: Bilateral upper extremity supported  Dynamic Standing-Level of Assistance: Moderate assistance    Functional Assessments:     Bed Mobility  Bed Mobility: Yes  Bed Mobility 1  Bed Mobility 1: Supine to sitting, Scooting  Level of Assistance 1: Minimum assistance, Minimal verbal cues  Bed Mobility Comments 1: HOB elevated and use of bed HR. Pt required cues for proper hand placement and body mechanics to complete transition.    Transfers  Transfer: Yes  Transfer 1  Transfer From 1: Bed to  Transfer to 1: " Stand  Technique 1: Sit to stand  Transfer Device 1: Walker, Gait belt  Transfer Level of Assistance 1: Moderate assistance, Moderate verbal cues  Trials/Comments 1: Pt slow to rise and significantly unsteady. VCs for proper hand placement and body mechanics.  Transfers 2  Transfer From 2: Stand to  Transfer to 2: Chair with arms  Technique 2: Stand to sit  Transfer Device 2: Walker, Gait belt  Transfer Level of Assistance 2: Moderate assistance, Moderate verbal cues  Trials/Comments 2: VCs for proper hand placement and positioning of FWW in relation to chair. Pt demos poor safety awareness.    Ambulation/Gait Training  Ambulation/Gait Training Performed: Yes  Ambulation/Gait Training 1  Surface 1: Level tile  Device 1: Rolling walker  Gait Support Devices: Gait belt  Assistance 1: Moderate assistance, Maximum verbal cues  Quality of Gait 1: Narrow base of support, Diminished heel strike, Inconsistent stride length, Decreased step length, Shuffling gait, Scissoring, Antalgic (decreased jose, very NBOS with scissoring of BL LEs, significant instability, insufficient foot clearance, poor safety awareness, unsteady with directional turns)  Comments/Distance (ft) 1: ~20 ft with reciprocal gait pattern; max VCs for proper body mechanics, proper gait mechanics and safety awareness with FWW     Extremity/Trunk Assessments:     AROM RLE (degrees)  RLE AROM Comment: WFL  Strength RLE  R Hip Flexion: 4/5  R Hip ABduction: 4/5  R Hip ADduction: 4/5  R Knee Flexion: 3+/5  R Knee Extension: 4/5  R Ankle Dorsiflexion: 4/5  R Ankle Plantar Flexion: 4/5    AROM LLE (degrees)  LLE AROM Comment: WFL  Strength LLE  L Hip Flexion: 4/5  L Hip ABduction: 4/5  L Hip ADduction: 4/5  L Knee Flexion: 3+/5  L Knee Extension: 4/5  L Ankle Dorsiflexion: 4/5  L Ankle Plantar Flexion: 4/5    Outcome Measures:     [facility] Basic Mobility  Turning from your back to your side while in a flat bed without using bedrails: A little  Moving from lying on  your back to sitting on the side of a flat bed without using bedrails: A little  Moving to and from bed to chair (including a wheelchair): A lot  Standing up from a chair using your arms (e.g. wheelchair or bedside chair): A lot  To walk in hospital room: A lot  Climbing 3-5 steps with railing: Total  Basic Mobility - Total Score: 13    Goals:  Encounter Problems       Encounter Problems (Active)       Balance       Pt will demonstrate static/dynamic standing balance for >/= 5 min CGA without evidence of instability or LOB with functional mobility tasks.         Start:  09/13/24    Expected End:  09/27/24               Mobility       Pt will be able to ambulate >/= 50 ft with LRD CGA with good safety awareness.        Start:  09/13/24    Expected End:  09/27/24            Pt will complete supine, seated and standing exercises to maintain/improve overall strength with minimal verbal cues.         Start:  09/13/24    Expected End:  09/27/24               PT Transfers       Pt will be able to complete all bed mobility tasks mod I.       Start:  09/13/24    Expected End:  09/27/24            Pt will be able to complete all transfers with LRD CGA demonstrating good safety awareness and proper body mechanics.        Start:  09/13/24    Expected End:  09/27/24                 Education Documentation  Body Mechanics, taught by Falguni Vides PT at 9/13/2024 11:11 AM.  Learner: Patient  Readiness: Acceptance  Method: Explanation  Response: Verbalizes Understanding, Demonstrated Understanding, Needs Reinforcement    Home Exercise Program, taught by Falguni Vides PT at 9/13/2024 11:11 AM.  Learner: Patient  Readiness: Acceptance  Method: Explanation  Response: Verbalizes Understanding, Demonstrated Understanding, Needs Reinforcement    Mobility Training, taught by Falguni Vides PT at 9/13/2024 11:11 AM.  Learner: Patient  Readiness: Acceptance  Method: Explanation  Response: Verbalizes Understanding,  Demonstrated Understanding, Needs Reinforcement    Education Comments  No comments found.

## 2024-09-13 NOTE — PROGRESS NOTES
09/13/24 1154   Discharge Planning   Living Arrangements Alone   Support Systems Children   Assistance Needed yes   Type of Residence Skilled nursing facility   Home or Post Acute Services Post acute facilities (Rehab/SNF/etc)   Type of Post Acute Facility Services Skilled nursing   Expected Discharge Disposition SNF   Does the patient need discharge transport arranged? Yes   RoundTrip coordination needed? Yes   Has discharge transport been arranged? No   Housing Stability   In the last 12 months, was there a time when you were not able to pay the mortgage or rent on time? N   At any time in the past 12 months, were you homeless or living in a shelter (including now)? N   Transportation Needs   In the past 12 months, has lack of transportation kept you from medical appointments or from getting medications? no   In the past 12 months, has lack of transportation kept you from meetings, work, or from getting things needed for daily living? No   Patient Choice   Provider Choice list and CMS website (https://medicare.gov/care-compare#search) for post-acute Quality and Resource Measure Data were provided and reviewed with: Family     Spoke to patient's son, Alek, on the phone. He stated patient was at [facility] for about a week for therapy, but family does not want her to return to that SNF and requested new SNF list. New SNF list sent to Alek on his phone. Therapy evals pending at this time. CT team to follow up for choices.

## 2024-09-13 NOTE — PROGRESS NOTES
"Nutrition Initial Assessment:   Nutrition Assessment    Reason for Assessment: Admission nursing screening (MST score of 2 for wt loss of unsure amount and no decreased appetite.)    Patient is a 78 y.o. female presenting from [facility] for acute metabolic encephalopathy. Pt w/possible UTI. Started on antibiotics. Pt w/recent fall PTA.  Pt consumed 75% of breakfast today (9/13).     Past Medical History   has a past medical history of Dementia (Multi), Depression, Hyperlipidemia, Hypertension, Metabolic encephalopathy, Parkinson's disease (Multi), and Recurrent falls.  Surgical History   has a past surgical history that includes Adenoidectomy and Tonsillectomy.    Nutrition History:  Energy Intake: Fair 50-75 %  Food and Nutrient History: Pt reports being from home alone (but actually at rehab at the [facility]) and finds it difficult to cook for one person. Pt endorses having eggs, yogurt and coffee often for breakfast. Pt drinks 1-2 Boost/Ensure supplements per day. Pt eating >50% of meals per RN at facility.  Food Allergies/Intolerances:  None  GI Symptoms: None  Oral Problems: None     Anthropometrics:  Height: 170.2 cm (5' 7\")   Weight: 67.4 kg (148 lb 9.4 oz)   BMI (Calculated): 23.27         Weight History:   Wt Readings from Last 22 Encounters:   09/13/24 67.4 kg (148 lb 9.4 oz)   09/12/24 67.1 kg (148 lb)   09/11/24 68 kg (150 lb)   09/06/24 68 kg (150 lb)   09/05/24 68 kg (150 lb)   09/01/24 67.6 kg (149 lb 1.6 oz)   10/23/23 81.6 kg (180 lb)   08/15/22 85.9 kg (189 lb 6 oz)   07/27/21 97.3 kg (214 lb 7 oz)   04/27/21 96.6 kg (213 lb)   02/10/20 94.8 kg (209 lb 1.6 oz)       Weight Change %:  Weight History / % Weight Change: per archives 8/22/24 148#, 10/23 180#, 6/20/23 185#, 4/22 20# Wt loss of 20% x 15 months, 17.8% x 1 year. Pt endorses gradual wt loss and some intentional by eating healthier.  Significant Weight Loss: No    Nutrition Focused Physical Exam Findings:    Subcutaneous Fat Loss: " "  Orbital Fat Pads: Well nourished (slightly bulging fat pads)  Buccal Fat Pads: Mild-Moderate (flat cheeks, minimal bounce)  Triceps: Mild-Moderate (less than ample fat tissue)  Muscle Wasting:  Temporalis: Severe (hollowed scooping depression)  Pectoralis (Clavicular Region): Well nourished (clavicle not visible)  Quadriceps: Well nourished (well developed, well rounded)  Gastrocnemius: Well nourished (well developed bulbous muscle)  Edema:  Edema: none  Physical Findings:  Skin: Positive (L hand skin tear/laceration, ecchymosis, warm.)    Nutrition Significant Labs:  CBC Trend:   Results from last 7 days   Lab Units 09/13/24 0614 09/12/24 2025   WBC AUTO x10*3/uL 11.6* 16.1*   RBC AUTO x10*6/uL 3.77* 3.94*   HEMOGLOBIN g/dL 11.6* 12.2   HEMATOCRIT % 36.5 37.6   MCV fL 97 95   PLATELETS AUTO x10*3/uL 212 210    , BMP Trend:   Results from last 7 days   Lab Units 09/13/24  0613 09/12/24 1939   GLUCOSE mg/dL 86 130*   CALCIUM mg/dL 8.6 8.9   SODIUM mmol/L 139 135*   POTASSIUM mmol/L 3.3* 4.6   CO2 mmol/L 28 24   CHLORIDE mmol/L 101 102   BUN mg/dL 21 23   CREATININE mg/dL 0.91 1.08*    , A1C:  Lab Results   Component Value Date    HGBA1C 5.3 10/09/2023   , BG POCT trend:    , Liver Function Trend:   Results from last 7 days   Lab Units 09/12/24 1939   ALK PHOS U/L 78   AST U/L 13   ALT U/L <3*   BILIRUBIN TOTAL mg/dL 0.9    , Renal Lab Trend:   Results from last 7 days   Lab Units 09/13/24  0613 09/12/24 1939   POTASSIUM mmol/L 3.3* 4.6   PHOSPHORUS mg/dL 3.2  --    SODIUM mmol/L 139 135*   MAGNESIUM mg/dL 2.39  --    EGFR mL/min/1.73m*2 66 53*   BUN mg/dL 21 23   CREATININE mg/dL 0.91 1.08*    , Vit D: No results found for: \"VITD25\" , Vit B12: No results found for: \"WYMREYDR82\" , Iron Panel: No results found for: \"IRON\", \"TIBC\", \"FERRITIN\" , Folate: No results found for: \"FOLATE\"     Nutrition Specific Medications:  Scheduled medications  amantadine, 100 mg, oral, Daily  carbidopa-levodopa, 1 tablet, oral, " Nightly  carbidopa-levodopa, 2 tablet, oral, 4x daily  cefTRIAXone, 2 g, intravenous, Daily  melatonin, 3 mg, oral, Nightly  polyethylene glycol, 17 g, oral, Daily  QUEtiapine, 25 mg, oral, Nightly  [START ON 9/14/2024] venlafaxine XR, 150 mg, oral, Daily with breakfast  [START ON 9/14/2024] venlafaxine XR, 75 mg, oral, Daily with breakfast      Continuous medications  lactated Ringer's, 70 mL/hr, Last Rate: 70 mL/hr (09/13/24 0953)      PRN medications  PRN medications: acetaminophen  0-10 (Numeric) Pain Score: 0 - No pain     I/O:    ;      Dietary Orders (From admission, onward)       Start     Ordered    09/13/24 1303  Oral nutritional supplements  Until discontinued        Question Answer Comment   Deliver with Breakfast    Deliver with Dinner    Select supplement: Ensure Plus High Protein        09/13/24 1302    09/13/24 0046  Adult diet Cardiac; 70 gm fat; 2 - 3 grams Sodium  Diet effective now        Question Answer Comment   Diet type Cardiac    Fat restriction: 70 gm fat    Sodium restriction: 2 - 3 grams Sodium        09/13/24 0045                     Estimated Needs:   Total Energy Estimated Needs (kCal):  (2710-0507 (25-30kcal/kg))     Total Protein Estimated Needs (g):  (67-80 (1.0-1.3g/kg))     Total Fluid Estimated Needs (mL):  (1mL/kcal)           Nutrition Diagnosis   Malnutrition Diagnosis  Patient has Malnutrition Diagnosis: No    Nutrition Diagnosis  Patient has Nutrition Diagnosis: Yes  Diagnosis Status (1): New  Nutrition Diagnosis 1: Unintended weight loss  Related to (1): unknown etiology  As Evidenced by (1): 17.8% wt loss x 1 year.       Nutrition Interventions/Recommendations         Nutrition Prescription:  Individualized Nutrition Prescription Provided for : Diet: continue w/oral diet as ordered - Adult diet Cardiac; 70 gm fat; 2 - 3 grams Sodium        Nutrition Interventions:   Interventions: Meals and snacks, Medical food supplement  Meals and Snacks: Fat-modified diet,  Mineral-modified diet  Goal: will consume >50% of meals.  Medical Food Supplement: Commercial beverage  Goal: will provide Ensure Plus HP (350kcal, 20g pro per 8oz) at breakfast and dinner meals.         Nutrition Education:   Encouraged PO intakes and ONS.       Nutrition Monitoring and Evaluation   Food/Nutrient Related History Monitoring  Monitoring and Evaluation Plan: Energy intake  Energy Intake: Estimated energy intake  Criteria: will meet >75% of estimated energy needs w/meals and ONS    Body Composition/Growth/Weight History  Monitoring and Evaluation Plan: Weight  Weight: Measured weight  Criteria: maintain stable wt    Biochemical Data, Medical Tests and Procedures  Monitoring and Evaluation Plan: Electrolyte/renal panel, Glucose/endocrine profile  Electrolyte and Renal Panel: Sodium, Potassium, Phosphorus, Magnesium, Creatinine  Criteria: WNR  Glucose/Endocrine Profile: Glucose, casual  Criteria: BG 70-180mg/dL    Nutrition Focused Physical Findings  Monitoring and Evaluation Plan: Skin  Skin: Other (Comment)  Criteria: promote skin healing and integrity         Time Spent (min): 60 minutes  Follow up 5-7 days  Last RD note 9/13/24

## 2024-09-14 LAB
ANION GAP SERPL CALC-SCNC: 11 MMOL/L (ref 10–20)
APPEARANCE UR: CLEAR
BILIRUB UR STRIP.AUTO-MCNC: NEGATIVE MG/DL
BUN SERPL-MCNC: 19 MG/DL (ref 6–23)
CALCIUM SERPL-MCNC: 8.6 MG/DL (ref 8.6–10.3)
CHLORIDE SERPL-SCNC: 106 MMOL/L (ref 98–107)
CO2 SERPL-SCNC: 28 MMOL/L (ref 21–32)
COLOR UR: ABNORMAL
CREAT SERPL-MCNC: 0.78 MG/DL (ref 0.5–1.05)
EGFRCR SERPLBLD CKD-EPI 2021: 79 ML/MIN/1.73M*2
ERYTHROCYTE [DISTWIDTH] IN BLOOD BY AUTOMATED COUNT: 12.1 % (ref 11.5–14.5)
GLUCOSE SERPL-MCNC: 75 MG/DL (ref 74–99)
GLUCOSE UR STRIP.AUTO-MCNC: NORMAL MG/DL
HCT VFR BLD AUTO: 36.2 % (ref 36–46)
HGB BLD-MCNC: 11.5 G/DL (ref 12–16)
KETONES UR STRIP.AUTO-MCNC: ABNORMAL MG/DL
LEUKOCYTE ESTERASE UR QL STRIP.AUTO: NEGATIVE
MAGNESIUM SERPL-MCNC: 2.27 MG/DL (ref 1.6–2.4)
MCH RBC QN AUTO: 30.9 PG (ref 26–34)
MCHC RBC AUTO-ENTMCNC: 31.8 G/DL (ref 32–36)
MCV RBC AUTO: 97 FL (ref 80–100)
NITRITE UR QL STRIP.AUTO: NEGATIVE
NRBC BLD-RTO: 0 /100 WBCS (ref 0–0)
PH UR STRIP.AUTO: 6.5 [PH]
PLATELET # BLD AUTO: 191 X10*3/UL (ref 150–450)
POTASSIUM SERPL-SCNC: 3.6 MMOL/L (ref 3.5–5.3)
PROT UR STRIP.AUTO-MCNC: NEGATIVE MG/DL
RBC # BLD AUTO: 3.72 X10*6/UL (ref 4–5.2)
RBC # UR STRIP.AUTO: NEGATIVE /UL
SODIUM SERPL-SCNC: 141 MMOL/L (ref 136–145)
SP GR UR STRIP.AUTO: 1.01
UROBILINOGEN UR STRIP.AUTO-MCNC: NORMAL MG/DL
WBC # BLD AUTO: 7.1 X10*3/UL (ref 4.4–11.3)

## 2024-09-14 PROCEDURE — 80048 BASIC METABOLIC PNL TOTAL CA: CPT

## 2024-09-14 PROCEDURE — 81003 URINALYSIS AUTO W/O SCOPE: CPT

## 2024-09-14 PROCEDURE — 83735 ASSAY OF MAGNESIUM: CPT

## 2024-09-14 PROCEDURE — 36415 COLL VENOUS BLD VENIPUNCTURE: CPT

## 2024-09-14 PROCEDURE — 2500000002 HC RX 250 W HCPCS SELF ADMINISTERED DRUGS (ALT 637 FOR MEDICARE OP, ALT 636 FOR OP/ED): Performed by: NURSE PRACTITIONER

## 2024-09-14 PROCEDURE — 1200000002 HC GENERAL ROOM WITH TELEMETRY DAILY

## 2024-09-14 PROCEDURE — 2500000004 HC RX 250 GENERAL PHARMACY W/ HCPCS (ALT 636 FOR OP/ED): Performed by: NURSE PRACTITIONER

## 2024-09-14 PROCEDURE — 2500000001 HC RX 250 WO HCPCS SELF ADMINISTERED DRUGS (ALT 637 FOR MEDICARE OP): Performed by: NURSE PRACTITIONER

## 2024-09-14 PROCEDURE — 85027 COMPLETE CBC AUTOMATED: CPT

## 2024-09-14 RX ORDER — POTASSIUM CHLORIDE 20 MEQ/1
40 TABLET, EXTENDED RELEASE ORAL ONCE
Status: COMPLETED | OUTPATIENT
Start: 2024-09-14 | End: 2024-09-14

## 2024-09-14 ASSESSMENT — COGNITIVE AND FUNCTIONAL STATUS - GENERAL
DRESSING REGULAR UPPER BODY CLOTHING: A LOT
DAILY ACTIVITIY SCORE: 14
MOBILITY SCORE: 16
WALKING IN HOSPITAL ROOM: A LITTLE
CLIMB 3 TO 5 STEPS WITH RAILING: A LOT
STANDING UP FROM CHAIR USING ARMS: A LOT
TURNING FROM BACK TO SIDE WHILE IN FLAT BAD: A LITTLE
WALKING IN HOSPITAL ROOM: A LOT
TOILETING: A LOT
DAILY ACTIVITIY SCORE: 14
TOILETING: A LOT
EATING MEALS: A LITTLE
DRESSING REGULAR LOWER BODY CLOTHING: A LOT
MOVING TO AND FROM BED TO CHAIR: A LOT
STANDING UP FROM CHAIR USING ARMS: A LITTLE
HELP NEEDED FOR BATHING: A LOT
MOVING TO AND FROM BED TO CHAIR: A LOT
DRESSING REGULAR LOWER BODY CLOTHING: A LOT
TURNING FROM BACK TO SIDE WHILE IN FLAT BAD: A LITTLE
MOVING FROM LYING ON BACK TO SITTING ON SIDE OF FLAT BED WITH BEDRAILS: A LITTLE
DRESSING REGULAR UPPER BODY CLOTHING: A LOT
EATING MEALS: A LITTLE
MOBILITY SCORE: 14
PERSONAL GROOMING: A LITTLE
CLIMB 3 TO 5 STEPS WITH RAILING: A LOT
PERSONAL GROOMING: A LITTLE
MOVING FROM LYING ON BACK TO SITTING ON SIDE OF FLAT BED WITH BEDRAILS: A LITTLE
HELP NEEDED FOR BATHING: A LOT

## 2024-09-14 ASSESSMENT — PAIN SCALES - GENERAL
PAINLEVEL_OUTOF10: 0 - NO PAIN

## 2024-09-14 ASSESSMENT — PAIN SCALES - WONG BAKER: WONGBAKER_NUMERICALRESPONSE: NO HURT

## 2024-09-14 ASSESSMENT — PAIN - FUNCTIONAL ASSESSMENT
PAIN_FUNCTIONAL_ASSESSMENT: 0-10
PAIN_FUNCTIONAL_ASSESSMENT: 0-10

## 2024-09-14 NOTE — NURSING NOTE
Sepsis alert.  Patient has known infection: UTI and is on antibiotics.  Patient was admitted with confusion and is still confused/forgetful at times.  No change in assessment. IVF infusing.  JAMAICA Ashton, medical house, notified.  No new orders at this time.

## 2024-09-14 NOTE — DISCHARGE SUMMARY
Discharge Diagnosis  Acute metabolic encephalopathy    Issues Requiring Follow-Up  Discharge to skilled nursing facility    Discharge Meds     Medication List      CHANGE how you take these medications     QUEtiapine 25 mg tablet; Commonly known as: SEROquel; Take 1 tablet (25   mg) by mouth once daily at bedtime.; What changed: how much to take     CONTINUE taking these medications     * carbidopa-levodopa  mg ER tablet; Commonly known as: Sinemet CR;   Take 1 tablet by mouth once daily at bedtime.   * carbidopa-levodopa  mg tablet; Commonly known as: Sinemet; Take   2 tablets by mouth 4 times a day. At 8a, 11a, 2p & 5p   cyanocobalamin (vitamin B-12) 1,000 mcg tablet extended release;   Commonly known as: Vitamin B-12   * Effexor  mg 24 hr capsule; Generic drug: venlafaxine XR   * venlafaxine XR 75 mg 24 hr capsule; Commonly known as: Effexor-XR   melatonin 3 mg capsule   polyethylene glycol 17 gram packet; Commonly known as: Glycolax, Miralax  * This list has 4 medication(s) that are the same as other medications   prescribed for you. Read the directions carefully, and ask your doctor or   other care provider to review them with you.     STOP taking these medications     cephalexin 250 mg capsule; Commonly known as: Keflex     ASK your doctor about these medications     amantadine 100 mg tablet; Commonly known as: Symmetrel; Take 1 tablet   (100 mg) by mouth 2 times a day. Twice a day at 8a and noon       Test Results Pending At Discharge  Pending Labs       Order Current Status    Extra Urine Gray Tube Collected (09/14/24 0510)    Urinalysis with Reflex Culture and Microscopic In process    Blood Culture Preliminary result    Blood Culture Preliminary result            Hospital Course   Patient was admitted to the hospital with a change in mental status patient was diagnosed with questionable urinary tract infection and EULALIA and acute metabolic encephalopathy patient given IV fluid with  improvement of her symptoms urine culture was negative antibiotic was stopped family interested in placing patient at the nursing home related to her poor functional status and dementia related to Parkinson disease    Pertinent Physical Exam At Time of Discharge  Physical Exam  HENT:      Right Ear: External ear normal.      Left Ear: External ear normal.      Mouth/Throat:      Mouth: Mucous membranes are moist.   Cardiovascular:      Rate and Rhythm: Normal rate and regular rhythm.      Heart sounds: No murmur heard.     No friction rub. No gallop.   Pulmonary:      Effort: No accessory muscle usage or respiratory distress.      Breath sounds: No stridor. No wheezing or rhonchi.   Chest:      Chest wall: No tenderness.   Abdominal:      General: There is no distension.      Palpations: There is no mass.      Tenderness: There is no abdominal tenderness. There is no guarding or rebound.   Musculoskeletal:         General: No deformity or signs of injury.      Cervical back: No rigidity or tenderness. Normal range of motion.      Right lower leg: No edema.      Left lower leg: No edema.   Skin:     Coloration: Skin is not jaundiced or pale.      Findings: No lesion.   Neurological:      Mental Status: She is alert and easily aroused.      Cranial Nerves: No cranial nerve deficit.      Sensory: No sensory deficit.      Motor: No weakness.     9/15/2024 condition has been stable overnight no change waiting for insurance approval for transfer to skilled nursing facility nursing staff was interviewed patient examined blood work was reviewed    Outpatient Follow-Up  Future Appointments   Date Time Provider Department Center   10/18/2024 11:30 AM G[facility] [id number] Saul Hayes MD

## 2024-09-14 NOTE — CARE PLAN
The patient's goals for the shift include see plan of care    The clinical goals for the shift include see poc      Pt. Is A/Ox 2 meds given per order and tolerated well by pt.    Pt is up with 2 assist, parkinson makes her slightly unsteady on her feet.     1800 pt has been seeing scary [employer] people, and is anxious and restless. Gave melatonin and Seroquel early  per doctors  order

## 2024-09-14 NOTE — PROGRESS NOTES
09/14/24 1348   Discharge Planning   Type of Post Acute Facility Services Skilled nursing   Expected Discharge Disposition SNF   Does the patient need discharge transport arranged? Yes   RoundTrip coordination needed? Yes   Has discharge transport been arranged? No     Referrals sent to 1. [facility] and [facility] per family's preference.

## 2024-09-14 NOTE — CARE PLAN
The patient's goals for the shift include see plan of care    The clinical goals for the shift include see plan of care

## 2024-09-14 NOTE — CARE PLAN
The patient's goals for the shift include see plan of care    The clinical goals for the shift include see poc

## 2024-09-14 NOTE — CARE PLAN
The patient's goals for the shift include being discharged ASAP    The clinical goals for the shift include remaining hemodynamically stable throughout this shift.

## 2024-09-15 ENCOUNTER — APPOINTMENT (OUTPATIENT)
Dept: CARDIOLOGY | Facility: HOSPITAL | Age: 79
DRG: 689 | End: 2024-09-15
Payer: MEDICARE

## 2024-09-15 VITALS
DIASTOLIC BLOOD PRESSURE: 71 MMHG | OXYGEN SATURATION: 96 % | HEART RATE: 69 BPM | HEIGHT: 67 IN | TEMPERATURE: 98.2 F | WEIGHT: 148.59 LBS | RESPIRATION RATE: 18 BRPM | SYSTOLIC BLOOD PRESSURE: 127 MMHG | BODY MASS INDEX: 23.32 KG/M2

## 2024-09-15 LAB
ANION GAP SERPL CALC-SCNC: 13 MMOL/L (ref 10–20)
ANION GAP SERPL CALC-SCNC: 14 MMOL/L (ref 10–20)
BACTERIA BLD CULT: NORMAL
BACTERIA BLD CULT: NORMAL
BUN SERPL-MCNC: 23 MG/DL (ref 6–23)
BUN SERPL-MCNC: 30 MG/DL (ref 6–23)
CALCIUM SERPL-MCNC: 8.5 MG/DL (ref 8.6–10.3)
CALCIUM SERPL-MCNC: 9.2 MG/DL (ref 8.6–10.3)
CHLORIDE SERPL-SCNC: 104 MMOL/L (ref 98–107)
CHLORIDE SERPL-SCNC: 105 MMOL/L (ref 98–107)
CO2 SERPL-SCNC: 25 MMOL/L (ref 21–32)
CO2 SERPL-SCNC: 26 MMOL/L (ref 21–32)
CREAT SERPL-MCNC: 0.66 MG/DL (ref 0.5–1.05)
CREAT SERPL-MCNC: 0.85 MG/DL (ref 0.5–1.05)
EGFRCR SERPLBLD CKD-EPI 2021: 71 ML/MIN/1.73M*2
EGFRCR SERPLBLD CKD-EPI 2021: >90 ML/MIN/1.73M*2
ERYTHROCYTE [DISTWIDTH] IN BLOOD BY AUTOMATED COUNT: 12.2 % (ref 11.5–14.5)
GLUCOSE SERPL-MCNC: 112 MG/DL (ref 74–99)
GLUCOSE SERPL-MCNC: 80 MG/DL (ref 74–99)
HCT VFR BLD AUTO: 32.7 % (ref 36–46)
HGB BLD-MCNC: 10.6 G/DL (ref 12–16)
HOLD SPECIMEN: NORMAL
MAGNESIUM SERPL-MCNC: 2.1 MG/DL (ref 1.6–2.4)
MCH RBC QN AUTO: 30.9 PG (ref 26–34)
MCHC RBC AUTO-ENTMCNC: 32.4 G/DL (ref 32–36)
MCV RBC AUTO: 95 FL (ref 80–100)
NRBC BLD-RTO: 0 /100 WBCS (ref 0–0)
PLATELET # BLD AUTO: 208 X10*3/UL (ref 150–450)
POTASSIUM SERPL-SCNC: 3.9 MMOL/L (ref 3.5–5.3)
POTASSIUM SERPL-SCNC: 4.5 MMOL/L (ref 3.5–5.3)
RBC # BLD AUTO: 3.43 X10*6/UL (ref 4–5.2)
SODIUM SERPL-SCNC: 139 MMOL/L (ref 136–145)
SODIUM SERPL-SCNC: 139 MMOL/L (ref 136–145)
WBC # BLD AUTO: 6.2 X10*3/UL (ref 4.4–11.3)

## 2024-09-15 PROCEDURE — 2500000002 HC RX 250 W HCPCS SELF ADMINISTERED DRUGS (ALT 637 FOR MEDICARE OP, ALT 636 FOR OP/ED): Performed by: INTERNAL MEDICINE

## 2024-09-15 PROCEDURE — 80048 BASIC METABOLIC PNL TOTAL CA: CPT | Performed by: INTERNAL MEDICINE

## 2024-09-15 PROCEDURE — 2500000001 HC RX 250 WO HCPCS SELF ADMINISTERED DRUGS (ALT 637 FOR MEDICARE OP): Performed by: NURSE PRACTITIONER

## 2024-09-15 PROCEDURE — 36415 COLL VENOUS BLD VENIPUNCTURE: CPT

## 2024-09-15 PROCEDURE — 85027 COMPLETE CBC AUTOMATED: CPT

## 2024-09-15 PROCEDURE — 36415 COLL VENOUS BLD VENIPUNCTURE: CPT | Performed by: INTERNAL MEDICINE

## 2024-09-15 PROCEDURE — 1200000002 HC GENERAL ROOM WITH TELEMETRY DAILY

## 2024-09-15 PROCEDURE — 80048 BASIC METABOLIC PNL TOTAL CA: CPT

## 2024-09-15 PROCEDURE — 93005 ELECTROCARDIOGRAM TRACING: CPT

## 2024-09-15 PROCEDURE — 2500000002 HC RX 250 W HCPCS SELF ADMINISTERED DRUGS (ALT 637 FOR MEDICARE OP, ALT 636 FOR OP/ED): Performed by: NURSE PRACTITIONER

## 2024-09-15 PROCEDURE — 2500000004 HC RX 250 GENERAL PHARMACY W/ HCPCS (ALT 636 FOR OP/ED)

## 2024-09-15 PROCEDURE — 83735 ASSAY OF MAGNESIUM: CPT

## 2024-09-15 RX ORDER — QUETIAPINE FUMARATE 25 MG/1
25 TABLET, FILM COATED ORAL DAILY PRN
Status: DISCONTINUED | OUTPATIENT
Start: 2024-09-15 | End: 2024-09-17 | Stop reason: HOSPADM

## 2024-09-15 RX ORDER — METOPROLOL TARTRATE 1 MG/ML
5 INJECTION, SOLUTION INTRAVENOUS ONCE
Status: COMPLETED | OUTPATIENT
Start: 2024-09-15 | End: 2024-09-16

## 2024-09-15 ASSESSMENT — COGNITIVE AND FUNCTIONAL STATUS - GENERAL
EATING MEALS: A LITTLE
MOVING TO AND FROM BED TO CHAIR: A LOT
MOBILITY SCORE: 16
HELP NEEDED FOR BATHING: A LOT
STANDING UP FROM CHAIR USING ARMS: A LITTLE
HELP NEEDED FOR BATHING: A LOT
CLIMB 3 TO 5 STEPS WITH RAILING: A LOT
WALKING IN HOSPITAL ROOM: A LITTLE
MOVING FROM LYING ON BACK TO SITTING ON SIDE OF FLAT BED WITH BEDRAILS: A LITTLE
DRESSING REGULAR UPPER BODY CLOTHING: A LOT
DAILY ACTIVITIY SCORE: 14
MOVING TO AND FROM BED TO CHAIR: A LOT
STANDING UP FROM CHAIR USING ARMS: A LITTLE
CLIMB 3 TO 5 STEPS WITH RAILING: A LOT
DRESSING REGULAR UPPER BODY CLOTHING: A LOT
TURNING FROM BACK TO SIDE WHILE IN FLAT BAD: A LITTLE
MOBILITY SCORE: 16
DRESSING REGULAR LOWER BODY CLOTHING: A LOT
TOILETING: A LOT
TOILETING: A LOT
TURNING FROM BACK TO SIDE WHILE IN FLAT BAD: A LITTLE
PERSONAL GROOMING: A LITTLE
DRESSING REGULAR LOWER BODY CLOTHING: A LOT
MOVING FROM LYING ON BACK TO SITTING ON SIDE OF FLAT BED WITH BEDRAILS: A LITTLE
PERSONAL GROOMING: A LITTLE
DAILY ACTIVITIY SCORE: 14
EATING MEALS: A LITTLE
WALKING IN HOSPITAL ROOM: A LITTLE

## 2024-09-15 ASSESSMENT — PAIN - FUNCTIONAL ASSESSMENT
PAIN_FUNCTIONAL_ASSESSMENT: 0-10
PAIN_FUNCTIONAL_ASSESSMENT: 0-10

## 2024-09-15 ASSESSMENT — PAIN SCALES - GENERAL
PAINLEVEL_OUTOF10: 4
PAINLEVEL_OUTOF10: 0 - NO PAIN
PAINLEVEL_OUTOF10: 0 - NO PAIN

## 2024-09-15 ASSESSMENT — PAIN SCALES - WONG BAKER: WONGBAKER_NUMERICALRESPONSE: NO HURT

## 2024-09-15 NOTE — CARE PLAN
"The patient's goals for the shift include see plan of care    The clinical goals for the shift include pt will demonstrate comfort aeb sleeping in intervals of 3 hours or greater by end of this shift   A/O x 3.. pt waxes and wanes. Take pills whole. NSR on tele. Up with 2 assist to BSC and a walker, but unsteady due to parkinson's.    Continues to have hallucinations, can become restless, impulsive, super anxious and agitated. Cont x .2 Gave PRN Seroquel     VSS    DC plans \"Inquiry sent on the acceptance status with [facility]. Waiting for response . Medical team updated. She will need a pre cert once accepted.      1500: Unable to confirm if [facility] can accept. TCC to follow up with on Monday\" per care transition team.  "

## 2024-09-15 NOTE — CARE PLAN
The patient's goals for the shift include see plan of care    The clinical goals for the shift include pt will demonstrate comfort aeb sleeping in intervals of 3 hours or greater by end of this shift    Over the shift, the patient did make progress toward the following goals. Pt has been up to [facility] x3 continent of bowel and bladder, with assist of 2 person assist gait belt and walker, pt able to reposition self in bed ,pt has slept in long intervals , pt unable to use ncl , bed alarm on pt has remained safe this shift

## 2024-09-16 ENCOUNTER — APPOINTMENT (OUTPATIENT)
Dept: CARDIOLOGY | Facility: HOSPITAL | Age: 79
DRG: 689 | End: 2024-09-16
Payer: MEDICARE

## 2024-09-16 PROBLEM — I48.92 ATRIAL FIBRILLATION AND FLUTTER: Status: ACTIVE | Noted: 2024-09-16

## 2024-09-16 PROBLEM — I48.91 ATRIAL FIBRILLATION AND FLUTTER: Status: ACTIVE | Noted: 2024-09-16

## 2024-09-16 LAB
ANION GAP SERPL CALC-SCNC: 11 MMOL/L (ref 10–20)
AORTIC VALVE PEAK VELOCITY: 1.46 M/S
ATRIAL RATE: 136 BPM
ATRIAL RATE: 60 BPM
ATRIAL RATE: 66 BPM
AV PEAK GRADIENT: 8.5 MMHG
AVA (PEAK VEL): 3.32 CM2
BUN SERPL-MCNC: 22 MG/DL (ref 6–23)
CALCIUM SERPL-MCNC: 8.5 MG/DL (ref 8.6–10.3)
CHLORIDE SERPL-SCNC: 106 MMOL/L (ref 98–107)
CO2 SERPL-SCNC: 27 MMOL/L (ref 21–32)
CREAT SERPL-MCNC: 0.66 MG/DL (ref 0.5–1.05)
EGFRCR SERPLBLD CKD-EPI 2021: >90 ML/MIN/1.73M*2
EJECTION FRACTION APICAL 4 CHAMBER: 57.6
EJECTION FRACTION: 73 %
ERYTHROCYTE [DISTWIDTH] IN BLOOD BY AUTOMATED COUNT: 12.2 % (ref 11.5–14.5)
GLUCOSE SERPL-MCNC: 104 MG/DL (ref 74–99)
HCT VFR BLD AUTO: 35.4 % (ref 36–46)
HGB BLD-MCNC: 11.1 G/DL (ref 12–16)
LEFT VENTRICLE INTERNAL DIMENSION DIASTOLE: 4.5 CM (ref 3.5–6)
LEFT VENTRICULAR OUTFLOW TRACT DIAMETER: 2.1 CM
LV EJECTION FRACTION BIPLANE: 59 %
MAGNESIUM SERPL-MCNC: 2.11 MG/DL (ref 1.6–2.4)
MCH RBC QN AUTO: 30.5 PG (ref 26–34)
MCHC RBC AUTO-ENTMCNC: 31.4 G/DL (ref 32–36)
MCV RBC AUTO: 97 FL (ref 80–100)
MITRAL VALVE E/A RATIO: 0.82
NRBC BLD-RTO: 0 /100 WBCS (ref 0–0)
P AXIS: 100 DEGREES
P AXIS: 104 DEGREES
P AXIS: 88 DEGREES
P OFFSET: 112 MS
P OFFSET: 200 MS
P OFFSET: 201 MS
P ONSET: 146 MS
P ONSET: 147 MS
P ONSET: 88 MS
PLATELET # BLD AUTO: 242 X10*3/UL (ref 150–450)
POTASSIUM SERPL-SCNC: 4 MMOL/L (ref 3.5–5.3)
PR INTERVAL: 136 MS
PR INTERVAL: 146 MS
PR INTERVAL: 150 MS
Q ONSET: 209 MS
Q ONSET: 220 MS
Q ONSET: 221 MS
QRS COUNT: 10 BEATS
QRS COUNT: 11 BEATS
QRS COUNT: 23 BEATS
QRS DURATION: 88 MS
QRS DURATION: 92 MS
QRS DURATION: 96 MS
QT INTERVAL: 268 MS
QT INTERVAL: 402 MS
QT INTERVAL: 404 MS
QTC CALCULATION(BAZETT): 403 MS
QTC CALCULATION(BAZETT): 404 MS
QTC CALCULATION(BAZETT): 421 MS
QTC FREDERICIA: 351 MS
QTC FREDERICIA: 404 MS
QTC FREDERICIA: 415 MS
R AXIS: 15 DEGREES
R AXIS: 24 DEGREES
R AXIS: 42 DEGREES
RBC # BLD AUTO: 3.64 X10*6/UL (ref 4–5.2)
RIGHT VENTRICLE FREE WALL PEAK S': 19 CM/S
RIGHT VENTRICLE PEAK SYSTOLIC PRESSURE: 37.3 MMHG
SODIUM SERPL-SCNC: 140 MMOL/L (ref 136–145)
T AXIS: 225 DEGREES
T AXIS: 41 DEGREES
T AXIS: 47 DEGREES
T OFFSET: 343 MS
T OFFSET: 421 MS
T OFFSET: 423 MS
TRICUSPID ANNULAR PLANE SYSTOLIC EXCURSION: 1.9 CM
VENTRICULAR RATE: 136 BPM
VENTRICULAR RATE: 60 BPM
VENTRICULAR RATE: 66 BPM
WBC # BLD AUTO: 6.7 X10*3/UL (ref 4.4–11.3)

## 2024-09-16 PROCEDURE — 97535 SELF CARE MNGMENT TRAINING: CPT | Mod: GO,CO

## 2024-09-16 PROCEDURE — 97530 THERAPEUTIC ACTIVITIES: CPT | Mod: GP,CQ

## 2024-09-16 PROCEDURE — 97116 GAIT TRAINING THERAPY: CPT | Mod: GP,CQ

## 2024-09-16 PROCEDURE — 93005 ELECTROCARDIOGRAM TRACING: CPT

## 2024-09-16 PROCEDURE — 2500000001 HC RX 250 WO HCPCS SELF ADMINISTERED DRUGS (ALT 637 FOR MEDICARE OP): Performed by: NURSE PRACTITIONER

## 2024-09-16 PROCEDURE — 2060000001 HC INTERMEDIATE ICU ROOM DAILY

## 2024-09-16 PROCEDURE — 2500000004 HC RX 250 GENERAL PHARMACY W/ HCPCS (ALT 636 FOR OP/ED): Performed by: NURSE PRACTITIONER

## 2024-09-16 PROCEDURE — 99222 1ST HOSP IP/OBS MODERATE 55: CPT

## 2024-09-16 PROCEDURE — 2500000002 HC RX 250 W HCPCS SELF ADMINISTERED DRUGS (ALT 637 FOR MEDICARE OP, ALT 636 FOR OP/ED): Performed by: NURSE PRACTITIONER

## 2024-09-16 PROCEDURE — 36415 COLL VENOUS BLD VENIPUNCTURE: CPT

## 2024-09-16 PROCEDURE — 2500000004 HC RX 250 GENERAL PHARMACY W/ HCPCS (ALT 636 FOR OP/ED)

## 2024-09-16 PROCEDURE — 93306 TTE W/DOPPLER COMPLETE: CPT | Performed by: INTERNAL MEDICINE

## 2024-09-16 PROCEDURE — 2500000005 HC RX 250 GENERAL PHARMACY W/O HCPCS

## 2024-09-16 PROCEDURE — 83735 ASSAY OF MAGNESIUM: CPT

## 2024-09-16 PROCEDURE — 80048 BASIC METABOLIC PNL TOTAL CA: CPT

## 2024-09-16 PROCEDURE — 85027 COMPLETE CBC AUTOMATED: CPT

## 2024-09-16 PROCEDURE — 2500000001 HC RX 250 WO HCPCS SELF ADMINISTERED DRUGS (ALT 637 FOR MEDICARE OP): Performed by: INTERNAL MEDICINE

## 2024-09-16 PROCEDURE — 2500000002 HC RX 250 W HCPCS SELF ADMINISTERED DRUGS (ALT 637 FOR MEDICARE OP, ALT 636 FOR OP/ED): Performed by: INTERNAL MEDICINE

## 2024-09-16 PROCEDURE — 93306 TTE W/DOPPLER COMPLETE: CPT

## 2024-09-16 RX ORDER — QUETIAPINE FUMARATE 25 MG/1
25 TABLET, FILM COATED ORAL NIGHTLY
Start: 2024-09-16

## 2024-09-16 RX ORDER — CARBIDOPA AND LEVODOPA 50; 200 MG/1; MG/1
1 TABLET, EXTENDED RELEASE ORAL NIGHTLY
Start: 2024-09-16

## 2024-09-16 RX ORDER — CARBIDOPA AND LEVODOPA 25; 100 MG/1; MG/1
2 TABLET ORAL 4 TIMES DAILY
Start: 2024-09-16

## 2024-09-16 RX ORDER — QUETIAPINE FUMARATE 25 MG/1
25 TABLET, FILM COATED ORAL DAILY PRN
Start: 2024-09-16

## 2024-09-16 RX ORDER — ENOXAPARIN SODIUM 100 MG/ML
1.5 INJECTION SUBCUTANEOUS EVERY 24 HOURS
Status: DISCONTINUED | OUTPATIENT
Start: 2024-09-16 | End: 2024-09-16

## 2024-09-16 RX ORDER — DILTIAZEM HCL/D5W 125 MG/125
5-15 PLASTIC BAG, INJECTION (ML) INTRAVENOUS CONTINUOUS
Status: DISCONTINUED | OUTPATIENT
Start: 2024-09-16 | End: 2024-09-16

## 2024-09-16 RX ORDER — METOPROLOL SUCCINATE 25 MG/1
25 TABLET, EXTENDED RELEASE ORAL DAILY
Start: 2024-09-16

## 2024-09-16 RX ORDER — METOPROLOL SUCCINATE 25 MG/1
25 TABLET, EXTENDED RELEASE ORAL DAILY
Status: DISCONTINUED | OUTPATIENT
Start: 2024-09-16 | End: 2024-09-17 | Stop reason: HOSPADM

## 2024-09-16 RX ORDER — VENLAFAXINE HYDROCHLORIDE 75 MG/1
75 CAPSULE, EXTENDED RELEASE ORAL DAILY
Start: 2024-09-16

## 2024-09-16 RX ORDER — METOPROLOL TARTRATE 1 MG/ML
5 INJECTION, SOLUTION INTRAVENOUS ONCE
Status: DISCONTINUED | OUTPATIENT
Start: 2024-09-16 | End: 2024-09-16

## 2024-09-16 RX ORDER — ACETAMINOPHEN, DEXTROMETHORPHAN HBR, DOXYLAMINE SUCCINATE, PHENYLEPHRINE HCL 650; 20; 12.5; 1 MG/30ML; MG/30ML; MG/30ML; MG/30ML
1 SOLUTION ORAL DAILY
Start: 2024-09-16

## 2024-09-16 RX ORDER — POLYETHYLENE GLYCOL 3350 17 G/17G
17 POWDER, FOR SOLUTION ORAL DAILY
Start: 2024-09-16

## 2024-09-16 RX ORDER — TALC
3 POWDER (GRAM) TOPICAL NIGHTLY
Start: 2024-09-16

## 2024-09-16 RX ORDER — VENLAFAXINE HYDROCHLORIDE 150 MG/1
150 CAPSULE, EXTENDED RELEASE ORAL
Start: 2024-09-16

## 2024-09-16 ASSESSMENT — COGNITIVE AND FUNCTIONAL STATUS - GENERAL
STANDING UP FROM CHAIR USING ARMS: A LOT
CLIMB 3 TO 5 STEPS WITH RAILING: A LOT
HELP NEEDED FOR BATHING: A LOT
DAILY ACTIVITIY SCORE: 24
MOBILITY SCORE: 20
EATING MEALS: A LITTLE
HELP NEEDED FOR BATHING: A LITTLE
PERSONAL GROOMING: A LITTLE
MOVING TO AND FROM BED TO CHAIR: A LITTLE
DRESSING REGULAR UPPER BODY CLOTHING: A LITTLE
TOILETING: A LITTLE
MOBILITY SCORE: 23
MOBILITY SCORE: 16
WALKING IN HOSPITAL ROOM: A LITTLE
TOILETING: A LITTLE
CLIMB 3 TO 5 STEPS WITH RAILING: A LITTLE
DRESSING REGULAR LOWER BODY CLOTHING: A LITTLE
MOVING TO AND FROM BED TO CHAIR: A LITTLE
DRESSING REGULAR LOWER BODY CLOTHING: A LOT
DAILY ACTIVITIY SCORE: 21
WALKING IN HOSPITAL ROOM: A LITTLE
DAILY ACTIVITIY SCORE: 16
CLIMB 3 TO 5 STEPS WITH RAILING: A LOT
MOVING FROM LYING ON BACK TO SITTING ON SIDE OF FLAT BED WITH BEDRAILS: A LITTLE
TURNING FROM BACK TO SIDE WHILE IN FLAT BAD: A LITTLE

## 2024-09-16 ASSESSMENT — PAIN - FUNCTIONAL ASSESSMENT
PAIN_FUNCTIONAL_ASSESSMENT: 0-10

## 2024-09-16 ASSESSMENT — PAIN SCALES - GENERAL
PAINLEVEL_OUTOF10: 0 - NO PAIN

## 2024-09-16 ASSESSMENT — ACTIVITIES OF DAILY LIVING (ADL): HOME_MANAGEMENT_TIME_ENTRY: 27

## 2024-09-16 NOTE — PROGRESS NOTES
"Subjective  Patient had uneventful night does not complain about any chest pain shortness of breath patient went into atrial fibrillation overnight and started on Cardizem drip with conversion  Nursing staff was interviewed  Objectives    Last Recorded Vitals  Blood pressure 105/66, pulse 62, temperature 36.5 °C (97.7 °F), temperature source Temporal, resp. rate 15, height 1.702 m (5' 7\"), weight 68.2 kg (150 lb 4.8 oz), SpO2 98%.    Physical Exam  HENT:      Right Ear: External ear normal.      Left Ear: External ear normal.      Mouth/Throat:      Mouth: Mucous membranes are moist.   Cardiovascular:      Rate and Rhythm: Normal rate and regular rhythm.      Heart sounds: No murmur heard.     No friction rub. No gallop.   Pulmonary:      Effort: No accessory muscle usage or respiratory distress.      Breath sounds: No stridor. No wheezing or rhonchi.   Chest:      Chest wall: No tenderness.   Abdominal:      General: There is no distension.      Palpations: There is no mass.      Tenderness: There is no abdominal tenderness. There is no guarding or rebound.   Musculoskeletal:         General: No deformity or signs of injury.      Cervical back: No rigidity or tenderness. Normal range of motion.      Right lower leg: No edema.      Left lower leg: No edema.   Skin:     Coloration: Skin is not jaundiced or pale.      Findings: No lesion.   Neurological:   Alert confused       Labs    Admission on 09/12/2024   Component Date Value Ref Range Status    WBC 09/12/2024 16.1 (H)  4.4 - 11.3 x10*3/uL Final    nRBC 09/12/2024 0.0  0.0 - 0.0 /100 WBCs Final    RBC 09/12/2024 3.94 (L)  4.00 - 5.20 x10*6/uL Final    Hemoglobin 09/12/2024 12.2  12.0 - 16.0 g/dL Final    Hematocrit 09/12/2024 37.6  36.0 - 46.0 % Final    MCV 09/12/2024 95  80 - 100 fL Final    MCH 09/12/2024 31.0  26.0 - 34.0 pg Final    MCHC 09/12/2024 32.4  32.0 - 36.0 g/dL Final    RDW 09/12/2024 11.9  11.5 - 14.5 % Final    Platelets 09/12/2024 210  150 - 450 " x10*3/uL Final    Neutrophils % 09/12/2024 89.9  40.0 - 80.0 % Final    Immature Granulocytes %, Automated 09/12/2024 0.4  0.0 - 0.9 % Final    Immature Granulocyte Count (IG) includes promyelocytes, myelocytes and metamyelocytes but does not include bands. Percent differential counts (%) should be interpreted in the context of the absolute cell counts (cells/UL).    Lymphocytes % 09/12/2024 1.8  13.0 - 44.0 % Final    Monocytes % 09/12/2024 7.5  2.0 - 10.0 % Final    Eosinophils % 09/12/2024 0.0  0.0 - 6.0 % Final    Basophils % 09/12/2024 0.4  0.0 - 2.0 % Final    Neutrophils Absolute 09/12/2024 14.43 (H)  1.60 - 5.50 x10*3/uL Final    Percent differential counts (%) should be interpreted in the context of the absolute cell counts (cells/uL).    Immature Granulocytes Absolute, Au* 09/12/2024 0.06  0.00 - 0.50 x10*3/uL Final    Lymphocytes Absolute 09/12/2024 0.29 (L)  0.80 - 3.00 x10*3/uL Final    Monocytes Absolute 09/12/2024 1.21 (H)  0.05 - 0.80 x10*3/uL Final    Eosinophils Absolute 09/12/2024 0.00  0.00 - 0.40 x10*3/uL Final    Basophils Absolute 09/12/2024 0.06  0.00 - 0.10 x10*3/uL Final    Glucose 09/12/2024 130 (H)  74 - 99 mg/dL Final    Sodium 09/12/2024 135 (L)  136 - 145 mmol/L Final    Potassium 09/12/2024 4.6  3.5 - 5.3 mmol/L Final    MODERATE HEMOLYSIS DETECTED. The result may be falsely elevated due to hemolysis or other interferents. Clinical correlation is recommended. Repeat testing may be considered.    Chloride 09/12/2024 102  98 - 107 mmol/L Final    Bicarbonate 09/12/2024 24  21 - 32 mmol/L Final    Anion Gap 09/12/2024 14  10 - 20 mmol/L Final    Urea Nitrogen 09/12/2024 23  6 - 23 mg/dL Final    Creatinine 09/12/2024 1.08 (H)  0.50 - 1.05 mg/dL Final    eGFR 09/12/2024 53 (L)  >60 mL/min/1.73m*2 Final    Calculations of estimated GFR are performed using the 2021 CKD-EPI Study Refit equation without the race variable for the IDMS-Traceable creatinine  methods.  https://jasn.asnjournals.org/content/early/2021/09/22/ASN.7192010184    Calcium 09/12/2024 8.9  8.6 - 10.3 mg/dL Final    Albumin 09/12/2024 4.0  3.4 - 5.0 g/dL Final    Alkaline Phosphatase 09/12/2024 78  33 - 136 U/L Final    Total Protein 09/12/2024 6.6  6.4 - 8.2 g/dL Final    AST 09/12/2024 13  9 - 39 U/L Final    MODERATE HEMOLYSIS DETECTED. The result may be falsely elevated due to hemolysis or other interferents. Clinical correlation is recommended. Repeat testing may be considered.    Bilirubin, Total 09/12/2024 0.9  0.0 - 1.2 mg/dL Final    ALT 09/12/2024 <3 (L)  7 - 45 U/L Final    Patients treated with Sulfasalazine may generate falsely decreased results for ALT.    Lactate 09/12/2024 1.4  0.4 - 2.0 mmol/L Final    Blood Culture 09/12/2024 No growth at 3 days   Preliminary    Blood Culture 09/12/2024 No growth at 3 days   Preliminary    Color, Urine 09/12/2024 Yellow  Light-Yellow, Yellow, Dark-Yellow Final    Appearance, Urine 09/12/2024 Clear  Clear Final    Specific Gravity, Urine 09/12/2024 1.018  1.005 - 1.035 Final    pH, Urine 09/12/2024 6.0  5.0, 5.5, 6.0, 6.5, 7.0, 7.5, 8.0 Final    Protein, Urine 09/12/2024 30 (1+) (A)  NEGATIVE, 10 (TRACE), 20 (TRACE) mg/dL Final    Glucose, Urine 09/12/2024 Normal  Normal mg/dL Final    Blood, Urine 09/12/2024 0.03 (TRACE) (A)  NEGATIVE Final    Ketones, Urine 09/12/2024 10 (1+) (A)  NEGATIVE mg/dL Final    Bilirubin, Urine 09/12/2024 NEGATIVE  NEGATIVE Final    Urobilinogen, Urine 09/12/2024 Normal  Normal mg/dL Final    Nitrite, Urine 09/12/2024 NEGATIVE  NEGATIVE Final    Leukocyte Esterase, Urine 09/12/2024 NEGATIVE  NEGATIVE Final    Extra Tube 09/12/2024 Hold for add-ons.   Final    Auto resulted.    WBC, Urine 09/12/2024 1-5  1-5, NONE /HPF Final    RBC, Urine 09/12/2024 1-2  NONE, 1-2, 3-5 /HPF Final    Squamous Epithelial Cells, Urine 09/12/2024 1-9 (SPARSE)  Reference range not established. /HPF Final    Mucus, Urine 09/12/2024 FEW   Reference range not established. /LPF Final    Ammonia 09/12/2024 22  16 - 53 umol/L Final    Glucose 09/13/2024 86  74 - 99 mg/dL Final    Sodium 09/13/2024 139  136 - 145 mmol/L Final    Potassium 09/13/2024 3.3 (L)  3.5 - 5.3 mmol/L Final    Chloride 09/13/2024 101  98 - 107 mmol/L Final    Bicarbonate 09/13/2024 28  21 - 32 mmol/L Final    Anion Gap 09/13/2024 13  10 - 20 mmol/L Final    Urea Nitrogen 09/13/2024 21  6 - 23 mg/dL Final    Creatinine 09/13/2024 0.91  0.50 - 1.05 mg/dL Final    eGFR 09/13/2024 66  >60 mL/min/1.73m*2 Final    Calculations of estimated GFR are performed using the 2021 CKD-EPI Study Refit equation without the race variable for the IDMS-Traceable creatinine methods.  https://jasn.asnjournals.org/content/early/2021/09/22/ASN.3151845203    Calcium 09/13/2024 8.6  8.6 - 10.3 mg/dL Final    WBC 09/13/2024 11.6 (H)  4.4 - 11.3 x10*3/uL Final    nRBC 09/13/2024 0.0  0.0 - 0.0 /100 WBCs Final    RBC 09/13/2024 3.77 (L)  4.00 - 5.20 x10*6/uL Final    Hemoglobin 09/13/2024 11.6 (L)  12.0 - 16.0 g/dL Final    Hematocrit 09/13/2024 36.5  36.0 - 46.0 % Final    MCV 09/13/2024 97  80 - 100 fL Final    MCH 09/13/2024 30.8  26.0 - 34.0 pg Final    MCHC 09/13/2024 31.8 (L)  32.0 - 36.0 g/dL Final    RDW 09/13/2024 12.0  11.5 - 14.5 % Final    Platelets 09/13/2024 212  150 - 450 x10*3/uL Final    Magnesium 09/13/2024 2.39  1.60 - 2.40 mg/dL Final    Protime 09/13/2024 12.1  9.8 - 12.8 seconds Final    INR 09/13/2024 1.1  0.9 - 1.1 Final    Phosphorus 09/13/2024 3.2  2.5 - 4.9 mg/dL Final    The performance characteristics of phosphorus testing in heparinized plasma have been validated by the individual  laboratory site where testing is performed. Testing on heparinized plasma is not approved by the FDA; however, such approval is not necessary.    Color, Urine 09/14/2024 Light-Yellow  Light-Yellow, Yellow, Dark-Yellow Final    Appearance, Urine 09/14/2024 Clear  Clear Final    Specific Gravity, Urine  09/14/2024 1.009  1.005 - 1.035 Final    pH, Urine 09/14/2024 6.5  5.0, 5.5, 6.0, 6.5, 7.0, 7.5, 8.0 Final    Protein, Urine 09/14/2024 NEGATIVE  NEGATIVE, 10 (TRACE), 20 (TRACE) mg/dL Final    Glucose, Urine 09/14/2024 Normal  Normal mg/dL Final    Blood, Urine 09/14/2024 NEGATIVE  NEGATIVE Final    Ketones, Urine 09/14/2024 TRACE (A)  NEGATIVE mg/dL Final    Bilirubin, Urine 09/14/2024 NEGATIVE  NEGATIVE Final    Urobilinogen, Urine 09/14/2024 Normal  Normal mg/dL Final    Nitrite, Urine 09/14/2024 NEGATIVE  NEGATIVE Final    Leukocyte Esterase, Urine 09/14/2024 NEGATIVE  NEGATIVE Final    Extra Tube 09/14/2024 Hold for add-ons.   Final    Auto resulted.    Glucose 09/14/2024 75  74 - 99 mg/dL Final    Sodium 09/14/2024 141  136 - 145 mmol/L Final    Potassium 09/14/2024 3.6  3.5 - 5.3 mmol/L Final    Chloride 09/14/2024 106  98 - 107 mmol/L Final    Bicarbonate 09/14/2024 28  21 - 32 mmol/L Final    Anion Gap 09/14/2024 11  10 - 20 mmol/L Final    Urea Nitrogen 09/14/2024 19  6 - 23 mg/dL Final    Creatinine 09/14/2024 0.78  0.50 - 1.05 mg/dL Final    eGFR 09/14/2024 79  >60 mL/min/1.73m*2 Final    Calculations of estimated GFR are performed using the 2021 CKD-EPI Study Refit equation without the race variable for the IDMS-Traceable creatinine methods.  https://jasn.asnjournals.org/content/early/2021/09/22/ASN.0175617640    Calcium 09/14/2024 8.6  8.6 - 10.3 mg/dL Final    WBC 09/14/2024 7.1  4.4 - 11.3 x10*3/uL Final    nRBC 09/14/2024 0.0  0.0 - 0.0 /100 WBCs Final    RBC 09/14/2024 3.72 (L)  4.00 - 5.20 x10*6/uL Final    Hemoglobin 09/14/2024 11.5 (L)  12.0 - 16.0 g/dL Final    Hematocrit 09/14/2024 36.2  36.0 - 46.0 % Final    MCV 09/14/2024 97  80 - 100 fL Final    MCH 09/14/2024 30.9  26.0 - 34.0 pg Final    MCHC 09/14/2024 31.8 (L)  32.0 - 36.0 g/dL Final    RDW 09/14/2024 12.1  11.5 - 14.5 % Final    Platelets 09/14/2024 191  150 - 450 x10*3/uL Final    Magnesium 09/14/2024 2.27  1.60 - 2.40 mg/dL Final     Glucose 09/15/2024 80  74 - 99 mg/dL Final    Sodium 09/15/2024 139  136 - 145 mmol/L Final    Potassium 09/15/2024 3.9  3.5 - 5.3 mmol/L Final    Chloride 09/15/2024 104  98 - 107 mmol/L Final    Bicarbonate 09/15/2024 26  21 - 32 mmol/L Final    Anion Gap 09/15/2024 13  10 - 20 mmol/L Final    Urea Nitrogen 09/15/2024 23  6 - 23 mg/dL Final    Creatinine 09/15/2024 0.66  0.50 - 1.05 mg/dL Final    eGFR 09/15/2024 >90  >60 mL/min/1.73m*2 Final    Calculations of estimated GFR are performed using the 2021 CKD-EPI Study Refit equation without the race variable for the IDMS-Traceable creatinine methods.  https://jasn.asnjournals.org/content/early/2021/09/22/ASN.0720212094    Calcium 09/15/2024 8.5 (L)  8.6 - 10.3 mg/dL Final    WBC 09/15/2024 6.2  4.4 - 11.3 x10*3/uL Final    nRBC 09/15/2024 0.0  0.0 - 0.0 /100 WBCs Final    RBC 09/15/2024 3.43 (L)  4.00 - 5.20 x10*6/uL Final    Hemoglobin 09/15/2024 10.6 (L)  12.0 - 16.0 g/dL Final    Hematocrit 09/15/2024 32.7 (L)  36.0 - 46.0 % Final    MCV 09/15/2024 95  80 - 100 fL Final    MCH 09/15/2024 30.9  26.0 - 34.0 pg Final    MCHC 09/15/2024 32.4  32.0 - 36.0 g/dL Final    RDW 09/15/2024 12.2  11.5 - 14.5 % Final    Platelets 09/15/2024 208  150 - 450 x10*3/uL Final    Magnesium 09/15/2024 2.10  1.60 - 2.40 mg/dL Final    Glucose 09/15/2024 112 (H)  74 - 99 mg/dL Final    Sodium 09/15/2024 139  136 - 145 mmol/L Final    Potassium 09/15/2024 4.5  3.5 - 5.3 mmol/L Final    Chloride 09/15/2024 105  98 - 107 mmol/L Final    Bicarbonate 09/15/2024 25  21 - 32 mmol/L Final    Anion Gap 09/15/2024 14  10 - 20 mmol/L Final    Urea Nitrogen 09/15/2024 30 (H)  6 - 23 mg/dL Final    Creatinine 09/15/2024 0.85  0.50 - 1.05 mg/dL Final    eGFR 09/15/2024 71  >60 mL/min/1.73m*2 Final    Calculations of estimated GFR are performed using the 2021 CKD-EPI Study Refit equation without the race variable for the IDMS-Traceable creatinine  methods.  https://jasn.asnjournals.org/content/early/2021/09/22/ASN.5877723308    Calcium 09/15/2024 9.2  8.6 - 10.3 mg/dL Final    Glucose 09/16/2024 104 (H)  74 - 99 mg/dL Final    Sodium 09/16/2024 140  136 - 145 mmol/L Final    Potassium 09/16/2024 4.0  3.5 - 5.3 mmol/L Final    Chloride 09/16/2024 106  98 - 107 mmol/L Final    Bicarbonate 09/16/2024 27  21 - 32 mmol/L Final    Anion Gap 09/16/2024 11  10 - 20 mmol/L Final    Urea Nitrogen 09/16/2024 22  6 - 23 mg/dL Final    Creatinine 09/16/2024 0.66  0.50 - 1.05 mg/dL Final    eGFR 09/16/2024 >90  >60 mL/min/1.73m*2 Final    Calculations of estimated GFR are performed using the 2021 CKD-EPI Study Refit equation without the race variable for the IDMS-Traceable creatinine methods.  https://jasn.asnjournals.org/content/early/2021/09/22/ASN.9043448889    Calcium 09/16/2024 8.5 (L)  8.6 - 10.3 mg/dL Final    WBC 09/16/2024 6.7  4.4 - 11.3 x10*3/uL Final    nRBC 09/16/2024 0.0  0.0 - 0.0 /100 WBCs Final    RBC 09/16/2024 3.64 (L)  4.00 - 5.20 x10*6/uL Final    Hemoglobin 09/16/2024 11.1 (L)  12.0 - 16.0 g/dL Final    Hematocrit 09/16/2024 35.4 (L)  36.0 - 46.0 % Final    MCV 09/16/2024 97  80 - 100 fL Final    MCH 09/16/2024 30.5  26.0 - 34.0 pg Final    MCHC 09/16/2024 31.4 (L)  32.0 - 36.0 g/dL Final    RDW 09/16/2024 12.2  11.5 - 14.5 % Final    Platelets 09/16/2024 242  150 - 450 x10*3/uL Final    Magnesium 09/16/2024 2.11  1.60 - 2.40 mg/dL Final         Imaging     XR chest 1 view  Narrative: Interpreted By:  Roosevelt Richardson,   STUDY:  XR CHEST 1 VIEW;  9/12/2024 8:54 pm      INDICATION:  Signs/Symptoms:sepsis.      COMPARISON:  8/31/2024      ACCESSION NUMBER(S):  [id number]      ORDERING CLINICIAN:  DENANE MALONE      FINDINGS:  There is stable cardiomegaly. There is stable mild prominence of  interstitial opacities. Mild left basilar atelectasis. No  pneumothorax.      Impression: Stable mild prominence of interstitial opacities.      No focal airspace  consolidation.      MACRO:  None      Signed by: Roosevelt Richardson 9/12/2024 9:14 PM  Dictation workstation:   [id number]  CT head wo IV contrast  Narrative: Interpreted By:  Gaston Hauser,   STUDY:  CT HEAD WO IV CONTRAST;  9/12/2024 8:50 pm      INDICATION:  Signs/Symptoms:fall, altered mental status.      COMPARISON:  09/01/2024      ACCESSION NUMBER(S):  [id number]      ORDERING CLINICIAN:  DEANNE MALONE      TECHNIQUE:  Noncontrast axial CT scan of head was performed. Angled reformats in  brain and bone windows were generated. The images were reviewed in  bone, brain, blood and soft tissue windows.      FINDINGS:  CSF Spaces: Stable brain atrophy evidence by prominence of  ventricles, sulci and cisterns. There is no extraaxial fluid  collection.      Parenchyma: Confluent areas of subcortical and periventricular white  matter changes which given patient's age are suggestive of chronic  small vessel ischemic disease. The grey-white differentiation is  intact. There is no mass effect or midline shift.  There is no  intracranial hemorrhage.      Calvarium: The calvarium is unremarkable.      Paranasal sinuses and mastoids: Visualized paranasal sinuses and  mastoids are clear.      Impression: No significant interval changes since previous exam. No evidence of  acute cortical infarct or intracranial hemorrhage. If there is  persistent clinical concern for acute cortical infarction, MRI with  diffusion-weighted images is a better means for further evaluation as  clinically warranted.      MACRO:  None      Signed by: Gaston Hauser 9/12/2024 9:06 PM  Dictation workstation:   [id number]       Patient Active Problem List   Diagnosis    Abnormality of gait    Back stiffness    Compression fracture of L1 vertebra (Multi)    Constipation    HLD (hyperlipidemia)    HTN (hypertension)    Leg weakness    Lower back pain    Palpitations    Parkinson's disease (Multi)    Acute metabolic encephalopathy    Acute cystitis without  hematuria    Hallucinations    Fall    UTI (urinary tract infection)    Cellulitis of left forearm    Neutrophilic leukocytosis    EULALIA (acute kidney injury) (CMS-[facility])    Atrial fibrillation and flutter (Multi)         Assessment/Plan   Assessment & Plan  Acute metabolic encephalopathy    Parkinson's disease (Multi)    Hallucinations    UTI (urinary tract infection)    Cellulitis of left forearm    Neutrophilic leukocytosis    EULALIA (acute kidney injury) (CMS-[facility])    Atrial fibrillation and flutter (Multi)      Atrial fibrillation patient converted to normal sinus rate will order echocardiogram cardiology consult start anticoagulation hide CHADS2 score  Urinary tract infection doing better  Ultimately patient will be transferred to SNF       I spent 25 minutes in the professional and overall care of this patient.      DARRIAN Hayes MD

## 2024-09-16 NOTE — PROGRESS NOTES
Occupational Therapy    OT Treatment    Patient Name: Jennifer Murrell  MRN: [id number]  Today's Date: 9/16/2024  Time Calculation  Start Time: 1000  Stop Time: 1027  Time Calculation (min): 27 min       2103/2103-A    Assessment:  End of Session Communication: Bedside nurse  End of Session Patient Position: Up in chair, Alarm on       Plan:  OT Frequency: 3 times per week  OT Discharge Recommendations: Moderate intensity level of continued care     Subjective      09/16/24 1000   OT Last Visit   OT Received On 09/16/24   General   Prior to Session Communication Bedside nurse   Patient Position Received Up in chair;Alarm on   Preferred Learning Style verbal;visual   General Comment Pt pleasant and cooperative, agreeable to therapy. Pt can be impulsive, cues for safety.   Pain Assessment   Pain Assessment 0-10   0-10 (Numeric) Pain Score 0 - No pain   Cognition   Overall Cognitive Status   (Pt A&Ox3, confused at times and hallucinating at start of session, RN aware.)   Grooming   Grooming Level of Assistance Minimum assistance   Grooming Where Assessed Standing sinkside   Grooming Comments Pt performed grooming standing at sink, mod v/t cues for sequencing.   UE Dressing   UE Dressing Level of Assistance Minimum assistance   UE Dressing Where Assessed Chair   UE Dressing Comments doff/don gown   Transfers   Transfer Yes   Transfer 1   Transfer From 1 Sit to   Transfer to 1 Stand   Technique 1 Sit to stand;Stand to sit   Transfer Device 1 Gait belt;Walker   Transfer Level of Assistance 1 Minimum assistance   Trials/Comments 1 Pt initially Mod A however with v/t cues on safety and proper technique pt improved to Min A. FM performed within household distances Min A, chair>sink>chair>hallway>chair.   IP OT Assessment   End of Session Communication Bedside nurse   End of Session Patient Position Up in chair;Alarm on   Inpatient Plan   OT Frequency 3 times per week   OT Discharge Recommendations Moderate intensity level of  continued care         Outcome Measures:[facility] Daily Activity  Putting on and taking off regular lower body clothing: A lot  Bathing (including washing, rinsing, drying): A lot  Putting on and taking off regular upper body clothing: A little  Toileting, which includes using toilet, bedpan or urinal: A little  Taking care of personal grooming such as brushing teeth: A little  Eating Meals: A little  Daily Activity - Total Score: 16  Education Documentation  Body Mechanics, taught by LEANDRO Madrid at 9/16/2024 10:43 AM.  Learner: Patient  Readiness: Acceptance  Method: Explanation, Demonstration  Response: Verbalizes Understanding, Demonstrated Understanding, Needs Reinforcement    Precautions, taught by LEANDRO Madrid at 9/16/2024 10:43 AM.  Learner: Patient  Readiness: Acceptance  Method: Explanation, Demonstration  Response: Verbalizes Understanding, Demonstrated Understanding, Needs Reinforcement    ADL Training, taught by LEANDRO Madrid at 9/16/2024 10:43 AM.  Learner: Patient  Readiness: Acceptance  Method: Explanation, Demonstration  Response: Verbalizes Understanding, Demonstrated Understanding, Needs Reinforcement    Education Comments  No comments found.            Goals:  Encounter Problems       Encounter Problems (Active)       Dressings Lower Extremities       STG - Patient to complete lower body dressing SUP (Progressing)       Start:  09/13/24    Expected End:  09/27/24               Functional Balance       LTG - Patient will maintain standing and sitting balance to allow for completion of daily activities (Progressing)       Start:  09/13/24    Expected End:  09/27/24               Grooming       STG - Patient completes grooming SUP (Progressing)       Start:  09/13/24    Expected End:  09/27/24               OT Transfers       STG - Patient will transfer sit to and from stand SUP (Progressing)       Start:  09/13/24    Expected End:  09/27/24

## 2024-09-16 NOTE — CARE PLAN
The patient's goals for the shift include remaining comfortable throughout shift.     The clinical goals for the shift include See POC      Problem: Skin  Goal: Decreased wound size/increased tissue granulation at next dressing change  Outcome: Progressing  Goal: Participates in plan/prevention/treatment measures  Outcome: Progressing  Goal: Prevent/manage excess moisture  Outcome: Progressing  Flowsheets (Taken 9/15/2024 9620)  Prevent/manage excess moisture: Cleanse incontinence/protect with barrier cream  Goal: Prevent/minimize sheer/friction injuries  Outcome: Progressing  Goal: Promote/optimize nutrition  Outcome: Progressing  Goal: Promote skin healing  Outcome: Progressing     Problem: Pain - Adult  Goal: Verbalizes/displays adequate comfort level or baseline comfort level  Outcome: Progressing     Problem: Safety - Adult  Goal: Free from fall injury  Outcome: Progressing     Problem: Discharge Planning  Goal: Discharge to home or other facility with appropriate resources  Outcome: Progressing     Problem: Chronic Conditions and Co-morbidities  Goal: Patient's chronic conditions and co-morbidity symptoms are monitored and maintained or improved  Outcome: Progressing     Problem: Respiratory  Goal: No signs of respiratory distress (eg. Use of accessory muscles. Peds grunting)  Outcome: Progressing  Goal: Increase self care and/or family involvement in next 24 hours  Outcome: Progressing     Problem: Dressings Lower Extremities  Goal: STG - Patient to complete lower body dressing SUP  Outcome: Progressing     Problem: Grooming  Goal: STG - Patient completes grooming SUP  Outcome: Progressing     Problem: Fall/Injury  Goal: Not fall by end of shift  Outcome: Progressing  Goal: Be free from injury by end of the shift  Outcome: Progressing  Goal: Verbalize understanding of personal risk factors for fall in the hospital  Outcome: Progressing  Goal: Verbalize understanding of risk factor reduction measures to  prevent injury from fall in the home  Outcome: Progressing  Goal: Use assistive devices by end of the shift  Outcome: Progressing  Goal: Pace activities to prevent fatigue by end of the shift  Outcome: Progressing

## 2024-09-16 NOTE — PROGRESS NOTES
Physical Therapy    Physical Therapy Treatment    Patient Name: Jennifer Murrell  MRN: [id number]  Today's Date: 9/16/2024  Time Calculation  Start Time: 1005  Stop Time: 1030  Time Calculation (min): 25 min     2103/2103-A     09/16/24 1005   PT  Visit   PT Received On 09/16/24   Response to Previous Treatment Patient with no complaints from previous session.   General   Reason for Referral P/w AMS and fever. Per ER note pt mental status worse than baseline and aggressive behavior noted at her facility. Pt admitted for acute metabolic encephalopathy.   Referred By Dr. Hayes   Family/Caregiver Present No   Prior to Session Communication Bedside nurse   Patient Position Received Up in chair;Alarm on   Preferred Learning Style verbal   General Comment Pt. was seated in the chair when I arrived. She was agreeable to PT. No c/o pain. Left hand sutures.   Precautions   Medical Precautions Fall precautions   Precautions Comment Bed/Chair Alarm, Confusion/Hallucinations.   Vital Signs   Vitals Session During PT  HR 65-73  BP 99/57 Left UE  SpO2 98% Room Air     Pain Assessment   0-10 (Numeric) Pain Score 0 - No pain   Cognition   Overall Cognitive Status   A&O x3.Confused/Hallucinating. Talking to her son as if he was in the room.     Impulsive Mildly    Pt. started standing up before therapist was ready for her to move. Pt linens and lines were tangled.     Coordination   Coordination Comment Eliseo impaired coordination with transfers annd ambulation, Posterrior lean and loss of balance with sit to stand,   Static Standing Balance   Static Standing-Level of Assistance Minimum assistance   Static Standing-Comment/Number of Minutes Walker   Dynamic Standing Balance   Dynamic Standing-Balance Support Bilateral upper extremity supported   Dynamic Standing-Level of Assistance Moderate assistance   Dynamic Standing-Comments Use waslker or sink for support.   Ambulation/Gait Training   Ambulation/Gait Training Performed Yes    Ambulation/Gait Training 1   Surface 1 Level tile   Device 1 Rolling walker   Gait Support Devices Gait belt   Assistance 1 Minimum assistance   Quality of Gait 1 NBOS;Diminished heel strike;Decreased step length    Pt needed frequent verbal cues for posture and walker use. Gait was slow to moderately paced. Cues walker/step sequencing nd assist to manage the walker when turning.     Comments/Distance (ft) 1   Pt, ambulated 20'x1, seated rest then ambulated 35'x2. Cues for walker management and step sequencing. when turning.     Transfers   Transfer Yes   Transfer 1   Transfer From 1 Bed to   Transfer to 1 Stand   Technique 1 Sit to stand  Stand to sit   Transfer Device 1 Walker;  Gait belt   Transfer Level of Assistance 1 Minimum assistance   Trials/Comments 1 Pt stood with assist x1 to stead and prevent posterion lean loses of balance.   Activity Tolerance   Endurance Tolerates 10 - 20 min exercise with multiple rests   PT Assessment   PT Assessment Results Decreased strength;Decreased range of motion;Decreased endurance;Impaired balance;Decreased mobility;Decreased safety awareness;Decreased coordination;Decreased cognition;Impaired judgement   Rehab Prognosis Good   End of Session Communication Bedside nurse   End of Session Patient Position Up in chair;Alarm on  (call light within reach)   PT Plan   PT Plan Ongoing PT   PT Frequency 4 times per week   PT Discharge Recommendations Moderate intensity level of continued care;24 hr supervision due to cognition   Equipment Recommended upon Discharge Wheeled walker       Assessment/Plan   PT Assessment  PT Assessment Results: Decreased strength, Decreased range of motion, Decreased endurance, Impaired balance, Decreased mobility, Decreased safety awareness, Decreased coordination, Decreased cognition, Impaired judgement  Rehab Prognosis: Good  End of Session Communication: Bedside nurse  End of Session Patient Position: Up in chair, Alarm on (call light within  reach)     PT Plan  Treatment/Interventions: Bed mobility, Transfer training, Gait training, Stair training, Balance training, Neuromuscular re-education, Endurance training, Strengthening, Range of motion, Therapeutic exercise, Therapeutic activity, Home exercise program, Positioning, Postural re-education  PT Plan: Ongoing PT  PT Frequency: 4 times per week  PT Discharge Recommendations: Moderate intensity level of continued care, 24 hr supervision due to cognition  Equipment Recommended upon Discharge: Wheeled walker  PT Recommended Transfer Status: Assist x1, Assistive device (mod A)  PT - OK to Discharge: Yes (Once medically cleared to next level of care.)    Outcome Measures:  [facility] Basic Mobility  Turning from your back to your side while in a flat bed without using bedrails: A little  Moving from lying on your back to sitting on the side of a flat bed without using bedrails: A little  Moving to and from bed to chair (including a wheelchair): A little  Standing up from a chair using your arms (e.g. wheelchair or bedside chair): A lot  To walk in hospital room: A little  Climbing 3-5 steps with railing: A lot  Basic Mobility - Total Score: 16                             EDUCATION:     Education Documentation  Precautions, taught by Richmond Dietz PTA at 9/16/2024 10:42 AM.  Learner: Patient  Readiness: Eager  Method: Demonstration  Response: Needs Reinforcement    Body Mechanics, taught by Richmond Dietz PTA at 9/16/2024 10:42 AM.  Learner: Patient  Readiness: Eager  Method: Demonstration  Response: Needs Reinforcement    Mobility Training, taught by Richmond Dietz PTA at 9/16/2024 10:42 AM.  Learner: Patient  Readiness: Eager  Method: Demonstration  Response: Needs Reinforcement    Education Comments  No comments found.        GOALS:  Encounter Problems       Encounter Problems (Active)       Balance       Pt will demonstrate static/dynamic standing balance for >/= 5 min CGA without evidence of instability or  LOB with functional mobility tasks.         Start:  09/13/24    Expected End:  09/27/24               Mobility       Pt will be able to ambulate >/= 50 ft with LRD CGA with good safety awareness.        Start:  09/13/24    Expected End:  09/27/24            Pt will complete supine, seated and standing exercises to maintain/improve overall strength with minimal verbal cues.         Start:  09/13/24    Expected End:  09/27/24               PT Transfers       Pt will be able to complete all bed mobility tasks mod I.       Start:  09/13/24    Expected End:  09/27/24            Pt will be able to complete all transfers with LRD CGA demonstrating good safety awareness and proper body mechanics.        Start:  09/13/24    Expected End:  09/27/24               Pain - Adult

## 2024-09-16 NOTE — SIGNIFICANT EVENT
Notified by RN around 2250 that patient's HR was sustaining in 140s, 12-lead suspicious for Afib w/ RVR. No documented history of Afib noted in medical record, patient unable to give any history secondary to advanced dementia. Attempted IV metoprolol 5mg x 2, patient developed hypotension, 80s/40s that improved to 90s/50s with LR bolus. Patient unable to be given amiodarone due to cross reaction with Seroquel, Qtc already 403 on EKG. Will order Cardizem infusion with titration, only able to be done on SD unit, orders for transfer placed. [id number] ~3, high risk, placed on subcutaneous Lovenox.  Consult to cardiology also placed.     Additional time spent in the overall care of this patient: 25 minutes

## 2024-09-16 NOTE — CONSULTS
"Consults    Reason For Consult  New Afib with RVR    History Of Present Illness  Jennifer Murrell is a 78 y.o. female with PMHx significant for HTN, HLD, parkinson's disease, recurrent falls, dementia and dementia who presented to [facility] from her rehab center for confusion and aggression, was treated for UTI and EULALIA, and developed Afib last night. Patient is Aox3 this morning and reports that she did not feel any palpitations during her Afib episode last night. She denies CP, SOB, or lightheadedness. States that she has a Hx of PVCs though she has not felt \"a skipped heartbeat\" in a long time. Also denies abdominal pain, N/V, or urinary burning/stinging. Patient denies Hx of heart disease.     Patient was found to have Afib with RVR with HR in 110s, peaking in 140s. She was started on Cardizem drip with conversion to NSR.        Past Medical History  She has a past medical history of Dementia (Multi), Depression, Hyperlipidemia, Hypertension, Metabolic encephalopathy, Parkinson's disease (Multi), and Recurrent falls.    Surgical History  She has a past surgical history that includes Adenoidectomy and Tonsillectomy.     Social History  She reports that she has quit smoking. Her smoking use included cigarettes. She has never used smokeless tobacco. She reports that she does not drink alcohol and does not use drugs.    Family History  No family history on file.     Allergies  Sulfa (sulfonamide antibiotics) and Sulfamethoxazole-trimethoprim    Review of Systems  As stated in HPI.      Physical Exam  Cardiovascular:      Rate and Rhythm: Normal rate and regular rhythm.      Heart sounds: No murmur heard.     No friction rub. No gallop.   Pulmonary:      Effort: No accessory muscle usage or respiratory distress.      Breath sounds: No stridor. No wheezing or rhonchi.   Abdominal:      General: There is no distension.      Palpations: There is no mass.      Tenderness: There is no abdominal tenderness. There is no guarding " or rebound.   Musculoskeletal:         Right lower leg: No edema.      Left lower leg: No edema.   Neurological:   AOx3      Last Recorded Vitals  /55 (Patient Position: Sitting)   Pulse 60   Temp 36 °C (96.8 °F) (Temporal)   Resp 18   Wt 68.2 kg (150 lb 4.8 oz)   SpO2 98%     Relevant Results  Scheduled medications  apixaban, 5 mg, oral, q12h  carbidopa-levodopa, 1 tablet, oral, Nightly  carbidopa-levodopa, 2 tablet, oral, 4x daily  melatonin, 3 mg, oral, Nightly  metoprolol succinate XL, 25 mg, oral, Daily  polyethylene glycol, 17 g, oral, Daily  QUEtiapine, 25 mg, oral, Nightly  venlafaxine XR, 150 mg, oral, Daily with breakfast  venlafaxine XR, 75 mg, oral, Daily with breakfast      Continuous medications     PRN medications  PRN medications: acetaminophen, QUEtiapine    Results for orders placed or performed during the hospital encounter of 09/12/24 (from the past 24 hour(s))   ECG 12 lead   Result Value Ref Range    Ventricular Rate 66 BPM    Atrial Rate 66 BPM    ND Interval 146 ms    QRS Duration 92 ms    QT Interval 402 ms    QTC Calculation(Bazett) 421 ms    P Axis 88 degrees    R Axis 42 degrees    T Axis 47 degrees    QRS Count 11 beats    Q Onset 220 ms    P Onset 147 ms    P Offset 200 ms    T Offset 421 ms    QTC Fredericia 415 ms   Basic metabolic panel   Result Value Ref Range    Glucose 112 (H) 74 - 99 mg/dL    Sodium 139 136 - 145 mmol/L    Potassium 4.5 3.5 - 5.3 mmol/L    Chloride 105 98 - 107 mmol/L    Bicarbonate 25 21 - 32 mmol/L    Anion Gap 14 10 - 20 mmol/L    Urea Nitrogen 30 (H) 6 - 23 mg/dL    Creatinine 0.85 0.50 - 1.05 mg/dL    eGFR 71 >60 mL/min/1.73m*2    Calcium 9.2 8.6 - 10.3 mg/dL   Electrocardiogram, 12-lead PRN ACS symptoms   Result Value Ref Range    Ventricular Rate 136 BPM    Atrial Rate 136 BPM    ND Interval 136 ms    QRS Duration 88 ms    QT Interval 268 ms    QTC Calculation(Bazett) 403 ms    P Axis 104 degrees    R Axis 15 degrees    T Axis 225 degrees    QRS  Count 23 beats    Q Onset 209 ms    P Onset 88 ms    P Offset 112 ms    T Offset 343 ms    QTC Fredericia 351 ms   Basic metabolic panel   Result Value Ref Range    Glucose 104 (H) 74 - 99 mg/dL    Sodium 140 136 - 145 mmol/L    Potassium 4.0 3.5 - 5.3 mmol/L    Chloride 106 98 - 107 mmol/L    Bicarbonate 27 21 - 32 mmol/L    Anion Gap 11 10 - 20 mmol/L    Urea Nitrogen 22 6 - 23 mg/dL    Creatinine 0.66 0.50 - 1.05 mg/dL    eGFR >90 >60 mL/min/1.73m*2    Calcium 8.5 (L) 8.6 - 10.3 mg/dL   CBC   Result Value Ref Range    WBC 6.7 4.4 - 11.3 x10*3/uL    nRBC 0.0 0.0 - 0.0 /100 WBCs    RBC 3.64 (L) 4.00 - 5.20 x10*6/uL    Hemoglobin 11.1 (L) 12.0 - 16.0 g/dL    Hematocrit 35.4 (L) 36.0 - 46.0 %    MCV 97 80 - 100 fL    MCH 30.5 26.0 - 34.0 pg    MCHC 31.4 (L) 32.0 - 36.0 g/dL    RDW 12.2 11.5 - 14.5 %    Platelets 242 150 - 450 x10*3/uL   Magnesium   Result Value Ref Range    Magnesium 2.11 1.60 - 2.40 mg/dL   Electrocardiogram, 12-lead PRN ACS symptoms   Result Value Ref Range    Ventricular Rate 60 BPM    Atrial Rate 60 BPM    RI Interval 150 ms    QRS Duration 96 ms    QT Interval 404 ms    QTC Calculation(Bazett) 404 ms    P Axis 100 degrees    R Axis 24 degrees    T Axis 41 degrees    QRS Count 10 beats    Q Onset 221 ms    P Onset 146 ms    P Offset 201 ms    T Offset 423 ms    QTC Fredericia 404 ms   Transthoracic Echo (TTE) Complete   Result Value Ref Range    BSA 1.8 m2     Electrocardiogram, 12-lead PRN ACS symptoms    Result Date: 9/16/2024  Sinus tachycardia with Premature supraventricular complexes ST & T wave abnormality, consider inferior ischemia ST & T wave abnormality, consider anterolateral ischemia Abnormal ECG When compared with ECG of 15-SEP-2024 15:52, (unconfirmed) Significant changes have occurred    Electrocardiogram, 12-lead PRN ACS symptoms    Result Date: 9/16/2024  Normal sinus rhythm Normal ECG When compared with ECG of 15-SEP-2024 23:02, (unconfirmed) Premature supraventricular complexes  are no longer Present Vent. rate has decreased BY  76 BPM ST no longer depressed in Inferior leads ST no longer depressed in Anterolateral leads T wave inversion no longer evident in Inferior leads T wave inversion no longer evident in Anterolateral leads    ECG 12 lead    Result Date: 9/16/2024  Normal sinus rhythm Normal ECG When compared with ECG of 31-AUG-2024 23:16, Nonspecific T wave abnormality no longer evident in Lateral leads    XR chest 1 view    Result Date: 9/12/2024  Interpreted By:  Roosevelt Richardson, STUDY: XR CHEST 1 VIEW;  9/12/2024 8:54 pm   INDICATION: Signs/Symptoms:sepsis.   COMPARISON: 8/31/2024   ACCESSION NUMBER(S): [id number]   ORDERING CLINICIAN: DEANNE MALONE   FINDINGS: There is stable cardiomegaly. There is stable mild prominence of interstitial opacities. Mild left basilar atelectasis. No pneumothorax.       Stable mild prominence of interstitial opacities.   No focal airspace consolidation.   MACRO: None   Signed by: Roosevelt Richardson 9/12/2024 9:14 PM Dictation workstation:   [id number]    CT head wo IV contrast    Result Date: 9/12/2024  Interpreted By:  Gaston Hauser, STUDY: CT HEAD WO IV CONTRAST;  9/12/2024 8:50 pm   INDICATION: Signs/Symptoms:fall, altered mental status.   COMPARISON: 09/01/2024   ACCESSION NUMBER(S): [id number]   ORDERING CLINICIAN: DEANNE MALONE   TECHNIQUE: Noncontrast axial CT scan of head was performed. Angled reformats in brain and bone windows were generated. The images were reviewed in bone, brain, blood and soft tissue windows.   FINDINGS: CSF Spaces: Stable brain atrophy evidence by prominence of ventricles, sulci and cisterns. There is no extraaxial fluid collection.   Parenchyma: Confluent areas of subcortical and periventricular white matter changes which given patient's age are suggestive of chronic small vessel ischemic disease. The grey-white differentiation is intact. There is no mass effect or midline shift.  There is no intracranial hemorrhage.    Calvarium: The calvarium is unremarkable.   Paranasal sinuses and mastoids: Visualized paranasal sinuses and mastoids are clear.       No significant interval changes since previous exam. No evidence of acute cortical infarct or intracranial hemorrhage. If there is persistent clinical concern for acute cortical infarction, MRI with diffusion-weighted images is a better means for further evaluation as clinically warranted.   MACRO: None   Signed by: Gaston Hauser 9/12/2024 9:06 PM Dictation workstation:   [id number]    ECG 12 lead    Result Date: 9/5/2024  Normal sinus rhythm Nonspecific ST abnormality Abnormal ECG When compared with ECG of 27-APR-2021 12:29, Nonspecific T wave abnormality now evident in Lateral leads Confirmed by Alek Hughes ([id number]) on 9/5/2024 3:31:02 PM    CT head wo IV contrast    Result Date: 9/1/2024  Interpreted By:  Grecia Mcqueen, STUDY: CT HEAD WO IV CONTRAST;  9/1/2024 1:06 am   INDICATION: Signs/Symptoms:increased hallucinations.   COMPARISON: None.   ACCESSION NUMBER(S): [id number]   ORDERING CLINICIAN: DEANNE MALONE   TECHNIQUE: Axial noncontrast CT images of the head.   FINDINGS: BRAIN PARENCHYMA: Mild deep and periventricular white matter hypodensities are nonspecific, but favored to represent chronic small vessel ischemic changes.  Gray-white matter interfaces are preserved. No mass effect or midline shift.   HEMORRHAGE: No acute intracranial hemorrhage. VENTRICLES and EXTRA-AXIAL SPACES: The ventricles and sulci are within normal limits in size for brain volume. No abnormal extraaxial fluid collection. EXTRACRANIAL SOFT TISSUES: Within normal limits. PARANASAL SINUSES/MASTOIDS:  The visualized paranasal sinuses and mastoid air cells are aerated. CALVARIUM: No depressed skull fracture. No destructive osseous lesion.   OTHER FINDINGS: None.       No acute intracranial abnormality.     MACRO: None   Signed by: Grecia Mcqueen 9/1/2024 1:35 AM Dictation workstation:   [id number]    XR  chest 1 view    Result Date: 9/1/2024  Interpreted By:  Shin Rocha, STUDY: XR CHEST 1 VIEW;  8/31/2024 11:50 pm   INDICATION: Signs/Symptoms:ams.   COMPARISON: None.   ACCESSION NUMBER(S): [id number]   ORDERING CLINICIAN: DEANNE MALONE   FINDINGS:     The cardiomediastinal silhouette and pulmonary vasculature are within normal limits. There is mild prominence of peribronchovascular interstitium. No consolidation, pleural effusion or pneumothorax.         Mild prominence of the peribronchovascular interstitium that could relate to small airways disease such as asthma or bronchitis.   Report   MACRO: None.   Signed by: Shin Rocha 9/1/2024 12:38 AM Dictation workstation:   [id number]         Assessment/Plan     #New onset Atrial Fibrillation: Likely triggered by patient's UTI. Patient is asymptomatic during episode and was able to convert back to NSR after Cardizem drip, so treating with metoprolol and Eliquis will likely be sufficient. Most recent TTE in 2021 showed EF of 60-65% and no regional wall motion abnormalities. Will obtain a new TTE to check for other possible cause of new onset Afib. Start patient on Eliquis 5 mg BID and metoprolol succinate XL 25 mg daily. Maintain K >4 and Mag >2.       Malik Farias DO, PGY1

## 2024-09-16 NOTE — CARE PLAN
Problem: Skin  Goal: Decreased wound size/increased tissue granulation at next dressing change  Outcome: Progressing  Goal: Participates in plan/prevention/treatment measures  Outcome: Progressing  Goal: Prevent/manage excess moisture  Outcome: Progressing  Goal: Prevent/minimize sheer/friction injuries  Outcome: Progressing  Goal: Promote/optimize nutrition  Outcome: Progressing  Goal: Promote skin healing  Outcome: Progressing     Problem: Pain - Adult  Goal: Verbalizes/displays adequate comfort level or baseline comfort level  Outcome: Progressing     Problem: Safety - Adult  Goal: Free from fall injury  Outcome: Progressing     Problem: Discharge Planning  Goal: Discharge to home or other facility with appropriate resources  Outcome: Progressing   The patient's goals for the shift include see plan of care    The clinical goals for the shift include See POC

## 2024-09-16 NOTE — NURSING NOTE
Patient transferred to SD due to afib RVR. Placed on cardizem gtt. Converted an hour after initiation of gtt, now back in NSR. VSS. Safety maintained.

## 2024-09-16 NOTE — NURSING NOTE
Provider notified pt's HR sustaining in 130's, EKG ordered and results sent to provider. 500ml LR bolus given followed by 5mg metoprolol IV push. Provider notified pt's HR still elevated and irregular. Another 5mg metoprolol IV push ordered but pt's BP was 95/51. Provider assessed pt at bedside and put in order to send pt to CCU. Report called at 0330 and pt taken to CCU.

## 2024-09-16 NOTE — PROGRESS NOTES
Pt was accepted to [facility], however pt's dc has been canceled.  Both the facility and pt's son are updated.

## 2024-09-17 VITALS
WEIGHT: 145.06 LBS | HEIGHT: 67 IN | OXYGEN SATURATION: 97 % | TEMPERATURE: 97.7 F | SYSTOLIC BLOOD PRESSURE: 98 MMHG | RESPIRATION RATE: 18 BRPM | HEART RATE: 66 BPM | BODY MASS INDEX: 22.77 KG/M2 | DIASTOLIC BLOOD PRESSURE: 57 MMHG

## 2024-09-17 LAB
ANION GAP SERPL CALC-SCNC: 10 MMOL/L (ref 10–20)
BACTERIA BLD CULT: NORMAL
BACTERIA BLD CULT: NORMAL
BUN SERPL-MCNC: 27 MG/DL (ref 6–23)
CALCIUM SERPL-MCNC: 8.4 MG/DL (ref 8.6–10.3)
CHLORIDE SERPL-SCNC: 106 MMOL/L (ref 98–107)
CO2 SERPL-SCNC: 29 MMOL/L (ref 21–32)
CREAT SERPL-MCNC: 0.75 MG/DL (ref 0.5–1.05)
EGFRCR SERPLBLD CKD-EPI 2021: 83 ML/MIN/1.73M*2
ERYTHROCYTE [DISTWIDTH] IN BLOOD BY AUTOMATED COUNT: 12.5 % (ref 11.5–14.5)
GLUCOSE SERPL-MCNC: 89 MG/DL (ref 74–99)
HCT VFR BLD AUTO: 31.7 % (ref 36–46)
HGB BLD-MCNC: 10.1 G/DL (ref 12–16)
MAGNESIUM SERPL-MCNC: 2.2 MG/DL (ref 1.6–2.4)
MCH RBC QN AUTO: 30.7 PG (ref 26–34)
MCHC RBC AUTO-ENTMCNC: 31.9 G/DL (ref 32–36)
MCV RBC AUTO: 96 FL (ref 80–100)
NRBC BLD-RTO: 0 /100 WBCS (ref 0–0)
PLATELET # BLD AUTO: 210 X10*3/UL (ref 150–450)
POTASSIUM SERPL-SCNC: 4.1 MMOL/L (ref 3.5–5.3)
RBC # BLD AUTO: 3.29 X10*6/UL (ref 4–5.2)
SODIUM SERPL-SCNC: 141 MMOL/L (ref 136–145)
WBC # BLD AUTO: 6.1 X10*3/UL (ref 4.4–11.3)

## 2024-09-17 PROCEDURE — 85027 COMPLETE CBC AUTOMATED: CPT

## 2024-09-17 PROCEDURE — 97116 GAIT TRAINING THERAPY: CPT | Mod: GP,CQ

## 2024-09-17 PROCEDURE — 83735 ASSAY OF MAGNESIUM: CPT

## 2024-09-17 PROCEDURE — 2500000001 HC RX 250 WO HCPCS SELF ADMINISTERED DRUGS (ALT 637 FOR MEDICARE OP): Performed by: NURSE PRACTITIONER

## 2024-09-17 PROCEDURE — 2500000001 HC RX 250 WO HCPCS SELF ADMINISTERED DRUGS (ALT 637 FOR MEDICARE OP): Performed by: INTERNAL MEDICINE

## 2024-09-17 PROCEDURE — 97530 THERAPEUTIC ACTIVITIES: CPT | Mod: GP,CQ

## 2024-09-17 PROCEDURE — 2500000002 HC RX 250 W HCPCS SELF ADMINISTERED DRUGS (ALT 637 FOR MEDICARE OP, ALT 636 FOR OP/ED): Performed by: NURSE PRACTITIONER

## 2024-09-17 PROCEDURE — 2500000004 HC RX 250 GENERAL PHARMACY W/ HCPCS (ALT 636 FOR OP/ED): Performed by: NURSE PRACTITIONER

## 2024-09-17 PROCEDURE — 36415 COLL VENOUS BLD VENIPUNCTURE: CPT

## 2024-09-17 PROCEDURE — 80048 BASIC METABOLIC PNL TOTAL CA: CPT

## 2024-09-17 ASSESSMENT — COGNITIVE AND FUNCTIONAL STATUS - GENERAL
TURNING FROM BACK TO SIDE WHILE IN FLAT BAD: A LITTLE
MOVING FROM LYING ON BACK TO SITTING ON SIDE OF FLAT BED WITH BEDRAILS: A LITTLE
STANDING UP FROM CHAIR USING ARMS: A LITTLE
WALKING IN HOSPITAL ROOM: A LOT
MOBILITY SCORE: 16
MOVING TO AND FROM BED TO CHAIR: A LITTLE
CLIMB 3 TO 5 STEPS WITH RAILING: A LOT

## 2024-09-17 ASSESSMENT — PAIN SCALES - GENERAL
PAINLEVEL_OUTOF10: 0 - NO PAIN

## 2024-09-17 ASSESSMENT — PAIN - FUNCTIONAL ASSESSMENT
PAIN_FUNCTIONAL_ASSESSMENT: 0-10

## 2024-09-17 ASSESSMENT — ACTIVITIES OF DAILY LIVING (ADL): EFFECT OF PAIN ON DAILY ACTIVITIES: .

## 2024-09-17 NOTE — PROGRESS NOTES
Spiritual Care Visit    Clinical Encounter Type  Visited With: Patient  Routine Visit: Introduction              Sacramental Encounters  Sacrament of Sick-Anointing: Anointed                             Taxonomy  Intended Effects: Build relationship of care and support, Convey a calming presence, Preserve dignity and respect  Methods: Offer spiritual/Mu-ism support  Interventions: Perform a Mu-ism rite or ritual, Ask guided questions

## 2024-09-17 NOTE — CARE PLAN
The patient's goals for the shift include see plan of care    The clinical goals for the shift include Pt will maintain NSR throughout shift      Problem: Skin  Goal: Decreased wound size/increased tissue granulation at next dressing change  Outcome: Progressing     Problem: Safety - Adult  Goal: Free from fall injury  Outcome: Progressing     Problem: Discharge Planning  Goal: Discharge to home or other facility with appropriate resources  Outcome: Progressing     Problem: Chronic Conditions and Co-morbidities  Goal: Patient's chronic conditions and co-morbidity symptoms are monitored and maintained or improved  Outcome: Progressing     Problem: Respiratory  Goal: No signs of respiratory distress (eg. Use of accessory muscles. Peds grunting)  Outcome: Progressing     Problem: Dressings Lower Extremities  Goal: STG - Patient to complete lower body dressing SUP  Outcome: Progressing     Problem: Grooming  Goal: STG - Patient completes grooming SUP  Outcome: Progressing     Problem: Fall/Injury  Goal: Not fall by end of shift  Outcome: Progressing

## 2024-09-17 NOTE — NURSING NOTE
Pt remained safe throughout shift. Vitals stable. Pt A+Ox3, disoriented to situation. HR mid 50s to 70s sinus rhythm.

## 2024-09-17 NOTE — DISCHARGE SUMMARY
Discharge Diagnosis  Acute metabolic encephalopathy    Issues Requiring Follow-Up  Discharge to skilled nursing facility    Discharge Meds     Medication List      START taking these medications     melatonin 3 mg tablet; Take 1 tablet (3 mg) by mouth once daily at   bedtime.; Replaces: melatonin 3 mg capsule   metoprolol succinate XL 25 mg 24 hr tablet; Commonly known as:   Toprol-XL; Take 1 tablet (25 mg) by mouth once daily. Do not crush or   chew. Hold for SBP < 100 and/or HR <60.     CHANGE how you take these medications     * QUEtiapine 25 mg tablet; Commonly known as: SEROquel; Take 1 tablet   (25 mg) by mouth once daily at bedtime.; What changed: how much to take   * QUEtiapine 25 mg tablet; Commonly known as: SEROquel; Take 1 tablet   (25 mg) by mouth once daily as needed (agitation).; What changed: You were   already taking a medication with the same name, and this prescription was   added. Make sure you understand how and when to take each.   * venlafaxine  mg 24 hr capsule; Commonly known as: Effexor XR;   Take 1 capsule (150 mg) by mouth once daily.; What changed: how much to   take   * venlafaxine XR 75 mg 24 hr capsule; Commonly known as: Effexor-XR;   Take 1 capsule (75 mg) by mouth once daily.; What changed: when to take   this  * This list has 4 medication(s) that are the same as other medications   prescribed for you. Read the directions carefully, and ask your doctor or   other care provider to review them with you.     CONTINUE taking these medications     * carbidopa-levodopa  mg ER tablet; Commonly known as: Sinemet CR;   Take 1 tablet by mouth once daily at bedtime.   * carbidopa-levodopa  mg tablet; Commonly known as: Sinemet; Take   2 tablets by mouth 4 times a day. At 8a, 11a, 2p & 5p   cyanocobalamin (vitamin B-12) 1,000 mcg tablet extended release;   Commonly known as: Vitamin B-12; Take 1 tablet (1,000 mcg) by mouth once   daily.   polyethylene glycol 17 gram packet;  Commonly known as: Glycolax,   Miralax; Take 17 g by mouth once daily.  * This list has 2 medication(s) that are the same as other medications   prescribed for you. Read the directions carefully, and ask your doctor or   other care provider to review them with you.     STOP taking these medications     amantadine 100 mg tablet; Commonly known as: Symmetrel   cephalexin 250 mg capsule; Commonly known as: Keflex   melatonin 3 mg capsule; Replaced by: melatonin 3 mg tablet       Test Results Pending At Discharge  Pending Labs       No current pending labs.            Hospital Course   Patient was admitted with a change in mental status she was found to have dehydration and mild EULALIA and possible urinary tract infection treated with IV fluid and antibiotic with improvement patient developed atrial fibrillation and started on anticoagulation and beta-blocker patient will be discharged to skilled nursing facility    Pertinent Physical Exam At Time of Discharge  Physical Exam  HENT:      Right Ear: External ear normal.      Left Ear: External ear normal.      Mouth/Throat:      Mouth: Mucous membranes are moist.   Cardiovascular:      Rate and Rhythm: Normal rate and regular rhythm.      Heart sounds: No murmur heard.     No friction rub. No gallop.   Pulmonary:      Effort: No accessory muscle usage or respiratory distress.      Breath sounds: No stridor. No wheezing or rhonchi.   Chest:      Chest wall: No tenderness.   Abdominal:      General: There is no distension.      Palpations: There is no mass.      Tenderness: There is no abdominal tenderness. There is no guarding or rebound.   Musculoskeletal:         General: No deformity or signs of injury.      Cervical back: No rigidity or tenderness. Normal range of motion.      Right lower leg: No edema.      Left lower leg: No edema.   Skin:     Coloration: Skin is not jaundiced or pale.      Findings: No lesion.   Neurological:      General: No focal deficit present.       Mental Status: She is alert, oriented to person, place, and time and easily aroused.      Cranial Nerves: No cranial nerve deficit.      Sensory: No sensory deficit.      Motor: No weakness.         Outpatient Follow-Up  Future Appointments   Date Time Provider Department Center   10/18/2024 11:30 AM GARETH [facility] [id number] Saul Hayes MD

## 2024-09-17 NOTE — PROGRESS NOTES
Pt has a dc order and [facility] can accept today.  Request to [facility] to send 7000, dc paperwork and arrange transport. Transportation arranged for 11:15am; facility, bedside RN, TCC and pt's son (left voicemail) all aware.

## 2024-09-17 NOTE — PROGRESS NOTES
Physical Therapy    Physical Therapy    Physical Therapy Treatment    Patient Name: Jennifer Murrell  MRN: [id number]  Today's Date: 9/17/2024  Time Calculation  Start Time: 0840  Stop Time: 0905  Time Calculation (min): 25 min     2103/2103-A       09/17/24 0840   PT  Visit   PT Received On 09/17/24   Response to Previous Treatment Patient with no complaints from previous session.   General   Reason for Referral P/w AMS and fever. Per ER note pt mental status worse than baseline and aggressive behavior noted at her facility. Pt admitted for acute metabolic encephalopathy.   Referred By Dr. Hayes   Prior to Session Communication Bedside nurse   Patient Position Received Bed, 3 rail up;Alarm on   General Comment Pt agreeable to therapy and cleared for participation   Precautions   Medical Precautions Fall precautions   Pain Assessment   Pain Assessment 0-10   0-10 (Numeric) Pain Score 0 - No pain   Effect of Pain on Daily Activities .   Cognition   Orientation Level Disoriented to situation   Therapeutic Exercise   Therapeutic Exercise Performed Yes   Therapeutic Exercise Activity 1 AP/LAQ/marching/ABD/ADD/heel-toe raises x15   Bed Mobility   Bed Mobility Yes   Bed Mobility 1   Bed Mobility 1 Supine to sitting   Level of Assistance 1 Contact guard;Close supervision   Bed Mobility Comments 1 HOB elevated   Ambulation/Gait Training   Ambulation/Gait Training Performed Yes   Ambulation/Gait Training 1   Surface 1 Level tile   Device 1 Rolling walker   Gait Support Devices Gait belt   Assistance 1 Minimum assistance;Moderate assistance;Minimal verbal cues   Quality of Gait 1 NBOS;Diminished heel strike;Inconsistent stride length;Scissoring   Comments/Distance (ft) 1 10' x2, 40'x2, very NBOS with intermittent scissoring increasing fall risk. Mod cues and demo to increase step width with poor follow through. Wide turning range with scissoring of feet. Grossly unsteady throughout.   Transfers   Transfer Yes   Transfer 1    Transfer From 1 Bed to   Transfer to 1 Stand;Chair with arms   Technique 1 Sit to stand;Stand to sit   Transfer Device 1 Walker;Gait belt   Transfer Level of Assistance 1 Minimum assistance;Moderate verbal cues   Trials/Comments 1 Mod cues for safe UE placement and sequencing with fair follow josef.   Transfers 2   Transfer From 2 Chair with arms to   Transfer to 2 Stand;Sit   Technique 2 Stand to sit;Sit to stand   Transfer Device 2 Gait belt;Walker   Transfer Level of Assistance 2 Minimum assistance;Minimal verbal cues   Trials/Comments 2 Multiple STS transfers including x5 consecutive for increased strength and stability with functional transfers. VC's for hand placement and sequencing, poor follow through pulling up on WW despite v/t cues.   Activity Tolerance   Endurance Tolerates 10 - 20 min exercise with multiple rests   PT Assessment   PT Assessment Results Decreased strength;Decreased endurance;Impaired balance;Decreased mobility   Rehab Prognosis Good   Evaluation/Treatment Tolerance Patient tolerated treatment well   End of Session Communication Bedside nurse   Assessment Comment Pt put forth good effort. Grossly unsteady requiring mod/Woody and mod v/t cues for safety.   End of Session Patient Position Up in chair;Alarm on     Outcome Measures:  [facility] Basic Mobility  Turning from your back to your side while in a flat bed without using bedrails: A little  Moving from lying on your back to sitting on the side of a flat bed without using bedrails: A little  Moving to and from bed to chair (including a wheelchair): A little  Standing up from a chair using your arms (e.g. wheelchair or bedside chair): A little  To walk in hospital room: A lot  Climbing 3-5 steps with railing: A lot  Basic Mobility - Total Score: 16                             EDUCATION:  Outpatient Education  Individual(s) Educated: Patient  Education Provided: Fall Risk  Education Documentation  Handouts, taught by Kym Joy PTA at  9/17/2024  9:18 AM.  Learner: Patient  Readiness: Acceptance  Method: Explanation, Demonstration  Response: Verbalizes Understanding, Demonstrated Understanding, Needs Reinforcement    Precautions, taught by Kym Joy PTA at 9/17/2024  9:18 AM.  Learner: Patient  Readiness: Acceptance  Method: Explanation, Demonstration  Response: Verbalizes Understanding, Demonstrated Understanding, Needs Reinforcement    Body Mechanics, taught by Kym Joy PTA at 9/17/2024  9:18 AM.  Learner: Patient  Readiness: Acceptance  Method: Explanation, Demonstration  Response: Verbalizes Understanding, Demonstrated Understanding, Needs Reinforcement    Home Exercise Program, taught by Kym Joy PTA at 9/17/2024  9:18 AM.  Learner: Patient  Readiness: Acceptance  Method: Explanation, Demonstration  Response: Verbalizes Understanding, Demonstrated Understanding, Needs Reinforcement    Mobility Training, taught by Kym Joy PTA at 9/17/2024  9:18 AM.  Learner: Patient  Readiness: Acceptance  Method: Explanation, Demonstration  Response: Verbalizes Understanding, Demonstrated Understanding, Needs Reinforcement    Education Comments  No comments found.        GOALS:  Encounter Problems       Encounter Problems (Active)       Balance       Pt will demonstrate static/dynamic standing balance for >/= 5 min CGA without evidence of instability or LOB with functional mobility tasks.         Start:  09/13/24    Expected End:  09/27/24               Mobility       Pt will be able to ambulate >/= 50 ft with LRD CGA with good safety awareness.        Start:  09/13/24    Expected End:  09/27/24            Pt will complete supine, seated and standing exercises to maintain/improve overall strength with minimal verbal cues.         Start:  09/13/24    Expected End:  09/27/24               PT Transfers       Pt will be able to complete all bed mobility tasks mod I.       Start:  09/13/24    Expected End:  09/27/24            Pt will be  able to complete all transfers with LRD CGA demonstrating good safety awareness and proper body mechanics.        Start:  09/13/24    Expected End:  09/27/24               Pain - Adult

## 2024-10-02 LAB
ATRIAL RATE: 136 BPM
ATRIAL RATE: 60 BPM
ATRIAL RATE: 66 BPM
P AXIS: 100 DEGREES
P AXIS: 104 DEGREES
P AXIS: 88 DEGREES
P OFFSET: 112 MS
P OFFSET: 200 MS
P OFFSET: 201 MS
P ONSET: 146 MS
P ONSET: 147 MS
P ONSET: 88 MS
PR INTERVAL: 136 MS
PR INTERVAL: 146 MS
PR INTERVAL: 150 MS
Q ONSET: 209 MS
Q ONSET: 220 MS
Q ONSET: 221 MS
QRS COUNT: 10 BEATS
QRS COUNT: 11 BEATS
QRS COUNT: 23 BEATS
QRS DURATION: 88 MS
QRS DURATION: 92 MS
QRS DURATION: 96 MS
QT INTERVAL: 268 MS
QT INTERVAL: 402 MS
QT INTERVAL: 404 MS
QTC CALCULATION(BAZETT): 403 MS
QTC CALCULATION(BAZETT): 404 MS
QTC CALCULATION(BAZETT): 421 MS
QTC FREDERICIA: 351 MS
QTC FREDERICIA: 404 MS
QTC FREDERICIA: 415 MS
R AXIS: 15 DEGREES
R AXIS: 24 DEGREES
R AXIS: 42 DEGREES
T AXIS: 225 DEGREES
T AXIS: 41 DEGREES
T AXIS: 47 DEGREES
T OFFSET: 343 MS
T OFFSET: 421 MS
T OFFSET: 423 MS
VENTRICULAR RATE: 136 BPM
VENTRICULAR RATE: 60 BPM
VENTRICULAR RATE: 66 BPM

## 2024-10-18 ENCOUNTER — APPOINTMENT (OUTPATIENT)
Dept: NEUROLOGY | Facility: CLINIC | Age: 79
End: 2024-10-18
Payer: MEDICARE

## 2024-10-18 DIAGNOSIS — G20.B1 PARKINSON'S DISEASE WITH DYSKINESIA WITHOUT FLUCTUATING MANIFESTATIONS: Primary | ICD-10-CM

## 2024-10-18 DIAGNOSIS — G20.A1 PSYCHOSIS DUE TO PARKINSON'S DISEASE (MULTI): ICD-10-CM

## 2024-10-18 DIAGNOSIS — F06.8 PSYCHOSIS DUE TO PARKINSON'S DISEASE (MULTI): ICD-10-CM

## 2024-10-18 PROCEDURE — 99212 OFFICE O/P EST SF 10 MIN: CPT | Performed by: NURSE PRACTITIONER

## 2024-10-18 NOTE — PROGRESS NOTES
Subjective     Jennifer Murrell is a 79 y.o. year old female who presents with Parkinson's Disease, here for follow up visit.    HPI  Last visit with me  for worsening psychosis. Admitted to the hospital  UTI, metabolic encephalopathy, hospitalized 4-5 days. Rehab x 1.5 weeks, another UTI and [facility] again for 1 week. Then rehab again for 2 weeks, now [facility] in [place]. She likes it there.  Seroquel started.  BP and cholesterol meds stopped.     Presents with son Alek. Denies issues and concerns today. He notes she is 100% better after UTI.     MOTOR SYMPTOMS      +/-                            Comments  Motor sx overall     Tremor + Comes and goes and can be annoying and sometimes    Rigidity + Not bad   Bradykinesia +    Balance/gait + Walker   FOG + Less often, walker helps   Falls -    PT + At SNF PT   Exercise + More active at the [place], exercising     NON MOTOR SYMPTOMS       +/-                               Comments  Orthostatic lightheadedness  -    Cognitive + Better than when she had a UTI though is still forgetful but son notes she is back to baseline, not much confusion at all   Insomnia -    RBD -    Depression -    Anxiety -     Fatigue  - Better now   Hypophonia -    Dysphagia -    Constipation - On regimen   Urinary dysfunction + UTI sx cleared with drinking more water        Social  Lives with: alone--plans for [place]  Help with ADLs: complete         [facility] Meds  Med Dose Time   Carbidopa-levodopa 25/100mg 2 tabs 4 times a day [id number]-5pm   Carbidopa-levodopa 50/200mg 1 tab at bedtime Bedtime/ 8pm   Seroquel 25mg  1 tab daily     Latency unaware    Wearing off Forgets about it    Side effects     Dyskinesia Comes and goes, gets on her nerves    Hallucinations As above    Other None      Prior medications:  Amantadine 100mg 2 times a day (stopped when had UTI/hallucinations)    Pertinent testin24: QTc 440    24 MRI brain:  IMPRESSION  *  "There is no evidence of mass, acute cerebral infarction or acute hemorrhage.    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 Language and learning/memory were relatively spared, demonstrating adequate consolidation and retrieval (with benefit from cues) on information .\"  ...\"Taken together, the current cognitive profile, including deficits in attention, processing speed, and executive function, combined with reported declines in ability to perform IADLs (not due to physical difficulties) are consistent with a dementia level of decline (major neurocognitive disorder F03.90). Etiologically, her history (with onset of cognitive difficulties following gait/tremor disturbance and visual disturbances) and current profile are most consistent with Parkinson's disease. While some contribution from her cerebrovascular risk factors (e.g., hypertension, hyperlipidemia), \"...    MoCA April 2023 was 23/30                  Current Outpatient Medications:     apixaban (Eliquis) 5 mg tablet, Take 1 tablet (5 mg) by mouth every 12 hours., Disp: 60 tablet, Rfl: 0    carbidopa-levodopa (Sinemet CR)  mg ER tablet, Take 1 tablet by mouth once daily at bedtime., Disp: , Rfl:     carbidopa-levodopa (Sinemet)  mg tablet, Take 2 tablets by mouth 4 times a day. At 8a, 11a, 2p & 5p, Disp: , Rfl:     cyanocobalamin, vitamin B-12, (Vitamin B-12) 1,000 mcg tablet extended release, Take 1 tablet (1,000 mcg) by mouth once " daily., Disp: , Rfl:     melatonin 3 mg tablet, Take 1 tablet (3 mg) by mouth once daily at bedtime., Disp: , Rfl:     metoprolol succinate XL (Toprol-XL) 25 mg 24 hr tablet, Take 1 tablet (25 mg) by mouth once daily. Do not crush or chew. Hold for SBP < 100 and/or HR <60., Disp: , Rfl:     polyethylene glycol (Glycolax, Miralax) 17 gram packet, Take 17 g by mouth once daily., Disp: , Rfl:     QUEtiapine (SEROquel) 25 mg tablet, Take 1 tablet (25 mg) by mouth once daily at bedtime., Disp: , Rfl:     QUEtiapine (SEROquel) 25 mg tablet, Take 1 tablet (25 mg) by mouth once daily as needed (agitation)., Disp: , Rfl:     venlafaxine XR (Effexor XR) 150 mg 24 hr capsule, Take 1 capsule (150 mg) by mouth once daily., Disp: , Rfl:     venlafaxine XR (Effexor-XR) 75 mg 24 hr capsule, Take 1 capsule (75 mg) by mouth once daily., Disp: , Rfl:        Objective   There were no vitals filed for this visit.                Physical Exam    Mild upper body swaying/L shoulder shrugging  Walks cautiously but well with roller walker, stooped posture  BLE bradykinesia toe taps 2+  Hand opneing and finger taps 1+    MDS UPDRS 1st Score:          Assessment/Plan   Ms. Jennifer Murrell is a 79 y.o. F with tremor predominant PD, diagnosed around 2013 and within this past year a diagnosis of dementia (NP testing 2/2024). She presents for f/u after last visit where she had worsening osychosis after hospitalization for UTI then a few days after last visit another hospitalization 9/12/24 for UTI. She is on low dose Seroquel. Went to rehab, now in an [place] and doing well, no further hallucinations, motor sx stable. She does have slightly more dyskinesia since we did stop amantadine for hallucinations last visit.     Discussed switching to Crexont to help w/ dyskinesia, she declines at this time. She is hesitant to try anything/make changes that could worsen hallucinations. She will consider it in the future.      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    Prosper Crawley NP-C  Adult/Gerontological Nurse Practitioner   [facility], Department of Neurology  [facility] [place]  [facility]  [address] [place] [address]  Phone: [phone]  Fax: [phone]

## 2024-10-18 NOTE — PATIENT INSTRUCTIONS
#Consider Crexont in the future if dyskinesias become bothersome (wiggly movements)    #Continue meds     Med Dose Time   Carbidopa-levodopa 25/100mg 2 tabs 4 times a day [id number]-5pm   Carbidopa-levodopa 50/200mg 1 tab at bedtime Bedtime/ 8pm     #Follow upin 4m--please call to schedule apt      VINICIUS Serna  Adult/Gerontological Nurse Practitioner   [facility], Department of Neurology  [facility] [place]  [facility]  [address] [place] [address]  Phone: [phone]  Fax: [phone]

## 2025-03-27 ENCOUNTER — PATIENT MESSAGE (OUTPATIENT)
Dept: NEUROLOGY | Facility: CLINIC | Age: 80
End: 2025-03-27
Payer: MEDICARE

## 2025-03-27 DIAGNOSIS — G20.A1 PARKINSON'S DISEASE, UNSPECIFIED WHETHER DYSKINESIA PRESENT, UNSPECIFIED WHETHER MANIFESTATIONS FLUCTUATE: ICD-10-CM

## 2025-03-27 RX ORDER — CARBIDOPA AND LEVODOPA 50; 200 MG/1; MG/1
TABLET, EXTENDED RELEASE ORAL
Start: 2025-03-27

## 2025-03-31 ENCOUNTER — HOSPITAL ENCOUNTER (OUTPATIENT)
Dept: RADIOLOGY | Facility: CLINIC | Age: 80
Discharge: HOME | End: 2025-03-31
Payer: MEDICARE

## 2025-03-31 ENCOUNTER — OFFICE VISIT (OUTPATIENT)
Dept: ORTHOPEDIC SURGERY | Facility: CLINIC | Age: 80
End: 2025-03-31
Payer: MEDICARE

## 2025-03-31 DIAGNOSIS — M25.512 ACUTE PAIN OF LEFT SHOULDER: ICD-10-CM

## 2025-03-31 DIAGNOSIS — S42.202A CLOSED FRACTURE OF PROXIMAL END OF LEFT HUMERUS, UNSPECIFIED FRACTURE MORPHOLOGY, INITIAL ENCOUNTER: Primary | ICD-10-CM

## 2025-03-31 PROCEDURE — 99213 OFFICE O/P EST LOW 20 MIN: CPT | Performed by: INTERNAL MEDICINE

## 2025-03-31 PROCEDURE — L3670 SO ACRO/CLAV CAN WEB PRE OTS: HCPCS | Performed by: INTERNAL MEDICINE

## 2025-03-31 PROCEDURE — 73030 X-RAY EXAM OF SHOULDER: CPT | Mod: LT

## 2025-03-31 PROCEDURE — 99204 OFFICE O/P NEW MOD 45 MIN: CPT | Performed by: INTERNAL MEDICINE

## 2025-03-31 NOTE — PROGRESS NOTES
Acute Injury New Patient Visit    CC:   Chief Complaint   Patient presents with    Left Shoulder - Pain       HPI: Jennifer Russell is a 79 y.o. female presents today for evaluation for acute left shoulder injury sustained three days ago after she fell and hit a wall at her nursing home. She notes a history of falls. She is currently in a wheel chair but regularly uses a walker for ambulating. X-rays were taken at her nursing home, however those results are not available today. She is here for initial evaluation.  Has a past medical history of Parkinson's.        Review of Systems   GENERAL: Negative for malaise, significant weight loss, fever  MUSCULOSKELETAL: See HPI  NEURO:  Negative for numbness / tingling     Past Medical History  Past Medical History:   Diagnosis Date    Dementia (Multi)     Depression     Hyperlipidemia     Hypertension     Metabolic encephalopathy     Parkinson's disease (Multi)     Recurrent falls        Medication review  Medication Documentation Review Audit       Reviewed by Shai Gerber (Technician) on 09/13/24 at 1004      Medication Order Taking? Sig Documenting Provider Last Dose Status   amantadine (Symmetrel) 100 mg tablet [id number] Yes Take 1 tablet (100 mg) by mouth 2 times a day. Twice a day at 8a and noon   Patient taking differently: Take 1 tablet (100 mg) by mouth once daily. Twice a day at 8a and noon    Sandi Coppola MD 9/12/2024 Active   carbidopa-levodopa (Sinemet CR)  mg ER tablet [id number] Yes Take 1 tablet by mouth once daily at bedtime. Sandi Coppola MD 9/12/2024 Active   carbidopa-levodopa (Sinemet)  mg tablet [id number] Yes Take 2 tablets by mouth 4 times a day. At 8a, 11a, 2p & 5p Sandi Coppola MD 9/12/2024 Active   cephalexin (Keflex) 250 mg capsule [id number] No Take 2 capsules (500 mg) by mouth 3 times a day for 4 days.   Patient not taking: Reported on 9/13/2024    Chet John MD Not Taking Active   cyanocobalamin, vitamin B-12, (Vitamin  B-12) 1,000 mcg tablet extended release [id number] Yes Take 1 tablet (1,000 mcg) by mouth once daily. Historical Provider, MD 9/12/2024 Active   melatonin 3 mg capsule [id number] Yes Take 3 mg by mouth once daily as needed. Abraham Provider, MD 9/12/2024 Active   polyethylene glycol (Glycolax, Miralax) 17 gram packet [id number] Yes Take 17 g by mouth once daily. Historical Provider, MD 9/12/2024 Active   QUEtiapine (SEROquel) 25 mg tablet [id number]  Take 1 tablet (25 mg) by mouth once daily at bedtime.   Patient taking differently: Take 2 tablets (50 mg) by mouth once daily at bedtime.    Chet John MD  Active   venlafaxine XR (Effexor XR) 150 mg 24 hr capsule [id number] Yes Take by mouth once daily. Historical Provider, MD 9/12/2024 Active   venlafaxine XR (Effexor-XR) 75 mg 24 hr capsule [id number] Yes Take 1 capsule (75 mg) by mouth. Historical Provider, MD 9/12/2024 Active                    Allergies  Allergies   Allergen Reactions    Sulfa (Sulfonamide Antibiotics) Rash     RED BUMPS    Sulfamethoxazole-Trimethoprim Unknown and Rash       Social History  Social History     Socioeconomic History    Marital status:      Spouse name: Not on file    Number of children: Not on file    Years of education: Not on file    Highest education level: Not on file   Occupational History    Not on file   Tobacco Use    Smoking status: Former     Types: Cigarettes    Smokeless tobacco: Never   Substance and Sexual Activity    Alcohol use: Never    Drug use: Never    Sexual activity: Not on file   Other Topics Concern    Not on file   Social History Narrative    Not on file     Social Drivers of Health     Financial Resource Strain: Patient Unable To Answer (9/13/2024)    Overall Financial Resource Strain (CARDIA)     Difficulty of Paying Living Expenses: Patient unable to answer   Food Insecurity: No Food Insecurity (8/21/2024)    Received from [facility]    Hunger Vital Sign     Worried About Running Out of Food in  the Last Year: Never true     Ran Out of Food in the Last Year: Never true   Transportation Needs: No Transportation Needs (9/13/2024)    PRAPARE - Transportation     Lack of Transportation (Medical): No     Lack of Transportation (Non-Medical): No   Physical Activity: Inactive (8/21/2024)    Received from [facility]    Exercise Vital Sign     Days of Exercise per Week: 1 day     Minutes of Exercise per Session: 0 min   Stress: Stress Concern Present (8/21/2024)    Received from [facility]    [employer] - Occupational Stress Questionnaire     Feeling of Stress : To some extent   Social Connections: Moderately Integrated (8/21/2024)    Received from [facility]    Social Connection and Isolation Panel [NHANES]     Frequency of Communication with Friends and Family: More than three times a week     Frequency of Social Gatherings with Friends and Family: More than three times a week     Attends Orthodox Services: 1 to 4 times per year     Active Member of Clubs or Organizations: Yes     Attends Club or Organization Meetings: More than 4 times per year     Marital Status:    Intimate Partner Violence: Not At Risk (8/21/2024)    Received from [facility]    Humiliation, Afraid, Rape, and Kick questionnaire     Fear of Current or Ex-Partner: No     Emotionally Abused: No     Physically Abused: No     Sexually Abused: No   Housing Stability: Low Risk  (9/13/2024)    Housing Stability Vital Sign     Unable to Pay for Housing in the Last Year: No     Number of Times Moved in the Last Year: 0     Homeless in the Last Year: No       Surgical History  Past Surgical History:   Procedure Laterality Date    ADENOIDECTOMY      TONSILLECTOMY         Physical Exam:  GENERAL:  Patient is awake, alert, and oriented to person place and time.  Patient appears well nourished and well kept.  Affect Calm, Not Acutely Distressed.  HEENT:  Normocephalic, Atraumatic, EOMI  CARDIOVASCULAR:   Hemodynamically stable.  RESPIRATORY:  Normal respirations with unlabored breathing.  Extremity: Left shoulder shows skin is intact.  Limited range of motion due to pain and guarding.  Pain over the proximal humerus.  Distal pulses are palpable.  She can flex left elbow to 130 degrees, full extension at 0 degrees.  She is neurovascularly intact.  Right shoulder was examined for comparison.    Diagnostics: X-rays reviewed      Procedure: None    Assessment: Acute proximal humerus fracture left shoulder    Plan: Jennifer Russell presents today for evaluation for acute left shoulder injury sustained three days ago after she fell and hit a wall with the left shoulder. We recommended a stat CT of the left shoulder for preoperative planning and sling for comfort. She will follow-up with Dr Sebastian Basurto after the CT scan.  No fracture charge for today's visit, as patient be following up with Dr Basurto for continued fracture care.    Orders Placed This Encounter    XR shoulder left 2+ views      At the conclusion of the visit there were no further questions by the patient/family regarding their plan of care.  Patient was instructed to call or return with any issues, questions, or concerns regarding their injury and/or treatment plan described above.     03/31/25 at 10:18 AM - Luis Antonio Murillo MD  Scribe Attestation  By signing my name below, I, Usama Jacy, Scribseema   attest that this documentation has been prepared under the direction and in the presence of Luis Antonio Murillo MD.    Office: [phone]    This note was prepared using voice recognition software.  The details of this note are correct and have been reviewed, and corrected to the best of my ability.  Some grammatical errors may persist related to the Dragon software.

## 2025-04-02 ENCOUNTER — HOSPITAL ENCOUNTER (OUTPATIENT)
Dept: RADIOLOGY | Facility: CLINIC | Age: 80
Discharge: HOME | End: 2025-04-02
Payer: MEDICARE

## 2025-04-02 DIAGNOSIS — M25.512 ACUTE PAIN OF LEFT SHOULDER: ICD-10-CM

## 2025-04-02 PROCEDURE — 73200 CT UPPER EXTREMITY W/O DYE: CPT | Mod: LT

## 2025-04-07 ENCOUNTER — OFFICE VISIT (OUTPATIENT)
Dept: ORTHOPEDIC SURGERY | Facility: CLINIC | Age: 80
End: 2025-04-07
Payer: MEDICARE

## 2025-04-07 DIAGNOSIS — S42.202A CLOSED FRACTURE OF PROXIMAL END OF LEFT HUMERUS, UNSPECIFIED FRACTURE MORPHOLOGY, INITIAL ENCOUNTER: Primary | ICD-10-CM

## 2025-04-07 PROCEDURE — 23600 CLTX PROX HUMRL FX W/O MNPJ: CPT | Performed by: STUDENT IN AN ORGANIZED HEALTH CARE EDUCATION/TRAINING PROGRAM

## 2025-04-07 PROCEDURE — 99214 OFFICE O/P EST MOD 30 MIN: CPT | Performed by: STUDENT IN AN ORGANIZED HEALTH CARE EDUCATION/TRAINING PROGRAM

## 2025-04-07 PROCEDURE — 99215 OFFICE O/P EST HI 40 MIN: CPT | Mod: 57 | Performed by: STUDENT IN AN ORGANIZED HEALTH CARE EDUCATION/TRAINING PROGRAM

## 2025-04-07 NOTE — PROGRESS NOTES
Chief Complaint   Patient presents with    Left Shoulder - New Patient Visit, Results     CT results, acute proximal humerus fx. 3/28/2025     History of Present Illness   Jennifer Russell is a 79-year-old female with past medical history of Parkinson's disease presenting today as a new patient presents today for evaluation of her left shoulder.  The patient sustained an acute injury 3/28/2025 when she fell and hit a wall at her nursing home.   The patient denies any loss of consciousness or additional significant injuries.  The patient denies any current numbness or tingling.  The pain is sharp, acute in nature, better with rest worse with motion.     Overall patient feeling better today all things considered.  Pain is minimal at rest.  Does endorse significant swelling and bruising about the left upper extremity.  She has been compliant with wearing her sling and adhering to strict nonweightbearing status.     Review of Systems   GENERAL: Negative  GI: Negative  MUSCULOSKELETAL: See HPI  SKIN: Negative  NEURO:  Negative     Physical Exam:  Left upper extremity  Skin healthy to gross inspection, no breakdown  Swelling / ecchymosis noted  Intact flexion and extension of 1st IP joint and finger abduction  Sensation intact to light touch medial / ulnar and radial nerve distribution   Good cap refill     Imaging   I personally reviewed left shoulder x-rays dated 3/31/2025 and agree with the following findings:  Narrative & Impression   Interpreted By:  Luis Antonio Bishop,   STUDY:  XR SHOULDER LEFT 2+ VIEWS, 3/31/2025 9:39 am      INDICATION:  Signs/Symptoms:pain      ACCESSION NUMBER(S):  [id number]      ORDERING CLINICIAN:  LUIS ANTONIO BISHOP      FINDINGS:  Left shoulder x-rays four views: Acute impacted and comminuted  proximal humerus fracture involving the surgical neck with  involvement of the greater tuberosity, glenohumeral joint is  maintained.          Signed by: Luis Antonio Bishop 3/31/2025 6:32 PM  Dictation workstation:    [id number]     Narrative & Impression   Interpreted By:  Lora Metcalf,   STUDY:  CT SHOULDER LEFT WO IV CONTRAST; ;  4/2/2025 2:09 pm      INDICATION:  Signs/Symptoms:pain.      ,M25.512 Pain in left shoulder      COMPARISON:  None.      ACCESSION NUMBER(S):  [id number]      ORDERING CLINICIAN:  ROBERT BISHOP      TECHNIQUE:  Serial axial CT images obtained of the left shoulder. Images  reformatted in the coronal and sagittal projection      FINDINGS:  Left glenohumeral articulation demonstrates transverse fracture  through the surgical neck of the humerus with resultant impaction and  posterior rotary subluxation of the humeral head at the level of the  fracture line. There is impaction and cortical step-off along the  posterior margin of the fracture identified measuring 1.8 cm. Subtle  component of fracture line demonstrated extending into the greater  tuberosity of the humerus without displaced fracture fragments noted.  There is no displaced fracture fragments through the lesser  tuberosity. A few fracture fragments are noted. Bony callus formation  at the level of the fracture line. Correlate with time course since  patient's injury. Previously described findings are in keeping with  Neer 2 part fracture.      There is moderate glenohumeral joint fluid.      Osseous glenoid demonstrates mild osteophytosis about the glenoid  rim. Remainder of the visualized scapula is unremarkable. Acromion  process unremarkable. Mild acromioclavicular joint osteoarthritis.  Included portions visualized left clavicle is unremarkable.  Generalized subcutaneous edema about the left arm. There is more  focal edema tracking in the inter fascial planes about the humeral  shafts. More focal fluid density is demonstrated in the inter fascial  plane superficial to the pectoralis and at the margin of the deltoid  muscle anteriorly.      Included portions visualized left ribs are unremarkable. Visualized  portions of the lung  parenchyma                      IMPRESSION:  1. Neer 2 part fracture demonstrated of the surgical neck of the left  humerus with posterior rotary subluxation of the humeral head. There  is suggestion of component of callus formation at the fracture line.  Correlate with time course since patient's injury. A glenohumeral  joint effusion is demonstrated.          MACRO:  None      Signed by: Lora Metcalf 4/2/2025 3:27 PM  Dictation workstation:   [id number]        Assessment   Patient with an acute comminuted and impacted proximal humerus fracture, 1 week from injury     Plan:  CT findings discussed with patient today  Discussed with the patient that this fracture is amenable to non-surgical management.  Discussed a period of immobilization and serial radiographs followed by rehab and return to activities.  Regarding immobilization recommend continuation of sling at all times  Regarding weightbearing discussed strict nonweightbearing status.     Follow-up: 4 weeks  X-rays at follow-up: 3 view left shoulder Grashey axillary Scap Y       Pipe Ni PA-C    In a face to face encounter, I evaluated the patient and performed a physical examination, discussed pertinent diagnostic studies if indicated and discussed diagnosis and management strategies with both the patient and physician assistant / nurse practitioner.  I reviewed the PA/NP's note and agree with the documented findings and plan of care.       Sebastian Basurto III, MD    79-year-old female sustained a fall onto her nondominant shoulder resulting in a left comminuted proximal humerus fracture.  Presents today for discussion of x-rays and CT results.  She has pain on palpation about the shoulder limited mobility ecchymosis about the arm forearm hand and wrist.  Neurovascular intact.  Currently resides at a facility.  X-rays and CTs reviewed.  There is a moderately displaced impacted comminuted four-part proximal humerus fracture my independent interpretation.   Assessment and plan 79-year-old female with displaced comminuted proximal humerus fracture.  Discussed that she sustained a severe injury to her shoulder and that we will have severe limitations for the rest of her life.       Based on history and physical exam as well as x-ray image findings, we will proceed with nonoperative treatment.  Overall alignment of fracture is reasonable for healing and return of function to the affected extremity.  Recommend continued use of aggressive ice and elevation 20 minutes on and off for pain control as well as continued use of over-the-counter anti-inflammatories for pain.  Regarding immobilization recommend sling.      We discussed the importance of vitamins particularly the use of vitamin D to maximize nutritional environment for healing of fracture.  Patient agrees to optimize nutrition and diet to provide favorable environment for healing.    I discussed nonoperative treatment of the fracture. I specifically reviewed the risks associated with the immobilization including skin breakdown or irritation. I discussed the need for repeat radiographs to assess for displacement. Additionally I discussed the risks of malunion, nonunion, and possible need for conversion to open surgical treatment. I discussed the importance of limited weightbearing if indicated and the importance of compliance with follow-up radiographs and weightbearing restrictions to obtain the optimum result.     Additionally I discussed there may be residual stiffness after discontinuation of any immobilization that might require physical therapy. Ultimately there could be some long-term restriction of range of motion. I discussed the use of narcotics for short-term pain management, and the fact that these will not be used in the long term. Patient expressed understanding and agrees the plan.    Follow-up in 6 weeks or repeat x-ray 3 views left shoulder

## 2025-05-07 ENCOUNTER — APPOINTMENT (OUTPATIENT)
Dept: ORTHOPEDIC SURGERY | Facility: CLINIC | Age: 80
End: 2025-05-07
Payer: MEDICARE

## 2025-05-07 ENCOUNTER — HOSPITAL ENCOUNTER (OUTPATIENT)
Dept: RADIOLOGY | Facility: CLINIC | Age: 80
Discharge: HOME | End: 2025-05-07
Payer: MEDICARE

## 2025-05-07 DIAGNOSIS — S42.202A CLOSED FRACTURE OF PROXIMAL END OF LEFT HUMERUS, UNSPECIFIED FRACTURE MORPHOLOGY, INITIAL ENCOUNTER: Primary | ICD-10-CM

## 2025-05-07 DIAGNOSIS — S42.202A CLOSED FRACTURE OF PROXIMAL END OF LEFT HUMERUS, UNSPECIFIED FRACTURE MORPHOLOGY, INITIAL ENCOUNTER: ICD-10-CM

## 2025-05-07 PROCEDURE — 99212 OFFICE O/P EST SF 10 MIN: CPT | Performed by: STUDENT IN AN ORGANIZED HEALTH CARE EDUCATION/TRAINING PROGRAM

## 2025-05-07 PROCEDURE — 73030 X-RAY EXAM OF SHOULDER: CPT | Mod: LT

## 2025-05-07 PROCEDURE — 99024 POSTOP FOLLOW-UP VISIT: CPT | Performed by: STUDENT IN AN ORGANIZED HEALTH CARE EDUCATION/TRAINING PROGRAM

## 2025-05-07 NOTE — PROGRESS NOTES
Chief Complaint   Patient presents with    Left Shoulder - New Patient Visit, Results     Acute proximal humerus fx. 3/28/2025, xrays today       History of Present Illness   Patient presents today for 6 week follow up left proximal humerus fracture.    The patient denies any current numbness or tingling.    Pain is improving.  Patient patient with history of Parkinson's known history of complex four-part proximal humerus fracture displaced.    At the initial visit recommend conservative treatment given patient's past medical history and ability to comply with restrictions.        Review of Systems   GENERAL: Negative  GI: Negative  MUSCULOSKELETAL: See HPI  SKIN: Negative  NEURO:  Negative     Physical Exam:  side: left upper extremity: Mild TTP proximal humerus  ROM limited in comparison to uninjured side. Active forward elevation 0-50  Skin healthy to gross inspection, no breakdown  Swelling / ecchymosis noted  Intact flexion and extension of 1st IP joint and finger abduction  Sensation intact to light touch medial / ulnar and radial nerve distribution   Good cap refill     Imaging  Imaging  XR shoulder left 2+ views  Result Date: 5/7/2025  Displaced comminuted left proximal humerus fracture     MACRO: None   Signed by: Sebastian Basurto III 5/7/2025 2:37 PM Dictation workstation:   [id number]      Cardiology, Vascular, and Other Imaging  No other imaging results found for the past 7 days      Assessment   Patient with a healing side: left proximal humerus  fracture      Plan:  Discontinue immobilization 3  Begin working on gentle passive range of motion, Discussed the importance of using pain as a guide  Reviewed rehab /return to activities  Follow up 2 months  X-rays at follow-up 3views affected shoulder Grashey, Scap Y, axillary      Discussed overall alignment findings with Jennifer Russell as well as her son today discussed that she does have significant displacement even worsening since last visit I do see  abundant new bridging bone formation and callus formation consistent with healing.  She does have a history of Parkinson's and has a hard time sitting still discussed that she would be at a higher risk for surgical complication namely instability.  Given this and her pain is improving we will continue with expectant management discussed that she would likely have residual limitations with the shoulder for the rest of her life she is okay with this.

## 2025-06-27 RX ORDER — AMOXICILLIN 500 MG/1
500 CAPSULE ORAL
COMMUNITY
Start: 2025-04-09

## 2025-06-27 RX ORDER — ACETAMINOPHEN AND CODEINE PHOSPHATE 300; 30 MG/1; MG/1
TABLET ORAL
COMMUNITY
Start: 2025-04-09

## 2025-06-27 RX ORDER — TRAMADOL HYDROCHLORIDE 50 MG/1
50 TABLET, FILM COATED ORAL
COMMUNITY
Start: 2025-04-26

## 2025-06-30 ENCOUNTER — OFFICE VISIT (OUTPATIENT)
Dept: NEUROLOGY | Facility: CLINIC | Age: 80
End: 2025-06-30
Payer: MEDICARE

## 2025-06-30 VITALS — BODY MASS INDEX: 26.66 KG/M2 | WEIGHT: 160 LBS | HEIGHT: 65 IN

## 2025-06-30 DIAGNOSIS — R41.89 COGNITIVE DECLINE: ICD-10-CM

## 2025-06-30 DIAGNOSIS — G20.A1 PARKINSON'S DISEASE, UNSPECIFIED WHETHER DYSKINESIA PRESENT, UNSPECIFIED WHETHER MANIFESTATIONS FLUCTUATE: Primary | ICD-10-CM

## 2025-06-30 PROCEDURE — G2211 COMPLEX E/M VISIT ADD ON: HCPCS | Performed by: PSYCHIATRY & NEUROLOGY

## 2025-06-30 PROCEDURE — 99213 OFFICE O/P EST LOW 20 MIN: CPT | Performed by: PSYCHIATRY & NEUROLOGY

## 2025-06-30 PROCEDURE — 1159F MED LIST DOCD IN RCRD: CPT | Performed by: PSYCHIATRY & NEUROLOGY

## 2025-06-30 ASSESSMENT — ENCOUNTER SYMPTOMS
OCCASIONAL FEELINGS OF UNSTEADINESS: 0
LOSS OF SENSATION IN FEET: 0
DEPRESSION: 0

## 2025-06-30 ASSESSMENT — UNIFIED PARKINSONS DISEASE RATING SCALE (UPDRS)
FINGER_TAPPING_RIGHT: 1
AMPLITUDE_RLE: 0
PRONATION_SUPINATION_LEFT: 2
LEVODOPA: YES
PRONATION_SUPINATION_RIGHT: 0
AMPLITUDE_LIP_JAW: 0
PARKINSONS_MEDS: YES
RIGIDITY_RLE: 2
CLINICAL_STATE: ON
RIGIDITY_RUE: 0
FACIAL_EXPRESSION: 1
AMPLITUDE_RUE: 0
SPONTANEITY_OF_MOVEMENT: 1
TOETAPPING_LEFT: 2
HANDMOVEMENTS_RIGHT: 1
KINETIC_TREMOR_LEFTHAND: 0
LEG_AGILITY_RIGHT: 1
POSTURE: 1
AMPLITUDE_LUE: 0
TOTAL_SCORE: 33
POSTURAL_TREMOR_LEFTHAND: 0
LEG_AGILITY_LEFT: 1
POSTURAL_TREMOR_RIGHTHAND: 0
MINUTES_SINCE_LEVODOPA: 60 MINS
RIGIDITY_NECK: 0
GAIT: 4
RIGIDITY_LLE: 0
MOVEMENTS_INTERFERE_WITH_RATINGS: NO
CHAIR_RISING_SCALE: 3
KINETIC_TREMOR_RIGHTHAND: 0
SPEECH: 2
TOETAPPING_RIGHT: 2
POSTURAL_STABILITY: 4
AMPLITUDE_LLE: 0
CONSTANCY_TREMOR_ATREST: 0
FINGER_TAPPING_LEFT: 2
FREEZING_GAIT: 0
DYSKINESIAS_PRESENT: YES
RIGIDITY_LUE: 1

## 2025-06-30 ASSESSMENT — ANXIETY QUESTIONNAIRES
5. BEING SO RESTLESS THAT IT IS HARD TO SIT STILL: NOT AT ALL
1. FEELING NERVOUS, ANXIOUS, OR ON EDGE: NOT AT ALL
7. FEELING AFRAID AS IF SOMETHING AWFUL MIGHT HAPPEN: NOT AT ALL
4. TROUBLE RELAXING: NOT AT ALL
2. NOT BEING ABLE TO STOP OR CONTROL WORRYING: NOT AT ALL
3. WORRYING TOO MUCH ABOUT DIFFERENT THINGS: NOT AT ALL
GAD7 TOTAL SCORE: 0
IF YOU CHECKED OFF ANY PROBLEMS ON THIS QUESTIONNAIRE, HOW DIFFICULT HAVE THESE PROBLEMS MADE IT FOR YOU TO DO YOUR WORK, TAKE CARE OF THINGS AT HOME, OR GET ALONG WITH OTHER PEOPLE: NOT DIFFICULT AT ALL
6. BECOMING EASILY ANNOYED OR IRRITABLE: NOT AT ALL

## 2025-06-30 ASSESSMENT — PATIENT HEALTH QUESTIONNAIRE - PHQ9
1. LITTLE INTEREST OR PLEASURE IN DOING THINGS: NOT AT ALL
SUM OF ALL RESPONSES TO PHQ9 QUESTIONS 1 AND 2: 0
2. FEELING DOWN, DEPRESSED OR HOPELESS: NOT AT ALL

## 2025-06-30 NOTE — PATIENT INSTRUCTIONS
It was very nice to see you today,           #Continue meds     Med Dose Time   Carbidopa-levodopa 25/100mg 2 tabs 4 times a day [id number]   Carbidopa-levodopa 50/200mg 1 tab at bedtime Bedtime/ 8pm       # continue quetiapine 25 mg at bedtime.     # can consider adding a cholinesterase inhibitor in future.     #Consider Crexont in the future if dyskinesias become bothersome (wiggly movements)      # RTC in 6 months,

## 2025-06-30 NOTE — PROGRESS NOTES
Subjective     Jennifer Murrell is a 79 y.o. year old female who presents with Parkinson's Disease, here for follow up visit.    HPI    She is assisted living.   She likes it. She has made new friends, she feels things are going well. She had to learn how to walk again almost, she fell into a wall and broke her shoulder, she is no longer allowed to walk alone, only w assistance. She has a wheelchair now, she hates it     MOTOR SYMPTOMS      +/-                            Comments  Motor sx overall     Tremor + Comes and goes. Depends on what she is doing. Can happen at any time.     Rigidity + Not bad   Bradykinesia +    Balance/gait + Walker and now wheelchair. She denies shuffling   FOG - Mild shuffle as per son   Falls + See above, when fractured her shoulder. May walk into things.    PT +    Exercise + More active at the AL, exercising     NON MOTOR SYMPTOMS       +/-                               Comments  Orthostatic lightheadedness  + Very occasionally if sits up to fast,    Cognitive + Loses train of thoughts sometimes, does not feel debilitating, knows most things per son. She reads and remembers, sometimes also gets dates etc wrong.    Insomnia -    RBD -    Depression -    Anxiety -     Fatigue  - Better now   Hypophonia -    Dysphagia -    Constipation + On regimen, does well on same.    Urinary dysfunction + UTI sx cleared with drinking more water        Social  Lives with: alone--plans for AL  Help with ADLs: complete         [facility] Meds  Med Dose Time   Carbidopa-levodopa 25/100mg 2 tabs 4 times a day [id number]   Carbidopa-levodopa 50/200mg 1 tab at bedtime Bedtime/ 8pm   Seroquel 25mg  1 tab daily     Latency unaware    Wearing off Denies    Side effects     Dyskinesia Comes and goes, gets on her nerves    Hallucinations None currently, only once in setting of UTI.     Other None      Prior medications:  Amantadine 100mg 2 times a day (stopped when had  "UTI/hallucinations)    Pertinent testin24: QTc 440    24 MRI brain:  IMPRESSION  * There is no evidence of mass, acute cerebral infarction or acute hemorrhage.    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 Language and learning/memory were relatively spared, demonstrating adequate consolidation and retrieval (with benefit from cues) on information .\"  ...\"Taken together, the current cognitive profile, including deficits in attention, processing speed, and executive function, combined with reported declines in ability to perform IADLs (not due to physical difficulties) are consistent with a dementia level of decline (major neurocognitive disorder F03.90). Etiologically, her history (with onset of cognitive difficulties following gait/tremor disturbance and visual disturbances) and current profile are most consistent with Parkinson's disease. While some contribution from her cerebrovascular risk factors (e.g., hypertension, hyperlipidemia), \"...    MoCA 2023 was 23/30     Patient Health Questionnaire-2 Score: 0            Current Outpatient Medications:     acetaminophen-codeine (Tylenol w/ Codeine #3) 300-30 mg tablet, , Disp: , Rfl:     amoxicillin (Amoxil) 500 mg capsule, 1 capsule (500 mg)., Disp: , Rfl:     carbidopa-levodopa (Sinemet CR)  mg ER tablet, 1 tab nightly at 8pm., Disp: , Rfl:     carbidopa-levodopa (Sinemet)  mg tablet, Take 2 tablets by mouth 4 times a day. " "At 8a, 11a, 2p & 5p, Disp: , Rfl:     cyanocobalamin, vitamin B-12, (Vitamin B-12) 1,000 mcg tablet extended release, Take 1 tablet (1,000 mcg) by mouth once daily., Disp: , Rfl:     melatonin 3 mg tablet, Take 1 tablet (3 mg) by mouth once daily at bedtime., Disp: , Rfl:     metoprolol succinate XL (Toprol-XL) 25 mg 24 hr tablet, Take 1 tablet (25 mg) by mouth once daily. Do not crush or chew. Hold for SBP < 100 and/or HR <60., Disp: , Rfl:     polyethylene glycol (Glycolax, Miralax) 17 gram packet, Take 17 g by mouth once daily., Disp: , Rfl:     QUEtiapine (SEROquel) 25 mg tablet, Take 1 tablet (25 mg) by mouth once daily at bedtime., Disp: , Rfl:     QUEtiapine (SEROquel) 25 mg tablet, Take 1 tablet (25 mg) by mouth once daily as needed (agitation)., Disp: , Rfl:     traMADol (Ultram) 50 mg tablet, Take 1 tablet (50 mg) by mouth., Disp: , Rfl:     venlafaxine XR (Effexor XR) 150 mg 24 hr capsule, Take 1 capsule (150 mg) by mouth once daily., Disp: , Rfl:     venlafaxine XR (Effexor-XR) 75 mg 24 hr capsule, Take 1 capsule (75 mg) by mouth once daily., Disp: , Rfl:     apixaban (Eliquis) 5 mg tablet, Take 1 tablet (5 mg) by mouth every 12 hours., Disp: 60 tablet, Rfl: 0       Objective   Vitals:    06/30/25 1217   Weight: 72.6 kg (160 lb)   Height: 1.651 m (5' 5\")                   Physical Exam    Mild/moderate dyskinesia, even more so at initiation of visit.       MDS UPDRS 1st Score: Motor Examination  Is the patient on medication for treating the symptoms of Parkinson's Disease?: Yes  Patients receiving medication for treating the symptoms of Parkinson's Disease, ben the patient's clinical state.: On  Is the patient on Levodopa?: Yes  Minutes since last Levodopa dose: 60 mins  Speech: 2  Facial Expression: 1  Rigidty Neck: 0  Rigidty RUE: 0  Rigidity - LUE: 1  Rigidity RLE: 2  Rigidity LLE: 0  Finger Tapping Right Hand: 1  Finger Tapping Left Hand: 2  Hand Movements- Right Hand: 1  Hand Movements- Left Hand: " 2  Pronatiaon-Supination Movments - Right Hand: 0  Pronatiaon-Supination Movments Left Hand: 2  Toe Tapping Right Foot: 2  Toe Tapping - Left Foot: 2  Leg Agility - Right Le  Leg Agility - Left le  Arising from Chair: 3  Gait: 4  Freezing of Gait: 0  Postural Stability: 4  Posture: 1  Global Spontanteity of Movment ( Body Bradykinesia): 1  Postural Tremor - Right Hand: 0  Postural Tremor - Left hand: 0  Kinetic Tremor - Right hand: 0  Kinetic Tremor - Left hand: 0  Rest Tremor Amplitude - RUE: 0  Rest Tremor Amplitude - LUE: 0  Rest Tremor Amplitude - RLE: 0  Rest Tremor Amplitude - LLE: 0  Rest Tremor Amplitude - Lip/Jaw: 0  Constancy of Rest Tremor: 0  MDS UPDRS Total Score: 33  Were dyskinesias (chorea or dystonia) present during examination?: Yes  Did these movements interfere with your rating?: No        Assessment/Plan   Ms. Jennifer Murrell is a 79 y.o. F with tremor predominant PD, diagnosed around  and within this past year a diagnosis of dementia (NP testing 2024). She presents for f/u after last visit where she had worsening osychosis after hospitalization for UTI then a few days after last visit another hospitalization 24 for UTI. She is on low dose Seroquel. Went to rehab, now in an AL and doing well, no further hallucinations, motor sx stable.    Son Alek is medical POA  Son Dallas is finaincial POA     There are no diagnoses linked to this encounter.      #Consider Crexont in the future if dyskinesias become bothersome (wiggly movements)    #Continue meds     Med Dose Time   Carbidopa-levodopa 25/100mg 2 tabs 4 times a day [id number]-[id number]-5pm   Carbidopa-levodopa 50/200mg 1 tab at bedtime Bedtime/ 8pm     # continue Quetiapine 25 mg at bedtime.     # can consider adding a cholinesterase inhibitor in future.     # RTC in 6 months.       This is a chronic neurologic condition that requires ongoing care and monitoring. This is a complex, serious condition that needs long term care going  forward. Between myself and the patient we will be changing direction of care depending on responses to treatment.   Today we discussed medication options, non medication options for management and various other symptoms that are in relation to this disease.  I will continue to be involved in the care of this patient.

## 2025-07-07 ENCOUNTER — APPOINTMENT (OUTPATIENT)
Dept: ORTHOPEDIC SURGERY | Facility: CLINIC | Age: 80
End: 2025-07-07
Payer: MEDICARE

## 2025-07-21 ENCOUNTER — APPOINTMENT (OUTPATIENT)
Dept: ORTHOPEDIC SURGERY | Facility: CLINIC | Age: 80
End: 2025-07-21
Payer: MEDICARE

## 2025-07-21 ENCOUNTER — HOSPITAL ENCOUNTER (OUTPATIENT)
Dept: RADIOLOGY | Facility: CLINIC | Age: 80
Discharge: HOME | End: 2025-07-21
Payer: MEDICARE

## 2025-07-21 DIAGNOSIS — S42.202A CLOSED FRACTURE OF PROXIMAL END OF LEFT HUMERUS, UNSPECIFIED FRACTURE MORPHOLOGY, INITIAL ENCOUNTER: ICD-10-CM

## 2025-07-21 PROCEDURE — 73030 X-RAY EXAM OF SHOULDER: CPT | Mod: LEFT SIDE | Performed by: STUDENT IN AN ORGANIZED HEALTH CARE EDUCATION/TRAINING PROGRAM

## 2025-07-21 PROCEDURE — 99212 OFFICE O/P EST SF 10 MIN: CPT | Performed by: STUDENT IN AN ORGANIZED HEALTH CARE EDUCATION/TRAINING PROGRAM

## 2025-07-21 PROCEDURE — 73030 X-RAY EXAM OF SHOULDER: CPT | Mod: LT

## 2025-07-21 PROCEDURE — 99213 OFFICE O/P EST LOW 20 MIN: CPT | Performed by: STUDENT IN AN ORGANIZED HEALTH CARE EDUCATION/TRAINING PROGRAM

## 2025-07-21 NOTE — PROGRESS NOTES
Chief Complaint   Patient presents with    Left Shoulder - New Patient Visit, Results     Acute proximal humerus fx. 3/28/2025, xrays today       History of Present Illness   Patient presents today for 4 month follow up left proximal humerus fx.    The patient denies any current numbness or tingling.  Pain much improved.     Overall doing well.  Patient with known history of left shoulder proximal humerus fracture multiple comorbidities, Parkinson's, dementia        Review of Systems   GENERAL: Negative  GI: Negative  MUSCULOSKELETAL: See HPI  SKIN: Negative  NEURO:  Negative     Physical Exam:  side: left upper extremity: Minimal if any TTP proximal humerus.  Active forward elevation 0 to   ROM slightly limited in comparison to uninjured side  Skin healthy to gross inspection, no breakdown  Swelling / ecchymosis noted  Intact flexion and extension of 1st IP joint and finger abduction  Sensation intact to light touch medial / ulnar and radial nerve distribution   Good cap refill     Imaging  Imaging  XR shoulder left 2+ views  Result Date: 7/21/2025  Malunited left proximal humerus fracture     MACRO: None   Signed by: Sebastian Basurto III 7/21/2025 8:51 AM Dictation workstation:   [id number]      Cardiology, Vascular, and Other Imaging  No other imaging results found for the past 7 days      Assessment   Patient with a healed side: left malunited proximal humerus fracture      Plan:  Patient progressing appropriately  No weightbearing restrictions at this point time  Activities as tolerated weight-bear as tolerated using pain as a guide  Discussed that full recovery can take up to a year's time  If patient feels like they continue to have issues or plateauing regarding recovery patient will return to clinic for repeat evaluation    Follow-up 3 months, x-rays at follow-up 3 views left shoulder    Given patient's age activity level past medical history not currently a surgical candidate.  Overall doing  exceptionally well given current alignment